# Patient Record
Sex: MALE | Race: WHITE | NOT HISPANIC OR LATINO | Employment: FULL TIME | URBAN - NONMETROPOLITAN AREA
[De-identification: names, ages, dates, MRNs, and addresses within clinical notes are randomized per-mention and may not be internally consistent; named-entity substitution may affect disease eponyms.]

---

## 2017-02-08 ENCOUNTER — TRANSFERRED RECORDS (OUTPATIENT)
Dept: HEALTH INFORMATION MANAGEMENT | Facility: HOSPITAL | Age: 52
End: 2017-02-08

## 2017-02-08 DIAGNOSIS — Z72.0 TOBACCO ABUSE: ICD-10-CM

## 2017-02-08 DIAGNOSIS — I10 ESSENTIAL HYPERTENSION: Primary | ICD-10-CM

## 2017-02-23 ENCOUNTER — HOSPITAL ENCOUNTER (OUTPATIENT)
Dept: RESPIRATORY THERAPY | Facility: HOSPITAL | Age: 52
Discharge: HOME | End: 2017-02-23
Attending: PHYSICIAN ASSISTANT | Admitting: FAMILY MEDICINE
Payer: COMMERCIAL

## 2017-02-23 LAB
BASE DEFICIT BLDA-SCNC: 1 MMOL/L
COHGB MFR BLD: 3.5 % (ref 0–2)
HCO3 BLD-SCNC: 23 MMOL/L (ref 21–28)
HGB BLD-MCNC: 15.4 G/DL (ref 13.3–17.7)
O2/TOTAL GAS SETTING VFR VENT: ABNORMAL %
OXYHGB MFR BLD: 92 % (ref 92–100)
PCO2 BLD: 38 MM HG (ref 35–45)
PH BLD: 7.4 PH (ref 7.35–7.45)
PO2 BLD: 66 MM HG (ref 80–105)

## 2017-02-23 PROCEDURE — 94726 PLETHYSMOGRAPHY LUNG VOLUMES: CPT | Mod: 26 | Performed by: INTERNAL MEDICINE

## 2017-02-23 PROCEDURE — 82805 BLOOD GASES W/O2 SATURATION: CPT | Performed by: FAMILY MEDICINE

## 2017-02-23 PROCEDURE — 94729 DIFFUSING CAPACITY: CPT | Mod: 26 | Performed by: INTERNAL MEDICINE

## 2017-02-23 PROCEDURE — 94729 DIFFUSING CAPACITY: CPT

## 2017-02-23 PROCEDURE — 25000308 HC RX OP HPI UCR WEL MED 250 IP 250: Performed by: FAMILY MEDICINE

## 2017-02-23 PROCEDURE — 94726 PLETHYSMOGRAPHY LUNG VOLUMES: CPT

## 2017-02-23 PROCEDURE — 36600 WITHDRAWAL OF ARTERIAL BLOOD: CPT

## 2017-02-23 PROCEDURE — 94010 BREATHING CAPACITY TEST: CPT | Mod: 26 | Performed by: INTERNAL MEDICINE

## 2017-02-23 PROCEDURE — 82375 ASSAY CARBOXYHB QUANT: CPT | Performed by: FAMILY MEDICINE

## 2017-02-23 PROCEDURE — 94010 BREATHING CAPACITY TEST: CPT

## 2017-02-23 PROCEDURE — 85018 HEMOGLOBIN: CPT | Performed by: FAMILY MEDICINE

## 2017-02-23 RX ORDER — ALBUTEROL SULFATE 0.83 MG/ML
2.5 SOLUTION RESPIRATORY (INHALATION)
Status: COMPLETED | OUTPATIENT
Start: 2017-02-23 | End: 2017-02-23

## 2017-02-23 RX ORDER — ALLOPURINOL 300 MG/1
300 TABLET ORAL DAILY
COMMUNITY
End: 2023-07-19

## 2017-02-23 RX ADMIN — ALBUTEROL SULFATE 2.5 MG: 2.5 SOLUTION RESPIRATORY (INHALATION) at 10:28

## 2020-02-14 ENCOUNTER — APPOINTMENT (OUTPATIENT)
Dept: GENERAL RADIOLOGY | Facility: HOSPITAL | Age: 55
End: 2020-02-14
Attending: EMERGENCY MEDICINE
Payer: COMMERCIAL

## 2020-02-14 ENCOUNTER — HOSPITAL ENCOUNTER (EMERGENCY)
Facility: HOSPITAL | Age: 55
Discharge: HOME | End: 2020-02-14
Attending: EMERGENCY MEDICINE | Admitting: EMERGENCY MEDICINE
Payer: COMMERCIAL

## 2020-02-14 ENCOUNTER — HOSPITAL ENCOUNTER (INPATIENT)
Facility: HOSPITAL | Age: 55
LOS: 12 days | Discharge: NURSING, REHAB OR LONG-TERM CARE | DRG: 207 | End: 2020-02-27
Attending: EMERGENCY MEDICINE | Admitting: INTERNAL MEDICINE
Payer: COMMERCIAL

## 2020-02-14 ENCOUNTER — APPOINTMENT (OUTPATIENT)
Dept: GENERAL RADIOLOGY | Facility: HOSPITAL | Age: 55
DRG: 207 | End: 2020-02-14
Attending: EMERGENCY MEDICINE
Payer: COMMERCIAL

## 2020-02-14 ENCOUNTER — APPOINTMENT (OUTPATIENT)
Dept: CT IMAGING | Facility: HOSPITAL | Age: 55
DRG: 207 | End: 2020-02-14
Attending: EMERGENCY MEDICINE
Payer: COMMERCIAL

## 2020-02-14 VITALS
HEART RATE: 65 BPM | TEMPERATURE: 98.1 F | SYSTOLIC BLOOD PRESSURE: 156 MMHG | OXYGEN SATURATION: 94 % | RESPIRATION RATE: 18 BRPM | DIASTOLIC BLOOD PRESSURE: 114 MMHG

## 2020-02-14 DIAGNOSIS — J96.01 ACUTE RESPIRATORY FAILURE WITH HYPOXIA (H): Primary | ICD-10-CM

## 2020-02-14 DIAGNOSIS — I26.99 ACUTE PULMONARY EMBOLISM WITHOUT ACUTE COR PULMONALE, UNSPECIFIED PULMONARY EMBOLISM TYPE (H): ICD-10-CM

## 2020-02-14 DIAGNOSIS — J18.9 PNEUMONIA OF LEFT LOWER LOBE DUE TO INFECTIOUS ORGANISM: ICD-10-CM

## 2020-02-14 DIAGNOSIS — I10 BENIGN ESSENTIAL HYPERTENSION: ICD-10-CM

## 2020-02-14 DIAGNOSIS — J10.1 INFLUENZA A: ICD-10-CM

## 2020-02-14 DIAGNOSIS — J44.1 CHRONIC OBSTRUCTIVE PULMONARY DISEASE WITH ACUTE EXACERBATION (H): ICD-10-CM

## 2020-02-14 LAB
ALBUMIN SERPL-MCNC: 4.4 G/DL (ref 3.4–5)
ALP SERPL-CCNC: 94 U/L (ref 40–150)
ALT SERPL W P-5'-P-CCNC: 28 U/L (ref 0–70)
ANION GAP SERPL CALCULATED.3IONS-SCNC: 6 MMOL/L (ref 3–14)
ANION GAP SERPL CALCULATED.3IONS-SCNC: 8 MMOL/L (ref 3–14)
AST SERPL W P-5'-P-CCNC: 22 U/L (ref 0–45)
BASE DEFICIT BLDA-SCNC: 0.6 MMOL/L
BASE DEFICIT BLDA-SCNC: 4.5 MMOL/L
BASOPHILS # BLD AUTO: 0 10E9/L (ref 0–0.2)
BASOPHILS # BLD AUTO: 0.1 10E9/L (ref 0–0.2)
BASOPHILS NFR BLD AUTO: 0.2 %
BASOPHILS NFR BLD AUTO: 0.6 %
BILIRUB SERPL-MCNC: 0.4 MG/DL (ref 0.2–1.3)
BUN SERPL-MCNC: 17 MG/DL (ref 7–30)
BUN SERPL-MCNC: 27 MG/DL (ref 7–30)
CALCIUM SERPL-MCNC: 8.7 MG/DL (ref 8.5–10.1)
CALCIUM SERPL-MCNC: 9.2 MG/DL (ref 8.5–10.1)
CHLORIDE SERPL-SCNC: 104 MMOL/L (ref 94–109)
CHLORIDE SERPL-SCNC: 108 MMOL/L (ref 94–109)
CO2 SERPL-SCNC: 24 MMOL/L (ref 20–32)
CO2 SERPL-SCNC: 25 MMOL/L (ref 20–32)
CREAT SERPL-MCNC: 0.87 MG/DL (ref 0.66–1.25)
CREAT SERPL-MCNC: 0.89 MG/DL (ref 0.66–1.25)
D DIMER PPP FEU-MCNC: 1.3 UG/ML FEU (ref 0–0.5)
DIFFERENTIAL METHOD BLD: ABNORMAL
DIFFERENTIAL METHOD BLD: ABNORMAL
EOSINOPHIL # BLD AUTO: 0 10E9/L (ref 0–0.7)
EOSINOPHIL # BLD AUTO: 0.4 10E9/L (ref 0–0.7)
EOSINOPHIL NFR BLD AUTO: 0 %
EOSINOPHIL NFR BLD AUTO: 2.8 %
ERYTHROCYTE [DISTWIDTH] IN BLOOD BY AUTOMATED COUNT: 13.4 % (ref 10–15)
ERYTHROCYTE [DISTWIDTH] IN BLOOD BY AUTOMATED COUNT: 13.4 % (ref 10–15)
FLUAV+FLUBV RNA SPEC QL NAA+PROBE: NEGATIVE
FLUAV+FLUBV RNA SPEC QL NAA+PROBE: POSITIVE
GFR SERPL CREATININE-BSD FRML MDRD: >90 ML/MIN/{1.73_M2}
GFR SERPL CREATININE-BSD FRML MDRD: >90 ML/MIN/{1.73_M2}
GLUCOSE SERPL-MCNC: 111 MG/DL (ref 70–99)
GLUCOSE SERPL-MCNC: 184 MG/DL (ref 70–99)
HCO3 BLD-SCNC: 22 MMOL/L (ref 21–28)
HCO3 BLD-SCNC: 24 MMOL/L (ref 21–28)
HCT VFR BLD AUTO: 48.2 % (ref 40–53)
HCT VFR BLD AUTO: 48.2 % (ref 40–53)
HGB BLD-MCNC: 16.3 G/DL (ref 13.3–17.7)
HGB BLD-MCNC: 16.3 G/DL (ref 13.3–17.7)
IMM GRANULOCYTES # BLD: 0.1 10E9/L (ref 0–0.4)
IMM GRANULOCYTES # BLD: 0.1 10E9/L (ref 0–0.4)
IMM GRANULOCYTES NFR BLD: 0.5 %
IMM GRANULOCYTES NFR BLD: 0.6 %
LACTATE BLD-SCNC: 1.5 MMOL/L (ref 0.7–2)
LACTATE BLD-SCNC: 1.5 MMOL/L (ref 0.7–2)
LYMPHOCYTES # BLD AUTO: 0.4 10E9/L (ref 0.8–5.3)
LYMPHOCYTES # BLD AUTO: 3.2 10E9/L (ref 0.8–5.3)
LYMPHOCYTES NFR BLD AUTO: 2 %
LYMPHOCYTES NFR BLD AUTO: 23.2 %
MCH RBC QN AUTO: 31.4 PG (ref 26.5–33)
MCH RBC QN AUTO: 31.7 PG (ref 26.5–33)
MCHC RBC AUTO-ENTMCNC: 33.8 G/DL (ref 31.5–36.5)
MCHC RBC AUTO-ENTMCNC: 33.8 G/DL (ref 31.5–36.5)
MCV RBC AUTO: 93 FL (ref 78–100)
MCV RBC AUTO: 94 FL (ref 78–100)
MONOCYTES # BLD AUTO: 0.7 10E9/L (ref 0–1.3)
MONOCYTES # BLD AUTO: 0.8 10E9/L (ref 0–1.3)
MONOCYTES NFR BLD AUTO: 3.9 %
MONOCYTES NFR BLD AUTO: 5.6 %
NEUTROPHILS # BLD AUTO: 17.7 10E9/L (ref 1.6–8.3)
NEUTROPHILS # BLD AUTO: 9.4 10E9/L (ref 1.6–8.3)
NEUTROPHILS NFR BLD AUTO: 67.3 %
NEUTROPHILS NFR BLD AUTO: 93.3 %
NRBC # BLD AUTO: 0 10*3/UL
NRBC # BLD AUTO: 0 10*3/UL
NRBC BLD AUTO-RTO: 0 /100
NRBC BLD AUTO-RTO: 0 /100
NT-PROBNP SERPL-MCNC: 569 PG/ML (ref 0–900)
NT-PROBNP SERPL-MCNC: 59 PG/ML (ref 0–900)
O2/TOTAL GAS SETTING VFR VENT: 21 %
O2/TOTAL GAS SETTING VFR VENT: ABNORMAL %
OXYHGB MFR BLD: 90 % (ref 92–100)
OXYHGB MFR BLD: 91 % (ref 92–100)
PCO2 BLD: 37 MM HG (ref 35–45)
PCO2 BLD: 46 MM HG (ref 35–45)
PH BLD: 7.3 PH (ref 7.35–7.45)
PH BLD: 7.41 PH (ref 7.35–7.45)
PLATELET # BLD AUTO: 233 10E9/L (ref 150–450)
PLATELET # BLD AUTO: 234 10E9/L (ref 150–450)
PO2 BLD: 66 MM HG (ref 80–105)
PO2 BLD: 80 MM HG (ref 80–105)
POTASSIUM SERPL-SCNC: 4.2 MMOL/L (ref 3.4–5.3)
POTASSIUM SERPL-SCNC: 4.7 MMOL/L (ref 3.4–5.3)
PROT SERPL-MCNC: 8.3 G/DL (ref 6.8–8.8)
RBC # BLD AUTO: 5.15 10E12/L (ref 4.4–5.9)
RBC # BLD AUTO: 5.19 10E12/L (ref 4.4–5.9)
RSV RNA SPEC NAA+PROBE: NEGATIVE
SODIUM SERPL-SCNC: 136 MMOL/L (ref 133–144)
SODIUM SERPL-SCNC: 139 MMOL/L (ref 133–144)
SPECIMEN SOURCE: ABNORMAL
TROPONIN I SERPL-MCNC: <0.015 UG/L (ref 0–0.04)
TROPONIN I SERPL-MCNC: <0.015 UG/L (ref 0–0.04)
WBC # BLD AUTO: 14 10E9/L (ref 4–11)
WBC # BLD AUTO: 18.9 10E9/L (ref 4–11)

## 2020-02-14 PROCEDURE — 85025 COMPLETE CBC W/AUTO DIFF WBC: CPT | Performed by: EMERGENCY MEDICINE

## 2020-02-14 PROCEDURE — 93005 ELECTROCARDIOGRAM TRACING: CPT

## 2020-02-14 PROCEDURE — 71275 CT ANGIOGRAPHY CHEST: CPT | Mod: TC

## 2020-02-14 PROCEDURE — 99291 CRITICAL CARE FIRST HOUR: CPT | Mod: 25

## 2020-02-14 PROCEDURE — 25000125 ZZHC RX 250: Performed by: EMERGENCY MEDICINE

## 2020-02-14 PROCEDURE — 99292 CRITICAL CARE ADDL 30 MIN: CPT

## 2020-02-14 PROCEDURE — 83605 ASSAY OF LACTIC ACID: CPT | Performed by: FAMILY MEDICINE

## 2020-02-14 PROCEDURE — 25000128 H RX IP 250 OP 636: Performed by: EMERGENCY MEDICINE

## 2020-02-14 PROCEDURE — 36415 COLL VENOUS BLD VENIPUNCTURE: CPT | Performed by: EMERGENCY MEDICINE

## 2020-02-14 PROCEDURE — 96375 TX/PRO/DX INJ NEW DRUG ADDON: CPT

## 2020-02-14 PROCEDURE — 83605 ASSAY OF LACTIC ACID: CPT | Performed by: EMERGENCY MEDICINE

## 2020-02-14 PROCEDURE — 71045 X-RAY EXAM CHEST 1 VIEW: CPT | Mod: TC

## 2020-02-14 PROCEDURE — 40000275 ZZH STATISTIC RCP TIME EA 10 MIN

## 2020-02-14 PROCEDURE — 94640 AIRWAY INHALATION TREATMENT: CPT

## 2020-02-14 PROCEDURE — 87040 BLOOD CULTURE FOR BACTERIA: CPT | Performed by: EMERGENCY MEDICINE

## 2020-02-14 PROCEDURE — 36415 COLL VENOUS BLD VENIPUNCTURE: CPT | Performed by: FAMILY MEDICINE

## 2020-02-14 PROCEDURE — 99285 EMERGENCY DEPT VISIT HI MDM: CPT | Mod: 25

## 2020-02-14 PROCEDURE — 84484 ASSAY OF TROPONIN QUANT: CPT | Performed by: EMERGENCY MEDICINE

## 2020-02-14 PROCEDURE — 80053 COMPREHEN METABOLIC PANEL: CPT | Performed by: EMERGENCY MEDICINE

## 2020-02-14 PROCEDURE — 31500 INSERT EMERGENCY AIRWAY: CPT

## 2020-02-14 PROCEDURE — 96376 TX/PRO/DX INJ SAME DRUG ADON: CPT

## 2020-02-14 PROCEDURE — 85025 COMPLETE CBC W/AUTO DIFF WBC: CPT | Performed by: FAMILY MEDICINE

## 2020-02-14 PROCEDURE — 94002 VENT MGMT INPAT INIT DAY: CPT

## 2020-02-14 PROCEDURE — 96365 THER/PROPH/DIAG IV INF INIT: CPT

## 2020-02-14 PROCEDURE — 82805 BLOOD GASES W/O2 SATURATION: CPT | Performed by: EMERGENCY MEDICINE

## 2020-02-14 PROCEDURE — 25000132 ZZH RX MED GY IP 250 OP 250 PS 637: Performed by: EMERGENCY MEDICINE

## 2020-02-14 PROCEDURE — 87631 RESP VIRUS 3-5 TARGETS: CPT | Performed by: EMERGENCY MEDICINE

## 2020-02-14 PROCEDURE — 51702 INSERT TEMP BLADDER CATH: CPT

## 2020-02-14 PROCEDURE — 93010 ELECTROCARDIOGRAM REPORT: CPT | Performed by: INTERNAL MEDICINE

## 2020-02-14 PROCEDURE — 94640 AIRWAY INHALATION TREATMENT: CPT | Mod: 76

## 2020-02-14 PROCEDURE — 31500 INSERT EMERGENCY AIRWAY: CPT | Performed by: EMERGENCY MEDICINE

## 2020-02-14 PROCEDURE — 36600 WITHDRAWAL OF ARTERIAL BLOOD: CPT

## 2020-02-14 PROCEDURE — 85379 FIBRIN DEGRADATION QUANT: CPT | Performed by: FAMILY MEDICINE

## 2020-02-14 PROCEDURE — 96374 THER/PROPH/DIAG INJ IV PUSH: CPT

## 2020-02-14 PROCEDURE — 40000986 XR CHEST PORT 1 VW: Mod: TC

## 2020-02-14 PROCEDURE — 80048 BASIC METABOLIC PNL TOTAL CA: CPT | Performed by: FAMILY MEDICINE

## 2020-02-14 PROCEDURE — 83880 ASSAY OF NATRIURETIC PEPTIDE: CPT | Performed by: EMERGENCY MEDICINE

## 2020-02-14 PROCEDURE — 83880 ASSAY OF NATRIURETIC PEPTIDE: CPT | Performed by: FAMILY MEDICINE

## 2020-02-14 PROCEDURE — 99291 CRITICAL CARE FIRST HOUR: CPT | Mod: 25 | Performed by: EMERGENCY MEDICINE

## 2020-02-14 PROCEDURE — 84484 ASSAY OF TROPONIN QUANT: CPT | Performed by: FAMILY MEDICINE

## 2020-02-14 RX ORDER — AZITHROMYCIN 250 MG/1
250 TABLET, FILM COATED ORAL DAILY
Qty: 4 TABLET | Refills: 0 | Status: ON HOLD | OUTPATIENT
Start: 2020-02-14 | End: 2020-02-27

## 2020-02-14 RX ORDER — IPRATROPIUM BROMIDE AND ALBUTEROL SULFATE 2.5; .5 MG/3ML; MG/3ML
3 SOLUTION RESPIRATORY (INHALATION) ONCE
Status: COMPLETED | OUTPATIENT
Start: 2020-02-14 | End: 2020-02-14

## 2020-02-14 RX ORDER — IOPAMIDOL 755 MG/ML
50 INJECTION, SOLUTION INTRAVASCULAR ONCE
Status: COMPLETED | OUTPATIENT
Start: 2020-02-14 | End: 2020-02-14

## 2020-02-14 RX ORDER — LABETALOL 20 MG/4 ML (5 MG/ML) INTRAVENOUS SYRINGE
20 ONCE
Status: COMPLETED | OUTPATIENT
Start: 2020-02-14 | End: 2020-02-14

## 2020-02-14 RX ORDER — DEXAMETHASONE SODIUM PHOSPHATE 10 MG/ML
10 INJECTION INTRAMUSCULAR; INTRAVENOUS ONCE
Status: COMPLETED | OUTPATIENT
Start: 2020-02-14 | End: 2020-02-14

## 2020-02-14 RX ORDER — OSELTAMIVIR PHOSPHATE 75 MG/1
75 CAPSULE ORAL ONCE
Status: COMPLETED | OUTPATIENT
Start: 2020-02-14 | End: 2020-02-14

## 2020-02-14 RX ORDER — METHYLPREDNISOLONE SODIUM SUCCINATE 125 MG/2ML
125 INJECTION, POWDER, LYOPHILIZED, FOR SOLUTION INTRAMUSCULAR; INTRAVENOUS ONCE
Status: COMPLETED | OUTPATIENT
Start: 2020-02-14 | End: 2020-02-14

## 2020-02-14 RX ORDER — IPRATROPIUM BROMIDE AND ALBUTEROL SULFATE 2.5; .5 MG/3ML; MG/3ML
SOLUTION RESPIRATORY (INHALATION)
Status: DISCONTINUED
Start: 2020-02-14 | End: 2020-02-14 | Stop reason: HOSPADM

## 2020-02-14 RX ORDER — OSELTAMIVIR PHOSPHATE 75 MG/1
75 CAPSULE ORAL 2 TIMES DAILY
Qty: 10 CAPSULE | Refills: 0 | Status: ON HOLD | OUTPATIENT
Start: 2020-02-14 | End: 2020-02-27

## 2020-02-14 RX ORDER — LORAZEPAM 2 MG/ML
0.5 INJECTION INTRAMUSCULAR ONCE
Status: COMPLETED | OUTPATIENT
Start: 2020-02-14 | End: 2020-02-14

## 2020-02-14 RX ORDER — AZITHROMYCIN 250 MG/1
500 TABLET, FILM COATED ORAL ONCE
Status: COMPLETED | OUTPATIENT
Start: 2020-02-14 | End: 2020-02-14

## 2020-02-14 RX ORDER — LABETALOL 20 MG/4 ML (5 MG/ML) INTRAVENOUS SYRINGE
10 ONCE
Status: COMPLETED | OUTPATIENT
Start: 2020-02-14 | End: 2020-02-14

## 2020-02-14 RX ORDER — PREDNISONE 20 MG/1
TABLET ORAL
Qty: 10 TABLET | Refills: 0 | Status: ON HOLD | OUTPATIENT
Start: 2020-02-14 | End: 2020-02-27

## 2020-02-14 RX ORDER — CEFTRIAXONE SODIUM 1 G/50ML
1 INJECTION, SOLUTION INTRAVENOUS ONCE
Status: COMPLETED | OUTPATIENT
Start: 2020-02-14 | End: 2020-02-14

## 2020-02-14 RX ORDER — SIMVASTATIN 40 MG
40 TABLET ORAL AT BEDTIME
Qty: 30 TABLET | Refills: 0 | Status: ON HOLD | OUTPATIENT
Start: 2020-02-14 | End: 2020-02-17

## 2020-02-14 RX ORDER — AMLODIPINE BESYLATE 10 MG/1
10 TABLET ORAL DAILY
Qty: 30 TABLET | Refills: 0 | Status: SHIPPED | OUTPATIENT
Start: 2020-02-14 | End: 2023-07-19

## 2020-02-14 RX ORDER — MIDAZOLAM HYDROCHLORIDE 5 MG/ML
5 INJECTION, SOLUTION INTRAMUSCULAR; INTRAVENOUS ONCE
Status: COMPLETED | OUTPATIENT
Start: 2020-02-14 | End: 2020-02-15

## 2020-02-14 RX ORDER — ASPIRIN 81 MG/1
324 TABLET, CHEWABLE ORAL ONCE
Status: COMPLETED | OUTPATIENT
Start: 2020-02-14 | End: 2020-02-14

## 2020-02-14 RX ORDER — ALBUTEROL SULFATE 90 UG/1
1-2 AEROSOL, METERED RESPIRATORY (INHALATION) EVERY 4 HOURS PRN
Qty: 1 INHALER | Refills: 0 | Status: SHIPPED | OUTPATIENT
Start: 2020-02-14

## 2020-02-14 RX ADMIN — LABETALOL 20 MG/4 ML (5 MG/ML) INTRAVENOUS SYRINGE 20 MG: at 21:22

## 2020-02-14 RX ADMIN — IPRATROPIUM BROMIDE AND ALBUTEROL SULFATE 3 ML: .5; 3 SOLUTION RESPIRATORY (INHALATION) at 05:15

## 2020-02-14 RX ADMIN — IOPAMIDOL 50 ML: 755 INJECTION, SOLUTION INTRAVENOUS at 23:24

## 2020-02-14 RX ADMIN — DEXAMETHASONE SODIUM PHOSPHATE 10 MG: 10 INJECTION INTRAMUSCULAR; INTRAVENOUS at 21:22

## 2020-02-14 RX ADMIN — IPRATROPIUM BROMIDE AND ALBUTEROL SULFATE 3 ML: .5; 3 SOLUTION RESPIRATORY (INHALATION) at 05:53

## 2020-02-14 RX ADMIN — ASPIRIN 81 MG 324 MG: 81 TABLET ORAL at 05:31

## 2020-02-14 RX ADMIN — AZITHROMYCIN 500 MG: 250 TABLET, FILM COATED ORAL at 07:22

## 2020-02-14 RX ADMIN — IPRATROPIUM BROMIDE AND ALBUTEROL SULFATE 3 ML: .5; 3 SOLUTION RESPIRATORY (INHALATION) at 20:53

## 2020-02-14 RX ADMIN — OSELTAMIVIR PHOSPHATE 75 MG: 75 CAPSULE ORAL at 21:40

## 2020-02-14 RX ADMIN — LABETALOL 20 MG/4 ML (5 MG/ML) INTRAVENOUS SYRINGE 20 MG: at 05:18

## 2020-02-14 RX ADMIN — CEFTRIAXONE SODIUM 1 G: 1 INJECTION, SOLUTION INTRAVENOUS at 07:19

## 2020-02-14 RX ADMIN — IPRATROPIUM BROMIDE AND ALBUTEROL SULFATE 3 ML: .5; 3 SOLUTION RESPIRATORY (INHALATION) at 21:19

## 2020-02-14 RX ADMIN — OSELTAMIVIR PHOSPHATE 75 MG: 75 CAPSULE ORAL at 07:23

## 2020-02-14 RX ADMIN — LABETALOL 20 MG/4 ML (5 MG/ML) INTRAVENOUS SYRINGE 20 MG: at 22:35

## 2020-02-14 RX ADMIN — LORAZEPAM 0.5 MG: 2 INJECTION, SOLUTION INTRAMUSCULAR; INTRAVENOUS at 22:10

## 2020-02-14 RX ADMIN — LABETALOL 20 MG/4 ML (5 MG/ML) INTRAVENOUS SYRINGE 10 MG: at 06:17

## 2020-02-14 RX ADMIN — METHYLPREDNISOLONE SODIUM SUCCINATE 125 MG: 125 INJECTION, POWDER, FOR SOLUTION INTRAMUSCULAR; INTRAVENOUS at 05:19

## 2020-02-14 ASSESSMENT — ENCOUNTER SYMPTOMS
WHEEZING: 1
COUGH: 1
COUGH: 1
SHORTNESS OF BREATH: 1
SHORTNESS OF BREATH: 1

## 2020-02-14 NOTE — ED AVS SNAPSHOT
[facility]  [address] [address]  Phone:  [phone]                                    Ry Man   MRN: [id number]    Department:  [facility]   Date of Visit:  2/14/2020           After Visit Summary Signature Page    I have received my discharge instructions, and my questions have been answered. I have discussed any challenges I see with this plan with the nurse or doctor.    ..........................................................................................................................................  Patient/Patient Representative Signature      ..........................................................................................................................................  Patient Representative Print Name and Relationship to Patient    ..................................................               ................................................  Date                                   Time    ..........................................................................................................................................  Reviewed by Signature/Title    ...................................................              ..............................................  Date                                               Time          22EPIC Rev 08/18

## 2020-02-14 NOTE — ED NOTES
"Pt here stating SOB beginning at 2300. RR 35, purse lip breathing, exp wheezing throughout. States he used his \"puffer\" at home but thinks it might be out because he cant afford a new one. Pt is experiencing very poor activity tolerance but is able to recover and feels he can breathe easy after 5 minutes of rest. In this case pt swung legs into bed and became tachypnic, SOB, and audible wheezes heard. States he has been feeling like this for 2 days but this is the worst it has been.  Pt BP 200s/100s. States he hasnt been on any of his medications since August 2019 d/t finances.   "

## 2020-02-14 NOTE — ED PROVIDER NOTES
"  History     Chief Complaint   Patient presents with     Shortness of Breath     c/o SOB that got worse about 11 PM. Reports history of SOB for 4 years due to COPD. Also c/o left side chest pain that gets worse with coughing. Rates 2 or 3/10     HPI  Ry Man is a 54 year old male who presents to the ED with CC of shortness of breath.  He has a hx of COPD and HTN but quit his meds approx 6 months ago due to financial reasons.  Pt states that his SOB got worse over the last 6 hours.  He also has a cough with some L-sided CP that is worse with coughing.  Of note, pt's wife was reportedly just diagnosed with Influenza today.  Pt is a smoker.  He usually gets a yearly flu shot but didn't this year.  On further questioning, pt admits that he has had the intermittent L-sided CP for the last 6 months.  Denies ST and F/C.    Allergies:  Allergies   Allergen Reactions     Ethyl Alcohol, Skin      \"Ethyl Alcohol\"     Lisinopril Other (See Comments)     Cough       Valsartan Rash and GI Disturbance     Diovan       Problem List:    There are no active problems to display for this patient.       Past Medical History:    Past Medical History:   Diagnosis Date     Abnormal liver function test 1/1/2011     Bronchitis, not specified as acute or chronic 2/16/2005     Osteoarthritis 1/1/2011     Tobacco abuse 1/1/2011     Unspecified essential hypertension 9/1/2006       Past Surgical History:    Past Surgical History:   Procedure Laterality Date     cysts removed from neck      Bilateral     vasectomy       wisdom teeth extraction         Family History:    Family History   Problem Relation Age of Onset     C.A.D. Mother      C.A.D. Brother 51        Cause of death     Heart Failure Father 72        CHF, cause of death     Diabetes Brother      Diabetes Mother      Diabetes Father      Hypertension Brother      Other - See Comments Brother         Multiple Sclerosis       Social History:  Marital Status:   [2]  Social " History     Tobacco Use     Smoking status: Current Every Day Smoker     Packs/day: 1.00     Years: 37.00     Pack years: 37.00     Types: Cigarettes     Smokeless tobacco: Never Used     Tobacco comment: Tried to quit; longest tobacco free, 8 years   Substance Use Topics     Alcohol use: No     Drug use: No        Medications:    albuterol (PROAIR HFA/PROVENTIL HFA/VENTOLIN HFA) 108 (90 Base) MCG/ACT inhaler  amLODIPine (NORVASC) 10 MG tablet  azithromycin (ZITHROMAX) 250 MG tablet  Multiple Vitamin (MULTI VITAMIN MENS PO)  oseltamivir (TAMIFLU) 75 MG capsule  predniSONE (DELTASONE) 20 MG tablet  simvastatin (ZOCOR) 40 MG tablet  Vitamins C E (VITAMIN C & E COMBINATION PO)  ALLOPURINOL PO  amLODIPine (NORVASC) 10 MG tablet  beta carotene 40999 UNIT capsule  Cholecalciferol (VITAMIN D PO)  Nutritional Supplements (DHEA PO)  simvastatin (ZOCOR) 40 MG tablet          Review of Systems   Respiratory: Positive for cough and shortness of breath.    Cardiovascular: Positive for chest pain.   All other systems reviewed and are negative.      Physical Exam   BP: (!) 221/147  Pulse: 102  Heart Rate: 63  Temp: 98.3  F (36.8  C)  Resp: 24  SpO2: 93 %      Physical Exam  Vitals signs and nursing note reviewed.   Constitutional:       Appearance: He is well-developed and normal weight.   HENT:      Head: Normocephalic.      Mouth/Throat:      Mouth: Mucous membranes are moist.      Pharynx: Oropharynx is clear.   Eyes:      Extraocular Movements: Extraocular movements intact.      Pupils: Pupils are equal, round, and reactive to light.   Neck:      Musculoskeletal: Normal range of motion and neck supple.   Cardiovascular:      Rate and Rhythm: Regular rhythm. Tachycardia present.      Pulses: Normal pulses.      Heart sounds: Normal heart sounds. No murmur. No friction rub. No gallop.    Pulmonary:      Effort: Tachypnea and respiratory distress (mild) present.      Breath sounds: Examination of the right-middle field reveals  wheezing. Examination of the left-middle field reveals wheezing. Examination of the right-lower field reveals decreased breath sounds. Examination of the left-lower field reveals decreased breath sounds. Decreased breath sounds and wheezing present.   Chest:      Chest wall: Tenderness (L lateral chest) present.   Abdominal:      General: Bowel sounds are normal.      Palpations: Abdomen is soft.      Tenderness: There is no abdominal tenderness.   Musculoskeletal: Normal range of motion.      Right lower leg: No edema.      Left lower leg: No edema.   Skin:     General: Skin is warm and dry.      Capillary Refill: Capillary refill takes 2 to 3 seconds.   Neurological:      General: No focal deficit present.      Mental Status: He is alert.   Psychiatric:         Mood and Affect: Mood normal.         Behavior: Behavior normal.            12-lead EKG - NSR at 92bpm, occ PVCs, no acute ST-T changes  CXR - changes consistent with COPD + LLL infiltrate    ED Course   Duoneb x 2, Solumedrol 125mg IV, ASA 324mg PO, Labetalol 20mg IV f/b 10mg IV  Rocephin 1g IV, Zithromax 500mg PO, Tamiflu 75mg PO                    Critical Care time:  none               Results for orders placed or performed during the hospital encounter of 02/14/20 (from the past 24 hour(s))   CBC with platelets differential   Result Value Ref Range    WBC 14.0 (H) 4.0 - 11.0 10e9/L    RBC Count 5.19 4.4 - 5.9 10e12/L    Hemoglobin 16.3 13.3 - 17.7 g/dL    Hematocrit 48.2 40.0 - 53.0 %    MCV 93 78 - 100 fl    MCH 31.4 26.5 - 33.0 pg    MCHC 33.8 31.5 - 36.5 g/dL    RDW 13.4 10.0 - 15.0 %    Platelet Count 234 150 - 450 10e9/L    Diff Method Automated Method     % Neutrophils 67.3 %    % Lymphocytes 23.2 %    % Monocytes 5.6 %    % Eosinophils 2.8 %    % Basophils 0.6 %    % Immature Granulocytes 0.5 %    Nucleated RBCs 0 0 /100    Absolute Neutrophil 9.4 (H) 1.6 - 8.3 10e9/L    Absolute Lymphocytes 3.2 0.8 - 5.3 10e9/L    Absolute Monocytes 0.8 0.0 -  1.3 10e9/L    Absolute Eosinophils 0.4 0.0 - 0.7 10e9/L    Absolute Basophils 0.1 0.0 - 0.2 10e9/L    Abs Immature Granulocytes 0.1 0 - 0.4 10e9/L    Absolute Nucleated RBC 0.0    Comprehensive metabolic panel   Result Value Ref Range    Sodium 139 133 - 144 mmol/L    Potassium 4.2 3.4 - 5.3 mmol/L    Chloride 108 94 - 109 mmol/L    Carbon Dioxide 25 20 - 32 mmol/L    Anion Gap 6 3 - 14 mmol/L    Glucose 111 (H) 70 - 99 mg/dL    Urea Nitrogen 17 7 - 30 mg/dL    Creatinine 0.89 0.66 - 1.25 mg/dL    GFR Estimate >90 >60 mL/min/[1.73_m2]    GFR Estimate If Black >90 >60 mL/min/[1.73_m2]    Calcium 8.7 8.5 - 10.1 mg/dL    Bilirubin Total 0.4 0.2 - 1.3 mg/dL    Albumin 4.4 3.4 - 5.0 g/dL    Protein Total 8.3 6.8 - 8.8 g/dL    Alkaline Phosphatase 94 40 - 150 U/L    ALT 28 0 - 70 U/L    AST 22 0 - 45 U/L   Troponin I   Result Value Ref Range    Troponin I ES <0.015 0.000 - 0.045 ug/L   NT pro BNP   Result Value Ref Range    N-Terminal Pro BNP Inpatient 59 0 - 900 pg/mL   Lactic acid whole blood   Result Value Ref Range    Lactic Acid 1.5 0.7 - 2.0 mmol/L   Blood gas arterial and oxyhgb   Result Value Ref Range    pH Arterial 7.41 7.35 - 7.45 pH    pCO2 Arterial 37 35 - 45 mm Hg    pO2 Arterial 66 (L) 80 - 105 mm Hg    Bicarbonate Arterial 24 21 - 28 mmol/L    FIO2 21     Oxyhemoglobin Arterial 90 (L) 92 - 100 %    Base Deficit Art 0.6 mmol/L   Influenza A and B and RSV PCR   Result Value Ref Range    Specimen Description Nasopharyngeal     Influenza A PCR Positive (A) NEG^Negative    Influenza B PCR Negative NEG^Negative    Resp Syncytial Virus Negative NEG^Negative       Medications   oseltamivir (TAMIFLU) capsule 75 mg (has no administration in time range)   azithromycin (ZITHROMAX) tablet 500 mg (has no administration in time range)   cefTRIAXone in d5w (ROCEPHIN) intermittent infusion 1 g (has no administration in time range)   ipratropium - albuterol 0.5 mg/2.5 mg/3 mL (DUONEB) neb solution 3 mL (3 mLs Nebulization  Given 2/14/20 0515)   labetalol (NORMODYNE/TRANDATE) syringe 20 mg (20 mg Intravenous Given 2/14/20 0518)   methylPREDNISolone sodium succinate (solu-MEDROL) injection 125 mg (125 mg Intravenous Given 2/14/20 0519)   aspirin (ASA) chewable tablet 324 mg (324 mg Oral Given 2/14/20 0531)   ipratropium - albuterol 0.5 mg/2.5 mg/3 mL (DUONEB) neb solution 3 mL (3 mLs Nebulization Given 2/14/20 0553)   labetalol (NORMODYNE/TRANDATE) syringe 10 mg (10 mg Intravenous Given 2/14/20 0617)       Assessments & Plan (with Medical Decision Making)     I have reviewed the nursing notes.    I have reviewed the findings, diagnosis, plan and need for follow up with the patient.  See Discharge Instructions.  I did refill pt's previous meds, and he was encouraged to get a local PCP to f/u within the next 2-3 weeks.       New Prescriptions    ALBUTEROL (PROAIR HFA/PROVENTIL HFA/VENTOLIN HFA) 108 (90 BASE) MCG/ACT INHALER    Inhale 1-2 puffs into the lungs every 4 hours as needed for shortness of breath / dyspnea or wheezing    AMLODIPINE (NORVASC) 10 MG TABLET    Take 1 tablet (10 mg) by mouth daily    AZITHROMYCIN (ZITHROMAX) 250 MG TABLET    Take 1 tablet (250 mg) by mouth daily    OSELTAMIVIR (TAMIFLU) 75 MG CAPSULE    Take 1 capsule (75 mg) by mouth 2 times daily for 5 days    PREDNISONE (DELTASONE) 20 MG TABLET    Take two tablets (= 40mg) each day for 5 (five) days    SIMVASTATIN (ZOCOR) 40 MG TABLET    Take 1 tablet (40 mg) by mouth At Bedtime       Final diagnoses:   Influenza A   Pneumonia of left lower lobe due to infectious organism (H)       2/14/2020   [facility]     Courtney Rivera,   02/14/20 0707       Courtney Rivera,   02/14/20 0708

## 2020-02-14 NOTE — DISCHARGE INSTRUCTIONS
Zithromax 1 pill daily for 4 more days, but it stays in your body x 10 days - start tomorrow.  Tamiflu 1 pill twice a day for 5 days - start this evening.  Prednisone 40mg daily x 5 days -  start today.  Resume your Norvasc and Zocor as prescribed.   Use the ProAir inhaler as needed for shortness of breath or wheezing - 2 puffs every 4 hours.  Rest.  Wash hands frequently, cover the mouth with coughing, and avoid sharing towels or eating/drinking utensils with others.  Followup with a local clinic doctor within the next 2-3 weeks.

## 2020-02-14 NOTE — ED NOTES
Condition stable, all prescriptions e-scribed to Pharmacy of choice, understands discharge instructions, discharged home.

## 2020-02-15 ENCOUNTER — APPOINTMENT (OUTPATIENT)
Dept: GENERAL RADIOLOGY | Facility: HOSPITAL | Age: 55
DRG: 207 | End: 2020-02-15
Attending: INTERNAL MEDICINE
Payer: COMMERCIAL

## 2020-02-15 PROBLEM — J96.90 RESPIRATORY FAILURE (H): Status: ACTIVE | Noted: 2020-02-15

## 2020-02-15 PROBLEM — J96.01 ACUTE RESPIRATORY FAILURE WITH HYPOXIA (H): Status: ACTIVE | Noted: 2020-02-15

## 2020-02-15 PROBLEM — J10.1 INFLUENZA A: Status: ACTIVE | Noted: 2020-02-15

## 2020-02-15 PROBLEM — I10 BENIGN ESSENTIAL HYPERTENSION: Status: ACTIVE | Noted: 2020-02-15

## 2020-02-15 LAB
ALBUMIN SERPL-MCNC: 3.6 G/DL (ref 3.4–5)
ALP SERPL-CCNC: 77 U/L (ref 40–150)
ALT SERPL W P-5'-P-CCNC: 34 U/L (ref 0–70)
ANION GAP SERPL CALCULATED.3IONS-SCNC: 6 MMOL/L (ref 3–14)
ANION GAP SERPL CALCULATED.3IONS-SCNC: 6 MMOL/L (ref 3–14)
AST SERPL W P-5'-P-CCNC: 25 U/L (ref 0–45)
BASE DEFICIT BLDA-SCNC: 3.7 MMOL/L
BASE DEFICIT BLDA-SCNC: 4.6 MMOL/L
BASE DEFICIT BLDA-SCNC: 5.3 MMOL/L
BASE DEFICIT BLDA-SCNC: 6.2 MMOL/L
BASE DEFICIT BLDA-SCNC: 7.1 MMOL/L
BASOPHILS # BLD AUTO: 0 10E9/L (ref 0–0.2)
BASOPHILS NFR BLD AUTO: 0.1 %
BILIRUB SERPL-MCNC: 0.3 MG/DL (ref 0.2–1.3)
BUN SERPL-MCNC: 32 MG/DL (ref 7–30)
BUN SERPL-MCNC: 36 MG/DL (ref 7–30)
CALCIUM SERPL-MCNC: 7.9 MG/DL (ref 8.5–10.1)
CALCIUM SERPL-MCNC: 8 MG/DL (ref 8.5–10.1)
CHLORIDE SERPL-SCNC: 110 MMOL/L (ref 94–109)
CHLORIDE SERPL-SCNC: 110 MMOL/L (ref 94–109)
CO2 SERPL-SCNC: 22 MMOL/L (ref 20–32)
CO2 SERPL-SCNC: 22 MMOL/L (ref 20–32)
CREAT SERPL-MCNC: 1.05 MG/DL (ref 0.66–1.25)
CREAT SERPL-MCNC: 1.17 MG/DL (ref 0.66–1.25)
DIFFERENTIAL METHOD BLD: ABNORMAL
EOSINOPHIL # BLD AUTO: 0 10E9/L (ref 0–0.7)
EOSINOPHIL NFR BLD AUTO: 0 %
ERYTHROCYTE [DISTWIDTH] IN BLOOD BY AUTOMATED COUNT: 13.9 % (ref 10–15)
GFR SERPL CREATININE-BSD FRML MDRD: 69 ML/MIN/{1.73_M2}
GFR SERPL CREATININE-BSD FRML MDRD: 79 ML/MIN/{1.73_M2}
GLUCOSE BLDC GLUCOMTR-MCNC: 144 MG/DL (ref 70–99)
GLUCOSE BLDC GLUCOMTR-MCNC: 160 MG/DL (ref 70–99)
GLUCOSE SERPL-MCNC: 149 MG/DL (ref 70–99)
GLUCOSE SERPL-MCNC: 155 MG/DL (ref 70–99)
GRAM STN SPEC: ABNORMAL
HCO3 BLD-SCNC: 22 MMOL/L (ref 21–28)
HCO3 BLD-SCNC: 23 MMOL/L (ref 21–28)
HCT VFR BLD AUTO: 44.1 % (ref 40–53)
HGB BLD-MCNC: 14.2 G/DL (ref 13.3–17.7)
IMM GRANULOCYTES # BLD: 0.1 10E9/L (ref 0–0.4)
IMM GRANULOCYTES NFR BLD: 0.6 %
LACTATE BLD-SCNC: 1.4 MMOL/L (ref 0.7–2)
LYMPHOCYTES # BLD AUTO: 0.4 10E9/L (ref 0.8–5.3)
LYMPHOCYTES NFR BLD AUTO: 2.7 %
MAGNESIUM SERPL-MCNC: 2.5 MG/DL (ref 1.6–2.3)
MCH RBC QN AUTO: 31.6 PG (ref 26.5–33)
MCHC RBC AUTO-ENTMCNC: 32.2 G/DL (ref 31.5–36.5)
MCV RBC AUTO: 98 FL (ref 78–100)
MONOCYTES # BLD AUTO: 0.7 10E9/L (ref 0–1.3)
MONOCYTES NFR BLD AUTO: 4.4 %
NEUTROPHILS # BLD AUTO: 14.2 10E9/L (ref 1.6–8.3)
NEUTROPHILS NFR BLD AUTO: 92.2 %
NRBC # BLD AUTO: 0 10*3/UL
NRBC BLD AUTO-RTO: 0 /100
O2/TOTAL GAS SETTING VFR VENT: 45 %
O2/TOTAL GAS SETTING VFR VENT: 45 %
O2/TOTAL GAS SETTING VFR VENT: 55 %
O2/TOTAL GAS SETTING VFR VENT: ABNORMAL %
O2/TOTAL GAS SETTING VFR VENT: ABNORMAL %
OXYHGB MFR BLD: 91 % (ref 92–100)
OXYHGB MFR BLD: 94 % (ref 92–100)
OXYHGB MFR BLD: 94 % (ref 92–100)
OXYHGB MFR BLD: 95 % (ref 92–100)
OXYHGB MFR BLD: 95 % (ref 92–100)
PCO2 BLD: 46 MM HG (ref 35–45)
PCO2 BLD: 47 MM HG (ref 35–45)
PCO2 BLD: 52 MM HG (ref 35–45)
PCO2 BLD: 61 MM HG (ref 35–45)
PCO2 BLD: 64 MM HG (ref 35–45)
PH BLD: 7.16 PH (ref 7.35–7.45)
PH BLD: 7.19 PH (ref 7.35–7.45)
PH BLD: 7.25 PH (ref 7.35–7.45)
PH BLD: 7.29 PH (ref 7.35–7.45)
PH BLD: 7.3 PH (ref 7.35–7.45)
PLATELET # BLD AUTO: 183 10E9/L (ref 150–450)
PO2 BLD: 66 MM HG (ref 80–105)
PO2 BLD: 78 MM HG (ref 80–105)
PO2 BLD: 89 MM HG (ref 80–105)
PO2 BLD: 94 MM HG (ref 80–105)
PO2 BLD: 95 MM HG (ref 80–105)
POTASSIUM SERPL-SCNC: 5.2 MMOL/L (ref 3.4–5.3)
POTASSIUM SERPL-SCNC: 6.2 MMOL/L (ref 3.4–5.3)
PROCALCITONIN SERPL-MCNC: 0.39 NG/ML
PROT SERPL-MCNC: 6.8 G/DL (ref 6.8–8.8)
RBC # BLD AUTO: 4.5 10E12/L (ref 4.4–5.9)
SODIUM SERPL-SCNC: 138 MMOL/L (ref 133–144)
SODIUM SERPL-SCNC: 138 MMOL/L (ref 133–144)
SPECIMEN SOURCE: ABNORMAL
WBC # BLD AUTO: 15.4 10E9/L (ref 4–11)

## 2020-02-15 PROCEDURE — 94640 AIRWAY INHALATION TREATMENT: CPT | Mod: 76

## 2020-02-15 PROCEDURE — 5A1955Z RESPIRATORY VENTILATION, GREATER THAN 96 CONSECUTIVE HOURS: ICD-10-PCS | Performed by: INTERNAL MEDICINE

## 2020-02-15 PROCEDURE — 40000986 XR CHEST PORT 1 VW

## 2020-02-15 PROCEDURE — 20000003 ZZH R&B ICU

## 2020-02-15 PROCEDURE — 80053 COMPREHEN METABOLIC PANEL: CPT | Performed by: INTERNAL MEDICINE

## 2020-02-15 PROCEDURE — 36600 WITHDRAWAL OF ARTERIAL BLOOD: CPT

## 2020-02-15 PROCEDURE — 83735 ASSAY OF MAGNESIUM: CPT | Performed by: INTERNAL MEDICINE

## 2020-02-15 PROCEDURE — 25000125 ZZHC RX 250: Performed by: INTERNAL MEDICINE

## 2020-02-15 PROCEDURE — 25000128 H RX IP 250 OP 636

## 2020-02-15 PROCEDURE — 80048 BASIC METABOLIC PNL TOTAL CA: CPT | Performed by: INTERNAL MEDICINE

## 2020-02-15 PROCEDURE — 82805 BLOOD GASES W/O2 SATURATION: CPT | Performed by: INTERNAL MEDICINE

## 2020-02-15 PROCEDURE — 84145 PROCALCITONIN (PCT): CPT | Performed by: INTERNAL MEDICINE

## 2020-02-15 PROCEDURE — 85025 COMPLETE CBC W/AUTO DIFF WBC: CPT | Performed by: INTERNAL MEDICINE

## 2020-02-15 PROCEDURE — 83605 ASSAY OF LACTIC ACID: CPT | Performed by: INTERNAL MEDICINE

## 2020-02-15 PROCEDURE — 36415 COLL VENOUS BLD VENIPUNCTURE: CPT | Performed by: INTERNAL MEDICINE

## 2020-02-15 PROCEDURE — 25000132 ZZH RX MED GY IP 250 OP 250 PS 637: Performed by: INTERNAL MEDICINE

## 2020-02-15 PROCEDURE — 94003 VENT MGMT INPAT SUBQ DAY: CPT

## 2020-02-15 PROCEDURE — 87070 CULTURE OTHR SPECIMN AEROBIC: CPT | Performed by: INTERNAL MEDICINE

## 2020-02-15 PROCEDURE — 0BH17EZ INSERTION OF ENDOTRACHEAL AIRWAY INTO TRACHEA, VIA NATURAL OR ARTIFICIAL OPENING: ICD-10-PCS | Performed by: EMERGENCY MEDICINE

## 2020-02-15 PROCEDURE — 25000128 H RX IP 250 OP 636: Performed by: INTERNAL MEDICINE

## 2020-02-15 PROCEDURE — 71045 X-RAY EXAM CHEST 1 VIEW: CPT | Mod: TC

## 2020-02-15 PROCEDURE — 96375 TX/PRO/DX INJ NEW DRUG ADDON: CPT

## 2020-02-15 PROCEDURE — 25000128 H RX IP 250 OP 636: Performed by: EMERGENCY MEDICINE

## 2020-02-15 PROCEDURE — 99292 CRITICAL CARE ADDL 30 MIN: CPT | Performed by: INTERNAL MEDICINE

## 2020-02-15 PROCEDURE — 00000146 ZZHCL STATISTIC GLUCOSE BY METER IP

## 2020-02-15 PROCEDURE — 40000275 ZZH STATISTIC RCP TIME EA 10 MIN

## 2020-02-15 PROCEDURE — 94640 AIRWAY INHALATION TREATMENT: CPT

## 2020-02-15 PROCEDURE — 99223 1ST HOSP IP/OBS HIGH 75: CPT | Mod: 25 | Performed by: INTERNAL MEDICINE

## 2020-02-15 PROCEDURE — 94644 CONT INHLJ TX 1ST HOUR: CPT

## 2020-02-15 PROCEDURE — 25800030 ZZH RX IP 258 OP 636: Performed by: INTERNAL MEDICINE

## 2020-02-15 PROCEDURE — 40000786 ZZHCL STATISTIC ACTIVE MRSA SURVEILLANCE CULTURE: Performed by: INTERNAL MEDICINE

## 2020-02-15 PROCEDURE — 99291 CRITICAL CARE FIRST HOUR: CPT | Performed by: INTERNAL MEDICINE

## 2020-02-15 PROCEDURE — 87205 SMEAR GRAM STAIN: CPT | Performed by: INTERNAL MEDICINE

## 2020-02-15 RX ORDER — DEXTROSE MONOHYDRATE 100 MG/ML
INJECTION, SOLUTION INTRAVENOUS CONTINUOUS PRN
Status: DISCONTINUED | OUTPATIENT
Start: 2020-02-15 | End: 2020-02-24

## 2020-02-15 RX ORDER — NALOXONE HYDROCHLORIDE 0.4 MG/ML
.1-.4 INJECTION, SOLUTION INTRAMUSCULAR; INTRAVENOUS; SUBCUTANEOUS
Status: DISCONTINUED | OUTPATIENT
Start: 2020-02-15 | End: 2020-02-27 | Stop reason: HOSPADM

## 2020-02-15 RX ORDER — PROPOFOL 10 MG/ML
10-20 INJECTION, EMULSION INTRAVENOUS EVERY 30 MIN PRN
Status: DISCONTINUED | OUTPATIENT
Start: 2020-02-15 | End: 2020-02-15

## 2020-02-15 RX ORDER — ALBUTEROL SULFATE 5 MG/ML
10 SOLUTION, NON-ORAL INHALATION CONTINUOUS
Status: DISCONTINUED | OUTPATIENT
Start: 2020-02-15 | End: 2020-02-15

## 2020-02-15 RX ORDER — PROPOFOL 10 MG/ML
INJECTION, EMULSION INTRAVENOUS
Status: COMPLETED
Start: 2020-02-15 | End: 2020-02-15

## 2020-02-15 RX ORDER — FENTANYL CITRATE 50 UG/ML
25 INJECTION, SOLUTION INTRAMUSCULAR; INTRAVENOUS ONCE
Status: COMPLETED | OUTPATIENT
Start: 2020-02-15 | End: 2020-02-15

## 2020-02-15 RX ORDER — PROPOFOL 10 MG/ML
10-20 INJECTION, EMULSION INTRAVENOUS EVERY 30 MIN PRN
Status: DISCONTINUED | OUTPATIENT
Start: 2020-02-15 | End: 2020-02-17

## 2020-02-15 RX ORDER — PROPOFOL 10 MG/ML
100 INJECTION, EMULSION INTRAVENOUS CONTINUOUS
Status: DISCONTINUED | OUTPATIENT
Start: 2020-02-15 | End: 2020-02-15

## 2020-02-15 RX ORDER — CEFTRIAXONE SODIUM 1 G/50ML
INJECTION, SOLUTION INTRAVENOUS
Status: DISCONTINUED
Start: 2020-02-15 | End: 2020-02-16 | Stop reason: HOSPADM

## 2020-02-15 RX ORDER — PROPOFOL 10 MG/ML
5-100 INJECTION, EMULSION INTRAVENOUS CONTINUOUS
Status: DISCONTINUED | OUTPATIENT
Start: 2020-02-15 | End: 2020-02-15

## 2020-02-15 RX ORDER — ALBUTEROL SULFATE 5 MG/ML
SOLUTION RESPIRATORY (INHALATION)
Status: DISCONTINUED
Start: 2020-02-15 | End: 2020-02-15 | Stop reason: HOSPADM

## 2020-02-15 RX ORDER — PROPOFOL 10 MG/ML
5-100 INJECTION, EMULSION INTRAVENOUS CONTINUOUS
Status: DISCONTINUED | OUTPATIENT
Start: 2020-02-15 | End: 2020-02-17

## 2020-02-15 RX ORDER — PROPOFOL 10 MG/ML
INJECTION, EMULSION INTRAVENOUS
Status: DISCONTINUED
Start: 2020-02-15 | End: 2020-02-15 | Stop reason: HOSPADM

## 2020-02-15 RX ORDER — ONDANSETRON 2 MG/ML
4 INJECTION INTRAMUSCULAR; INTRAVENOUS EVERY 6 HOURS PRN
Status: DISCONTINUED | OUTPATIENT
Start: 2020-02-15 | End: 2020-02-27 | Stop reason: HOSPADM

## 2020-02-15 RX ORDER — HYDROMORPHONE HYDROCHLORIDE 1 MG/ML
0.5 INJECTION, SOLUTION INTRAMUSCULAR; INTRAVENOUS; SUBCUTANEOUS EVERY 6 HOURS
Status: DISCONTINUED | OUTPATIENT
Start: 2020-02-15 | End: 2020-02-19

## 2020-02-15 RX ORDER — NALOXONE HYDROCHLORIDE 0.4 MG/ML
.1-.4 INJECTION, SOLUTION INTRAMUSCULAR; INTRAVENOUS; SUBCUTANEOUS
Status: DISCONTINUED | OUTPATIENT
Start: 2020-02-15 | End: 2020-02-15

## 2020-02-15 RX ORDER — DOXYCYCLINE 100 MG/10ML
INJECTION, POWDER, LYOPHILIZED, FOR SOLUTION INTRAVENOUS
Status: COMPLETED
Start: 2020-02-15 | End: 2020-02-16

## 2020-02-15 RX ORDER — CHLORHEXIDINE GLUCONATE ORAL RINSE 1.2 MG/ML
15 SOLUTION DENTAL EVERY 12 HOURS
Status: DISCONTINUED | OUTPATIENT
Start: 2020-02-15 | End: 2020-02-19

## 2020-02-15 RX ORDER — OSELTAMIVIR PHOSPHATE 75 MG/1
75 CAPSULE ORAL 2 TIMES DAILY
Status: DISCONTINUED | OUTPATIENT
Start: 2020-02-16 | End: 2020-02-23

## 2020-02-15 RX ORDER — ONDANSETRON 4 MG/1
4 TABLET, ORALLY DISINTEGRATING ORAL EVERY 6 HOURS PRN
Status: DISCONTINUED | OUTPATIENT
Start: 2020-02-15 | End: 2020-02-27 | Stop reason: HOSPADM

## 2020-02-15 RX ORDER — ALBUTEROL SULFATE 90 UG/1
6 AEROSOL, METERED RESPIRATORY (INHALATION)
Status: DISCONTINUED | OUTPATIENT
Start: 2020-02-15 | End: 2020-02-26

## 2020-02-15 RX ORDER — ALBUTEROL SULFATE 0.83 MG/ML
2.5 SOLUTION RESPIRATORY (INHALATION) EVERY 4 HOURS
Status: DISCONTINUED | OUTPATIENT
Start: 2020-02-15 | End: 2020-02-15

## 2020-02-15 RX ORDER — ALBUTEROL SULFATE 0.83 MG/ML
2.5 SOLUTION RESPIRATORY (INHALATION)
Status: DISCONTINUED | OUTPATIENT
Start: 2020-02-15 | End: 2020-02-15

## 2020-02-15 RX ORDER — FENTANYL CITRATE 50 UG/ML
INJECTION, SOLUTION INTRAMUSCULAR; INTRAVENOUS
Status: COMPLETED
Start: 2020-02-15 | End: 2020-02-15

## 2020-02-15 RX ORDER — SODIUM CHLORIDE, SODIUM LACTATE, POTASSIUM CHLORIDE, CALCIUM CHLORIDE 600; 310; 30; 20 MG/100ML; MG/100ML; MG/100ML; MG/100ML
INJECTION, SOLUTION INTRAVENOUS CONTINUOUS
Status: DISCONTINUED | OUTPATIENT
Start: 2020-02-15 | End: 2020-02-25

## 2020-02-15 RX ORDER — METHYLPREDNISOLONE SODIUM SUCCINATE 125 MG/2ML
125 INJECTION, POWDER, LYOPHILIZED, FOR SOLUTION INTRAMUSCULAR; INTRAVENOUS EVERY 6 HOURS
Status: DISCONTINUED | OUTPATIENT
Start: 2020-02-15 | End: 2020-02-18

## 2020-02-15 RX ORDER — HYDROMORPHONE HYDROCHLORIDE 1 MG/ML
.3-.5 INJECTION, SOLUTION INTRAMUSCULAR; INTRAVENOUS; SUBCUTANEOUS
Status: DISCONTINUED | OUTPATIENT
Start: 2020-02-15 | End: 2020-02-24

## 2020-02-15 RX ORDER — CEFTRIAXONE SODIUM 1 G/50ML
1 INJECTION, SOLUTION INTRAVENOUS EVERY 24 HOURS
Status: DISCONTINUED | OUTPATIENT
Start: 2020-02-15 | End: 2020-02-23

## 2020-02-15 RX ORDER — PROPOFOL 10 MG/ML
100 INJECTION, EMULSION INTRAVENOUS ONCE
Status: COMPLETED | OUTPATIENT
Start: 2020-02-15 | End: 2020-02-15

## 2020-02-15 RX ADMIN — SODIUM CHLORIDE, POTASSIUM CHLORIDE, SODIUM LACTATE AND CALCIUM CHLORIDE: 600; 310; 30; 20 INJECTION, SOLUTION INTRAVENOUS at 20:08

## 2020-02-15 RX ADMIN — ENOXAPARIN SODIUM 90 MG: 100 INJECTION SUBCUTANEOUS at 07:52

## 2020-02-15 RX ADMIN — CHLORHEXIDINE GLUCONATE ORAL RINSE 15 ML: 1.2 SOLUTION DENTAL at 20:08

## 2020-02-15 RX ADMIN — CEFTRIAXONE SODIUM 1 G: 1 INJECTION, SOLUTION INTRAVENOUS at 23:12

## 2020-02-15 RX ADMIN — PROPOFOL 100 MG: 10 INJECTION, EMULSION INTRAVENOUS at 01:02

## 2020-02-15 RX ADMIN — SODIUM CHLORIDE, POTASSIUM CHLORIDE, SODIUM LACTATE AND CALCIUM CHLORIDE: 600; 310; 30; 20 INJECTION, SOLUTION INTRAVENOUS at 03:39

## 2020-02-15 RX ADMIN — FAMOTIDINE 20 MG: 20 INJECTION, SOLUTION INTRAVENOUS at 20:40

## 2020-02-15 RX ADMIN — PROPOFOL 80 MCG/KG/MIN: 10 INJECTION, EMULSION INTRAVENOUS at 20:08

## 2020-02-15 RX ADMIN — ALBUTEROL SULFATE 6 PUFF: 90 AEROSOL, METERED RESPIRATORY (INHALATION) at 13:32

## 2020-02-15 RX ADMIN — MIDAZOLAM 5 MG: 1 INJECTION INTRAMUSCULAR; INTRAVENOUS at 03:12

## 2020-02-15 RX ADMIN — PROPOFOL 65 MCG/KG/MIN: 10 INJECTION, EMULSION INTRAVENOUS at 06:33

## 2020-02-15 RX ADMIN — CHLORHEXIDINE GLUCONATE ORAL RINSE 15 ML: 1.2 SOLUTION DENTAL at 08:11

## 2020-02-15 RX ADMIN — ALBUTEROL SULFATE 6 PUFF: 90 AEROSOL, METERED RESPIRATORY (INHALATION) at 10:26

## 2020-02-15 RX ADMIN — PROPOFOL 50 MCG/KG/MIN: 10 INJECTION, EMULSION INTRAVENOUS at 04:08

## 2020-02-15 RX ADMIN — HYDROMORPHONE HYDROCHLORIDE 0.5 MG: 1 INJECTION, SOLUTION INTRAMUSCULAR; INTRAVENOUS; SUBCUTANEOUS at 14:21

## 2020-02-15 RX ADMIN — ENOXAPARIN SODIUM 90 MG: 100 INJECTION SUBCUTANEOUS at 18:10

## 2020-02-15 RX ADMIN — MIDAZOLAM 2 MG: 1 INJECTION INTRAMUSCULAR; INTRAVENOUS at 02:40

## 2020-02-15 RX ADMIN — METHYLPREDNISOLONE SODIUM SUCCINATE 125 MG: 125 INJECTION, POWDER, FOR SOLUTION INTRAMUSCULAR; INTRAVENOUS at 09:31

## 2020-02-15 RX ADMIN — ALBUTEROL SULFATE 2.5 MG: 2.5 SOLUTION RESPIRATORY (INHALATION) at 04:03

## 2020-02-15 RX ADMIN — SODIUM CHLORIDE, POTASSIUM CHLORIDE, SODIUM LACTATE AND CALCIUM CHLORIDE: 600; 310; 30; 20 INJECTION, SOLUTION INTRAVENOUS at 11:27

## 2020-02-15 RX ADMIN — METHYLPREDNISOLONE SODIUM SUCCINATE 125 MG: 125 INJECTION, POWDER, FOR SOLUTION INTRAMUSCULAR; INTRAVENOUS at 14:44

## 2020-02-15 RX ADMIN — HYDROMORPHONE HYDROCHLORIDE 0.5 MG: 1 INJECTION, SOLUTION INTRAMUSCULAR; INTRAVENOUS; SUBCUTANEOUS at 08:09

## 2020-02-15 RX ADMIN — METHYLPREDNISOLONE SODIUM SUCCINATE 125 MG: 125 INJECTION, POWDER, FOR SOLUTION INTRAMUSCULAR; INTRAVENOUS at 03:33

## 2020-02-15 RX ADMIN — PROPOFOL 55 MCG/KG/MIN: 10 INJECTION, EMULSION INTRAVENOUS at 09:43

## 2020-02-15 RX ADMIN — HYDROMORPHONE HYDROCHLORIDE 0.5 MG: 1 INJECTION, SOLUTION INTRAMUSCULAR; INTRAVENOUS; SUBCUTANEOUS at 20:36

## 2020-02-15 RX ADMIN — ALBUTEROL SULFATE 6 PUFF: 90 AEROSOL, METERED RESPIRATORY (INHALATION) at 21:01

## 2020-02-15 RX ADMIN — SODIUM CHLORIDE 1000 ML: 9 INJECTION, SOLUTION INTRAVENOUS at 02:52

## 2020-02-15 RX ADMIN — ALBUTEROL SULFATE 10 MG/HR: 2.5 SOLUTION RESPIRATORY (INHALATION) at 05:15

## 2020-02-15 RX ADMIN — FENTANYL CITRATE 25 MCG: 50 INJECTION INTRAMUSCULAR; INTRAVENOUS at 02:52

## 2020-02-15 RX ADMIN — PROPOFOL 70 MCG/KG/MIN: 10 INJECTION, EMULSION INTRAVENOUS at 21:40

## 2020-02-15 RX ADMIN — ALBUTEROL SULFATE 2.5 MG: 2.5 SOLUTION RESPIRATORY (INHALATION) at 02:58

## 2020-02-15 RX ADMIN — FENTANYL CITRATE 25 MCG: 50 INJECTION, SOLUTION INTRAMUSCULAR; INTRAVENOUS at 02:52

## 2020-02-15 RX ADMIN — FAMOTIDINE 20 MG: 10 INJECTION, SOLUTION INTRAVENOUS at 09:32

## 2020-02-15 RX ADMIN — PROPOFOL 50 MCG/KG/MIN: 10 INJECTION, EMULSION INTRAVENOUS at 12:34

## 2020-02-15 RX ADMIN — ALBUTEROL SULFATE 6 PUFF: 90 AEROSOL, METERED RESPIRATORY (INHALATION) at 15:50

## 2020-02-15 RX ADMIN — MIDAZOLAM 2 MG: 1 INJECTION INTRAMUSCULAR; INTRAVENOUS at 04:01

## 2020-02-15 RX ADMIN — PROPOFOL 50 MCG/KG/MIN: 10 INJECTION, EMULSION INTRAVENOUS at 16:08

## 2020-02-15 RX ADMIN — METHYLPREDNISOLONE SODIUM SUCCINATE 125 MG: 125 INJECTION, POWDER, FOR SOLUTION INTRAMUSCULAR; INTRAVENOUS at 20:33

## 2020-02-15 RX ADMIN — MIDAZOLAM HYDROCHLORIDE 5 MG: 5 INJECTION, SOLUTION INTRAMUSCULAR; INTRAVENOUS at 00:12

## 2020-02-15 RX ADMIN — RACEPINEPHRINE HYDROCHLORIDE 0.5 ML: 11.25 SOLUTION RESPIRATORY (INHALATION) at 04:04

## 2020-02-15 ASSESSMENT — ACTIVITIES OF DAILY LIVING (ADL)
ADLS_ACUITY_SCORE: 17
ADLS_ACUITY_SCORE: 19
ADLS_ACUITY_SCORE: 17
ADLS_ACUITY_SCORE: 19
ADLS_ACUITY_SCORE: 19

## 2020-02-15 NOTE — PLAN OF CARE
Face to face report given with opportunity to observe patient.    Report given to Fariba, Min Crockett   2/15/2020  7:37 AM

## 2020-02-15 NOTE — PROVIDER NOTIFICATION
DATE:  2/15/2020   TIME OF RECEIPT FROM LAB:  0829  LAB TEST:  potassium  LAB VALUE:  6.2  RESULTS GIVEN WITH READ-BACK TO (PROVIDER):  {Choose the provider you called     Dr Mercado  TIME LAB VALUE REPORTED TO PROVIDER:   0832

## 2020-02-15 NOTE — PROGRESS NOTES
Patient presented in er with severe shortness of breath.  Breath sounds tight expiratory wheezes bilat.  Duoneb given x2.  ABG drawn. Patient developed decreased loc and intubated with 8.o ETT secured @ 26cm.  Xray obtained.  Pulled back ETT to 24cm and secured.  Vent settings now of AC 16 600 40% peep 5.

## 2020-02-15 NOTE — ED PROVIDER NOTES
"  History     Chief Complaint   Patient presents with     Shortness of Breath     Since DC this a.m.     HPI  Ry Man is a 54 year old male who presents to the ED by ambulance with CC of severe SOB and cough.  He is a smoker and was seen in the ED yesterday with dx of uncontrolled HTN, Influenza A, and L-sided pneumonia.  However, radiologist has since read CXR as just linear scarring at the L lingula.  Pt was started on Tamiflu, Prednisone, and Z-toma.  He has not taken any more meds since leaving the ED early this am.  States that his SOB got much worse again after going outside in the cold air.  Blood cultures are pending from yesterday.  Pt was given Metoprolol 5mg IV (due to very elevated BP) and Albuterol neb by EMS en route to the ED.    Allergies:  Allergies   Allergen Reactions     Ethyl Alcohol, Skin      \"Ethyl Alcohol\"     Lisinopril Other (See Comments)     Cough       Valsartan Rash and GI Disturbance     Diovan       Problem List:    There are no active problems to display for this patient.       Past Medical History:    Past Medical History:   Diagnosis Date     Abnormal liver function test 1/1/2011     Bronchitis, not specified as acute or chronic 2/16/2005     Osteoarthritis 1/1/2011     Tobacco abuse 1/1/2011     Unspecified essential hypertension 9/1/2006       Past Surgical History:    Past Surgical History:   Procedure Laterality Date     cysts removed from neck      Bilateral     vasectomy       wisdom teeth extraction         Family History:    Family History   Problem Relation Age of Onset     C.A.D. Mother      C.A.D. Brother 51        Cause of death     Heart Failure Father 72        CHF, cause of death     Diabetes Brother      Diabetes Mother      Diabetes Father      Hypertension Brother      Other - See Comments Brother         Multiple Sclerosis       Social History:  Marital Status:   [2]  Social History     Tobacco Use     Smoking status: Current Every Day Smoker     " Packs/day: 1.00     Years: 37.00     Pack years: 37.00     Types: Cigarettes     Smokeless tobacco: Never Used     Tobacco comment: Tried to quit; longest tobacco free, 8 years   Substance Use Topics     Alcohol use: No     Drug use: No        Medications:    albuterol (PROAIR HFA/PROVENTIL HFA/VENTOLIN HFA) 108 (90 Base) MCG/ACT inhaler  ALLOPURINOL PO  amLODIPine (NORVASC) 10 MG tablet  amLODIPine (NORVASC) 10 MG tablet  azithromycin (ZITHROMAX) 250 MG tablet  beta carotene 17030 UNIT capsule  Cholecalciferol (VITAMIN D PO)  Multiple Vitamin (MULTI VITAMIN MENS PO)  Nutritional Supplements (DHEA PO)  oseltamivir (TAMIFLU) 75 MG capsule  predniSONE (DELTASONE) 20 MG tablet  simvastatin (ZOCOR) 40 MG tablet  simvastatin (ZOCOR) 40 MG tablet  Vitamins C E (VITAMIN C & E COMBINATION PO)          Review of Systems   Respiratory: Positive for cough, shortness of breath and wheezing.    All other systems reviewed and are negative.      Physical Exam   BP: (!) 215/137  Pulse: 103  Heart Rate: 104  Temp: 98.7  F (37.1  C)  Resp: (!) 32  SpO2: 98 %      Physical Exam  Vitals signs and nursing note reviewed.   Constitutional:       General: He is in acute distress (due to SOB).      Appearance: He is well-developed.   HENT:      Head: Normocephalic.      Mouth/Throat:      Mouth: Mucous membranes are moist.      Pharynx: Oropharynx is clear.   Eyes:      Extraocular Movements: Extraocular movements intact.      Pupils: Pupils are equal, round, and reactive to light.   Cardiovascular:      Rate and Rhythm: Regular rhythm. Tachycardia present.      Pulses: Normal pulses.      Heart sounds: Normal heart sounds.   Pulmonary:      Effort: Tachypnea, accessory muscle usage and respiratory distress present.      Breath sounds: Examination of the right-upper field reveals decreased breath sounds and wheezing. Examination of the left-upper field reveals decreased breath sounds and wheezing. Examination of the right-middle field  reveals decreased breath sounds. Examination of the left-middle field reveals decreased breath sounds. Examination of the right-lower field reveals decreased breath sounds. Examination of the left-lower field reveals decreased breath sounds. Decreased breath sounds and wheezing present.   Abdominal:      General: Bowel sounds are normal.      Palpations: Abdomen is soft.      Tenderness: There is no abdominal tenderness.   Musculoskeletal: Normal range of motion.   Skin:     General: Skin is warm and dry.      Capillary Refill: Capillary refill takes 2 to 3 seconds.   Neurological:      General: No focal deficit present.      Mental Status: He is alert and oriented to person, place, and time.   Psychiatric:         Mood and Affect: Mood is anxious.       CTA chest -  No definite acute PE but somewhat limited study, small focal infiltrate within lingula vs atelectasis    ED Course   Duoneb x 2, Decadron 10mg IV, Ativan 0.5mg IV, Labetalol 20mg IV x 2     Procedures Pt had progressive respiratory decompensation while in the ED with increased work of breathing and exhaustion.  He was intubated with #4 Mac blade and #8.0 ETT without difficutly.  Stylet and cricoid pressure were used.  Placement was confirmed by bilateral auscultation, ETCO2, and CXR.  ETT was taped at 24cm at the lips.    IV drugs used included 20mg IV Etomidate f/b 100mg IV Succinylcholine  f/b 10mg IV Vecuronium.  Pt was then sedated further with Versed 5mg IV.               Critical Care time:  35 minutes               Results for orders placed or performed during the hospital encounter of 02/14/20 (from the past 24 hour(s))   Blood gas arterial and oxyhgb   Result Value Ref Range    pH Arterial 7.30 (L) 7.35 - 7.45 pH    pCO2 Arterial 46 (H) 35 - 45 mm Hg    pO2 Arterial 80 80 - 105 mm Hg    Bicarbonate Arterial 22 21 - 28 mmol/L    FIO2 40%     Oxyhemoglobin Arterial 91 (L) 92 - 100 %    Base Deficit Art 4.5 mmol/L   Basic metabolic panel   Result  Value Ref Range    Sodium 136 133 - 144 mmol/L    Potassium 4.7 3.4 - 5.3 mmol/L    Chloride 104 94 - 109 mmol/L    Carbon Dioxide 24 20 - 32 mmol/L    Anion Gap 8 3 - 14 mmol/L    Glucose 184 (H) 70 - 99 mg/dL    Urea Nitrogen 27 7 - 30 mg/dL    Creatinine 0.87 0.66 - 1.25 mg/dL    GFR Estimate >90 >60 mL/min/[1.73_m2]    GFR Estimate If Black >90 >60 mL/min/[1.73_m2]    Calcium 9.2 8.5 - 10.1 mg/dL   CBC with platelets differential   Result Value Ref Range    WBC 18.9 (H) 4.0 - 11.0 10e9/L    RBC Count 5.15 4.4 - 5.9 10e12/L    Hemoglobin 16.3 13.3 - 17.7 g/dL    Hematocrit 48.2 40.0 - 53.0 %    MCV 94 78 - 100 fl    MCH 31.7 26.5 - 33.0 pg    MCHC 33.8 31.5 - 36.5 g/dL    RDW 13.4 10.0 - 15.0 %    Platelet Count 233 150 - 450 10e9/L    Diff Method Automated Method     % Neutrophils 93.3 %    % Lymphocytes 2.0 %    % Monocytes 3.9 %    % Eosinophils 0.0 %    % Basophils 0.2 %    % Immature Granulocytes 0.6 %    Nucleated RBCs 0 0 /100    Absolute Neutrophil 17.7 (H) 1.6 - 8.3 10e9/L    Absolute Lymphocytes 0.4 (L) 0.8 - 5.3 10e9/L    Absolute Monocytes 0.7 0.0 - 1.3 10e9/L    Absolute Eosinophils 0.0 0.0 - 0.7 10e9/L    Absolute Basophils 0.0 0.0 - 0.2 10e9/L    Abs Immature Granulocytes 0.1 0 - 0.4 10e9/L    Absolute Nucleated RBC 0.0    D dimer quantitative   Result Value Ref Range    D Dimer 1.3 (H) 0.0 - 0.50 ug/ml FEU   Lactic acid whole blood   Result Value Ref Range    Lactic Acid 1.5 0.7 - 2.0 mmol/L   Nt probnp inpatient (BNP)   Result Value Ref Range    N-Terminal Pro BNP Inpatient 569 0 - 900 pg/mL   Troponin I   Result Value Ref Range    Troponin I ES <0.015 0.000 - 0.045 ug/L       Medications   ipratropium - albuterol 0.5 mg/2.5 mg/3 mL (DUONEB) neb solution 3 mL (3 mLs Nebulization Given 2/14/20 2053)   dexamethasone (DECADRON) injection 10 mg (10 mg Intravenous Given 2/14/20 2122)   labetalol (NORMODYNE/TRANDATE) syringe 20 mg (20 mg Intravenous Given 2/14/20 2122)   ipratropium - albuterol 0.5  mg/2.5 mg/3 mL (DUONEB) neb solution 3 mL (3 mLs Nebulization Given 2/14/20 2119)   oseltamivir (TAMIFLU) capsule 75 mg (75 mg Oral Given 2/14/20 2140)   LORazepam (ATIVAN) injection 0.5 mg (0.5 mg Intravenous Given 2/14/20 2210)   labetalol (NORMODYNE/TRANDATE) syringe 20 mg (20 mg Intravenous Given 2/14/20 2235)   sodium chloride (PF) 0.9% PF flush 100 mL (50 mLs Intravenous Given 2/14/20 2321)   iopamidol (ISOVUE-370) solution 50 mL (50 mLs Intravenous Given 2/14/20 2324)   midazolam (VERSED) injection 5 mg (5 mg Intravenous Given 2/15/20 0012)       Assessments & Plan (with Medical Decision Making)     I have reviewed the nursing notes.    I have reviewed the findings, diagnosis, and plan with pt's daughter who is at the bedside.  Hospitalist Dr. Grimaldo agrees to accept pt as admission to ICU bed and met with him in the ED.       New Prescriptions    No medications on file       Final diagnoses:   Influenza A   Acute respiratory failure with hypoxia (H)       2/14/2020   [facility]     Courtney Rivera DO  02/15/20 0043

## 2020-02-15 NOTE — PLAN OF CARE
Right wrist and Left wrist restraints initiated on patient on 2/15/2020 at 4:41 AM    Clinical Justification: Pulling lines, pulling tubes, and pulling equipment  Less Restrictive Alternative: Repositioning  Attending Physician Notified: MD ordered restraint, Attending Physician's Name: Dr. Grimaldo   Order received: Yes     Family Notification: Other(Daughter)   Criteria explained to daughter  Patient's Response: No evidence of learning  Restraint care Plan initiated: Yes    Mateo Crockett

## 2020-02-15 NOTE — PLAN OF CARE
Pt sedated upon arrival to floor. Vent settings currently at Rate 12, Tv 650, PEEP 10, FiO2 40%. HRR tele reading SR with no ectopy. Lungs are very diminished with minimal air movement and inspiratory and expiratory wheezes bilaterally. Sats in 90s entire shift. Pupils equal and reactive. Propofol currently running at 60mcg/kg/min along with LR at 125 Mls/hr, versed pushes given per Dr. Grimaldo, refer to mar for doses and times. 1L bolus of NS given for low Bps. Pt getting scheduled nebs and we did try one epi neb without any improvement. Talked with tele icu and made some changes per their recommendations. Pt has romero that is QS. OG placed this AM at 58 at the lip hooked to LIS. Small abscess noted on rt lateral thigh that seems to be chronic, original dressing covered with drainage and odorous, placed new dressing and no drainage currently noted.  Pt on droplet from influenza A. Refer to flowsheets for any other documentation. No other significant events, will continue to monitor.

## 2020-02-15 NOTE — H&P
[facility]    History and Physical  Hospitalist       Date of Admission:  2/14/2020    Assessment & Plan   Ry Man is a 54 year old male who presents with worsening shortness of breath the last 2 days, developed acute respiratory failure, was intubated in the emergency room.  1 day prior to admission he was diagnosed with influenza.    Active Problems:  Acute respiratory failure with hypercapnia hypoxia (H), developing respiratory acidosis.    Assessment: Rapid progressing respiratory distress to respiratory failure while in the emergency room.  I believe to underlying severe bronchospasm due to COPD and influenza.    Plan: He is intubated, sedated with propofol drip and as needed Versed.  We will try racemic epinephrine to relieve severe bronchospasm.  I have discussed this difficult ventilating case with Dr. Ceron at [facility] and he agreed with trying epinephrine racemic, high-dose steroids.  He also suggested that if he will continue having problems ventilating he might require transfer.    Low blood pressure  Assessment: believe this is due to propofol  Plan: Decrease propofol, give Versed PRN.  I will also give bolus normal saline 1000×1.      Influenza A    Assessment: Present on admission and treatment started at admission which was 2/14/2020.    Plan: To new Tamiflu 75 mg p.o.    Benign essential hypertension  Assessment: This by history and his home medications listed amlodipine as the primary medication for hypertension. Hypotensive now due to propofol and positive pressure ventilation  Plan: Hold amlodipine and close monitoring.    Wheezing We will continue steroids high-dose and albuterol every hour hour. Will try racemic epinephrine. This might help if bronchospasm due to iodine contrast dye.     Right thigh abscess  Assessment: present on admission. Very small opening to surface and no surrounding skin infection. Doubt it is of any clinical significance, will address when  stable.   Plan: will assess when stable. Dry dressings applied.       DVT Prophylaxis: Enoxaparin (Lovenox) SQ  Code Status: Full Code    Disposition: Expected discharge in 3-4 days once respiratory status improved and stable..    Devin Grimaldo    Primary Care Physician   Physician No Ref-Primary    Chief Complaint dyspnea    Reason for Admission: acute respiratory failure, influenza A    Admission status: inpatient, ICU    History is obtained from the electronic health record and emergency department physician    History of Present Illness Limited. Pt is intubated, sedated. Information obtained from computerized health records and ED physician.     Ry Man is a 54 year old man with past medical history significant for dyslipidemia, hypertension, questionable COPD, who smokes came to emergency room 1 day prior to admission with worsening shortness of breath.  He was diagnosed with influenza A, discharged home on Tamiflu and prednisone.  Pneumonia was suspected.  Is also count was 14.  ABGs on first emergency room visit was unremarkable with pH 7.4, PCO2 37 and PaO2 arterial 66 on room air.  He comes back today with worsening shortness of breath.  He states that his shortness of breath got much worse.  EMS in route gave him metoprolol 5 mg IV and albuterol nebulizer.  Metoprolol IV due to very high blood pressure, albuterol nebs.  He also complained of cough and wheezing.  Presented mildly tachycardic, afebrile, very hypertensive and required oxygen supplementation through nonrebreather mask.  ED work-up today showed unremarkable chemistry with normal lactic acid, normal BNP and normal troponin.  Blood cell count was higher than day prior, his hemoglobin was 16.3 and normal platelets.  His INR was 1.3 and CT chest was ordered to rule out PE.  On the way back from radiology facility patient became unresponsive and he was intubated in the emergency room.  He was intubated with ET tube #8.  Placement was confirmed  by bilateral auscultation, ET CO2, and chest x-ray.  Rapid sequence intubation 20 mg IV etomidate and succinylcholine was used.  Patient was given additionally 5 mg Versed.    ED events: Acute respiratory failure developed and patient was intubated.    ED diagnostic workup: As per history of present illness    ED imaging studies and blood test results: As per history of present illness.  CT read by virtual radiologist and was negative for PE.    ED treatment:  ipratropium - albuterol 0.5 mg/2.5 mg/3 mL (DUONEB) neb solution 3 mL (3 mLs Nebulization Given 2/14/20 2053)   dexamethasone (DECADRON) injection 10 mg (10 mg Intravenous Given 2/14/20 2122)   labetalol (NORMODYNE/TRANDATE) syringe 20 mg (20 mg Intravenous Given 2/14/20 2122)   ipratropium - albuterol 0.5 mg/2.5 mg/3 mL (DUONEB) neb solution 3 mL (3 mLs Nebulization Given 2/14/20 2119)   oseltamivir (TAMIFLU) capsule 75 mg (75 mg Oral Given 2/14/20 2140)   LORazepam (ATIVAN) injection 0.5 mg (0.5 mg Intravenous Given 2/14/20 2210)   labetalol (NORMODYNE/TRANDATE) syringe 20 mg (20 mg Intravenous Given 2/14/20 2235)   sodium chloride (PF) 0.9% PF flush 100 mL (50 mLs Intravenous Given 2/14/20 2321)   iopamidol (ISOVUE-370) solution 50 mL (50 mLs Intravenous Given 2/14/20 2324)   midazolam (VERSED) injection 5 mg (5 mg Intravenous Given 2/15/20 0012)         Past Medical History    I have reviewed this patient's medical history and updated it with pertinent information if needed.   Past Medical History:   Diagnosis Date     Abnormal liver function test 1/1/2011     Bronchitis, not specified as acute or chronic 2/16/2005     Osteoarthritis 1/1/2011     Tobacco abuse 1/1/2011     Unspecified essential hypertension 9/1/2006       Past Surgical History   I have reviewed this patient's surgical history and updated it with pertinent information if needed.  Past Surgical History:   Procedure Laterality Date     cysts removed from neck      Bilateral     vasectomy    "    wisdom teeth extraction         Prior to Admission Medications   Prior to Admission Medications   Prescriptions Last Dose Informant Patient Reported? Taking?   ALLOPURINOL PO  Self Yes No   Sig: Take 300 mg by mouth daily   Cholecalciferol (VITAMIN D PO)   Yes No   Sig: Take  by mouth.   Multiple Vitamin (MULTI VITAMIN MENS PO)   Yes No   Sig: Take  by mouth.   Nutritional Supplements (DHEA PO)   Yes No   Sig: Take  by mouth.   Vitamins C E (VITAMIN C & E COMBINATION PO)   Yes No   Sig: Take  by mouth.   albuterol (PROAIR HFA/PROVENTIL HFA/VENTOLIN HFA) 108 (90 Base) MCG/ACT inhaler   No No   Sig: Inhale 1-2 puffs into the lungs every 4 hours as needed for shortness of breath / dyspnea or wheezing   amLODIPine (NORVASC) 10 MG tablet   No No   Sig: Take 1 tablet by mouth daily.   amLODIPine (NORVASC) 10 MG tablet   No No   Sig: Take 1 tablet (10 mg) by mouth daily   azithromycin (ZITHROMAX) 250 MG tablet   No No   Sig: Take 1 tablet (250 mg) by mouth daily   beta carotene 61065 UNIT capsule   Yes No   Sig: Take 25,000 Units by mouth daily.   oseltamivir (TAMIFLU) 75 MG capsule   No No   Sig: Take 1 capsule (75 mg) by mouth 2 times daily for 5 days   predniSONE (DELTASONE) 20 MG tablet   No No   Sig: Take two tablets (= 40mg) each day for 5 (five) days   simvastatin (ZOCOR) 40 MG tablet   No No   Sig: Take 1 tablet by mouth At Bedtime.   simvastatin (ZOCOR) 40 MG tablet   No No   Sig: Take 1 tablet (40 mg) by mouth At Bedtime      Facility-Administered Medications: None     Allergies   Allergies   Allergen Reactions     Ethyl Alcohol, Skin      \"Ethyl Alcohol\"     Lisinopril Other (See Comments)     Cough       Valsartan Rash and GI Disturbance     Diovan       Social History   I have reviewed this patient's social history and updated it with pertinent information if needed. Ry Man  reports that he has been smoking cigarettes. He has a 37.00 pack-year smoking history. He has never used smokeless tobacco. He " reports that he does not drink alcohol or use drugs.    Family History   I have reviewed this patient's family history and updated it with pertinent information if needed.   Family History   Problem Relation Age of Onset     C.A.D. Mother      C.A.D. Brother 51        Cause of death     Heart Failure Father 72        CHF, cause of death     Diabetes Brother      Diabetes Mother      Diabetes Father      Hypertension Brother      Other - See Comments Brother         Multiple Sclerosis       Review of Systems   Unable to obtain. The time I saw him in ED he was already intubated.     Physical Exam   Temp: 98.7  F (37.1  C) Temp src: Oral BP: (!) 85/61 Pulse: 63 Heart Rate: 64 Resp: 21 SpO2: 93 % O2 Device: Mechanical Ventilator Oxygen Delivery: 10 LPM  Vital Signs with Ranges  Temp:  [98.1  F (36.7  C)-98.7  F (37.1  C)] 98.7  F (37.1  C)  Pulse:  [] 63  Heart Rate:  [] 64  Resp:  [10-33] 21  BP: ()/() 85/61  FiO2 (%):  [40 %] 40 %  SpO2:  [92 %-98 %] 93 %  0 lbs 0 oz    Physical exam:   GENERAL: intubated sedated, PPV  HEENT: NC/AT. Exophthalmos. ETT in place.  NECK: trachea midline, no JVD.   CARDIAC: regular, normal S1,S2, no S3. No murmurs, gallops or rubs. No bruits in the neck. No peripheral edema.    PULMONARY:severely diminished BS with prolonged expiration and wheezing. Diffuse.  GI: abdomen not distended and not tender on deep and superficial palpation, bowel sounds present. No hepatosplenomegaly or pulsatile masses.   : CVA not tender, bladder not palpable.  MUSCULOSKELETAL: very small opening from lateral right thigh draining puss.   NEUROLOGICAL: intubated, sedated, soft restrains.   PSYCHIATRIC: unable to assess. He is intubated.  INTEGUMENT: no rash, no ulcerations, warm, dry. Small draining puss opening R upper lateral thigh. No surrounding skin erythema.   LYMPHATIC/HEMATOLOGIC: no LAD in the neck, both axillae, and groins. No petechiae, no ecchymoses.       Goals of care were  discussed with the patient and family which included but not limited to anticipated treatment course during the current hospitalization, recovery from current event, discharge planning and transitions of care responsibilities and expected outcomes. Code status was addressed on admission as well. End of life care discussion not done.    Data   Data reviewed today:  I personally reviewed EKG and chest XR.  Radiologist reviewed: CT chest. NO definitive PE    Recent Labs   Lab 02/14/20  2106 02/14/20  0511   WBC 18.9* 14.0*   HGB 16.3 16.3   MCV 94 93    234    139   POTASSIUM 4.7 4.2   CHLORIDE 104 108   CO2 24 25   BUN 27 17   CR 0.87 0.89   ANIONGAP 8 6   CARLTON 9.2 8.7   * 111*   ALBUMIN  --  4.4   PROTTOTAL  --  8.3   BILITOTAL  --  0.4   ALKPHOS  --  94   ALT  --  28   AST  --  22   TROPI <0.015 <0.015       Recent Results (from the past 24 hour(s))   XR Chest Port 1 View    Narrative    PROCEDURE:  XR CHEST PORT 1 VW    HISTORY:  dyspnea.     COMPARISON:  None.    FINDINGS:   The cardiac silhouette is normal in size. The pulmonary vasculature is  normal.  There is some linear atelectasis or scarring in the lingula.  No pleural effusion or pneumothorax.      Impression    IMPRESSION:  Lingular atelectasis or scarring      MURTAZA ECHOLS MD

## 2020-02-15 NOTE — PROGRESS NOTES
"[facility]    Hospitalist Progress Note    Date of Service (when I saw the patient): 02/15/2020    Assessment & Plan   Ry Man is a 54 year old  man who was admitted on 2/14/2020. This is a 54-year-old man who presented twice to emergency department.  On first evaluation rapid screen showed influenza.  He apparently was otherwise compensated although remained tachypneic and was discharged from emergency department treated with oseltamivir and prednisone.  He returned with increased dyspnea and hypertension.  Arterial blood gas analysis showed combined respiratory metabolic acidosis (ABG 7.4/37 on first visit) he showed moderate leukocytosis.  Examination showed significant bronchospasm with diffuse wheezing.  Taken to CT for CT of the chest \"angiogram\" and became unresponsive on transport back to emergency department.  Emergently intubated in the emergency department.  Since then oxygenation has been adequate on relatively low FiO2.   Lactate has never been elevated.    E 86     Prior records from outpatient reviewed.  The patient does carry a history of COPD with at least several exacerbations during 2019.  Pulmonary function testing indicated on outpatient records although I do not find direct records of pulmonary function testing in the [facility] or our records.  FEV1/FVC 50%.  FEV1 33%.   DLCO 42% predicted.  % predicted with % predicted.  Reversibility was indicated.  The patient continues to smoke cigarettes with a 70-pack-year history.     1.    Mechanical ventilatory support  Acute hypercapnic respiratory failure  Overall course is been most consistent with prominent airflow obstruction, likely COPD with significant bronchospasm.  He is required multiple alterations in mechanical ventilatory support but at this point continuing him mildly elevated tidal volume of 0.65 L and minimizing respiratory frequency with sedation/analgesia seems the best approach. Continue PEEP of 10 added " largely for triggering and although he still has significant ineffective ventilatory efforts.  If this continues to be a more prominent issue neuromuscular blockade can be considered.  Overall, however, gas exchange and metabolic acidosis are improving.  Repeat arterial blood gas analysis later this afternoon.  To the extent required continue permissive hypercapnia.  pH in a range of 7.2 in this circumstance is not so low as to expect any hemodynamic or other untoward complications.  Continue frequent short acting bronchodilators and relatively higher dose of systemic steroids.  Oxygenation continues to be adequate with relatively low FiO2.  Would avoid continuous nebulization of bronchodilators as of this adds biased flow and will worsen airflow obstruction.  Unlikely that agent such as racemic epinephrine will have a significant effect in large airways in adult patients.   2.  Influenza infection  Likely the prominent cause of his bronchospasm/increased airflow obstruction.  As below likely poorly controlled COPD with airway reactivity.  Consider oseltamivir at treatment doses.  3.  COPD with airway reactivity  Pulmonary function studies from 2017 as detailed above.  This demonstrates airflow obstruction as well as airway reactivity.  Air trapping without significant hyperinflation.  Unfortunately I do not have direct tracings of this available.  Treatment as above including bronchodilators and steroids.  Dr. Garland note does indicate that he has had, at least as of last year when he was last evaluated as an outpatient, frequent exacerbations.  We will discuss this further with the patient when he is able to be more interactive.  4.  Tobacco use disorder  At least a 70-pack-year history of tobacco abuse.  Nicotine replacement when this is indicated.  Will discuss with the patient when he is able to be more interactive tobacco cessation.  5.  Possible pulmonary embolism  CT from last night while technically  limited study did suggest thromboembolic disease.  Currently excessively high risk for repeat CT imaging.  Treat empirically for pulmonary embolism with enoxaparin every 12 hours.  Depending on his course repeat CT in another 24-36 hours if his clinical condition otherwise improves.  However may be necessary to commit him to at least 2-3 months of anticoagulation.  May be most reasonable to regard this as a provoked pulmonary embolism if in fact one is present.  6.  Lung nodule  This was indicated on a 2016 CT obtained at [facility].  Report indicates nodule suggestive of atelectasis in left upper lobe abutting the major fissure.  History indicates a subcentimeter nodule.  Dimensions are not noted in CT report.  Current study shows what is likely to be a similar appearing nodular area of decreased attenuation at the major fissure.  It is most prominent in the inferior lingula abutting the heart border with what are likely to be continuous nodular areas of decreased attenuation more cephalad in the right upper lobe.  Of note follow-up plain chest radiographs obtained during the course of the morning today show less prominent nodular infiltrates at the left heart border also suggestive of atelectasis.  Certainly follow-up in the near future as was recommended previously would be indicated.  7.  Nutritional support  Suspect he is going to require at least several days of mechanical ventilatory support.    Will initiate enteral feeds.  8.  Hyperglycemia of acute illness  Glucose levels have been persistently in a range of 140-160.  Not unexpected given critical illness as well as steroid therapy.  Add short acting sliding scale insulin.  Not unreasonable to evaluate with hemoglobin A1c but persistent hyperglycemia or zachery diabetes less likely.  Glucose is likely to increase with initiation of enteral feeds.  9.  Hyperkalemia  Transient hyperkalemia earlier in the day today.  Possibly this reflects timing with  intermittent beta agonist which would be expected to decrease potassium levels.  Can eliminate muscular injury.  Renal function has been adequate.  Doubt significant rhabdomyolysis.  Evaluate with CK.  Intermittent potassium measurements but repeat so far does not show persistent hyperkalemia.    This patient remains critically ill at substantial risk of life-threatening deterioration. I have been present at the bedside on multiple occasions for serial evaluation as well as directing ongoing therapy including intensive mechanical ventilatory support.  Cumulative critical care time today in evaluation and management exclusive of procedures  90 minutes.    Active Problems:    Influenza A    Acute respiratory failure with hypoxia (H)    Benign essential hypertension    Respiratory failure (H)       DVT Prophylaxis: Full dose anticoagulation with enoxaparin  Code Status: Full Code    Disposition: Expected discharge difficult to predict.  Minimum of several further days of critical care management in ICU.  Pola Samson    Interval History   Arouses with some agitation to vigorous tactile stimulation.  Withdrawal in flexor and extensor groups.  Appears comfortable when not stimulated.    -Data reviewed today: I reviewed all new labs and imaging results over the last 24 hours. I personally reviewed CT of chest obtained here last night with comparison of report in [facility] several years ago.  Results as above.  Multiple chest radiographs show tracheal tube slightly high in trachea with gastric tube in gastric fundus.  No focal infiltrate with exception of somewhat variable nodular density in inferior lingula abutting heart border.    Peripheral IV 02/14/20 Right Lower forearm (Active)   Site Assessment WDL 2/15/2020 11:15 AM   Line Status Saline locked 2/15/2020 11:15 AM   Phlebitis Scale 0-->no symptoms 2/15/2020 11:15 AM   Infiltration Scale 0 2/15/2020 11:15 AM   Number of days: 1       Peripheral IV 02/14/20 Left  (Active)   Site Assessment WDL 2/15/2020 11:15 AM   Line Status Infusing 2/15/2020 11:15 AM   Phlebitis Scale 0-->no symptoms 2/15/2020 11:15 AM   Infiltration Scale 0 2/15/2020 11:15 AM   Number of days: 1       ETT 8 (Active)   Secured By Commercial tube pérez 2/15/2020 10:20 AM   Site Appearance Clean and dry 2/15/2020 10:20 AM   Secured at (cm) to lip 24 cm 2/15/2020 10:20 AM   Site of ET Tube Securement At lip 2/15/2020  8:07 AM   Tube Care/Reposition repositioned tube left side of mouth 2/15/2020 10:20 AM   Bite Block Left 2/15/2020 10:20 AM   Safety Measures manual resuscitator at bedside 2/15/2020 10:20 AM   Number of days: 1       NG/OG/N[place] Tube Orogastric Center mouth (Active)   Site Description WDL 2/15/2020  8:27 AM   Drainage Appearance Other (comment) 2/15/2020  8:27 AM   Placement Confirmation [place] unchanged;X-ray 2/15/2020  8:27 AM   [place] (cm marking) at nare/mouth 58 cm 2/15/2020  8:27 AM   Number of days: 0       Urethral Catheter Non-latex 16 fr (Active)   Tube Description Positional 2/15/2020 11:27 AM   Catheter Care Done;Catheter wipes 2/15/2020 11:27 AM   Collection Container Standard 2/15/2020 11:27 AM   Securement Method Securing device (Describe) 2/15/2020 11:27 AM   Rationale for Continued Use Strict 1-2 Hour I&O 2/15/2020 11:27 AM   Urine Output 75 mL 2/15/2020 11:27 AM   Number of days: 1     Line/device assessment(s) completed for medical necessity    Physical Exam   Temp: 100.9  F (38.3  C) Temp src: Tympanic BP: 92/59 Pulse: 64 Heart Rate: 64 Resp: 16 SpO2: 93 % O2 Device: Mechanical Ventilator Oxygen Delivery: 10 LPM  Vitals:    02/15/20 0400   Weight: 92.5 kg (203 lb 14.8 oz)     Vital Signs with Ranges  Temp:  [97.6  F (36.4  C)-100.9  F (38.3  C)] 100.9  F (38.3  C)  Pulse:  [] 64  Heart Rate:  [] 64  Resp:  [14-32] 16  BP: ()/() 92/59  FiO2 (%):  [40 %-55 %] 40 %  SpO2:  [91 %-99 %] 93 %  I/O last 3 completed shifts:  In: 646 [I.V.:646]  Out: 375  [Urine:375]    Arousable with mild agitation.  HEENT: Pupils equal, conjugate. No icterus or nystagmus. Oral mucosa moist. No facial asymmetry.   Neck: Supple, jugular veins not elevated. Trachea midline   Chest: No chest wall movement asymmetry. Aeration diminished. Accessory muscles not in use.  Soft expiratory tidal wheezing in all lung fields few scattered sonorous rhonchi. No discrete crackles.   Cardiac: PMI not displaced. S1, S2 unremarkable. No S3, S4. P2 not accentuated. No murmurs.    Abdomen: Soft. No palpation or percussion tenderness. No distention. Normoactive bowel sounds. Liver and spleen not increased in size. No bruits, masses, or pulsations.   Extremities: No lower extremity edema.  Warm distally.  Brisk capillary refill.  No eccymoses.   Neurologic: Mental state above. Motor 5/5 and bilaterally equal. Tone preserved. No fasiculations or tremors. DTR 1-2/4 and bilaterally equal.     Medications     lactated ringers 125 mL/hr at 02/15/20 1127     propofol (DIPRIVAN) infusion 50 mcg/kg/min (02/15/20 1234)       albuterol         chlorhexidine  15 mL Mouth/Throat Q12H     enoxaparin ANTICOAGULANT  1 mg/kg Subcutaneous Q12H     famotidine  20 mg Intravenous Q12H     HYDROmorphone  0.5 mg Intravenous Q6H     methylPREDNISolone  125 mg Intravenous Q6H     [START ON 2/16/2020] oseltamivir  75 mg Oral BID           Data   Recent Labs   Lab 02/15/20  0950 02/15/20  0526 02/14/20  2106 02/14/20  0511   WBC  --  15.4* 18.9* 14.0*   HGB  --  14.2 16.3 16.3   MCV  --  98 94 93   PLT  --  183 233 234    138 136 139   POTASSIUM 5.2 6.2* 4.7 4.2   CHLORIDE 110* 110* 104 108   CO2 22 22 24 25   BUN 36* 32* 27 17   CR 1.17 1.05 0.87 0.89   ANIONGAP 6 6 8 6   CARLTON 8.0* 7.9* 9.2 8.7   * 149* 184* 111*   ALBUMIN  --  3.6  --  4.4   PROTTOTAL  --  6.8  --  8.3   BILITOTAL  --  0.3  --  0.4   ALKPHOS  --  77  --  94   ALT  --  34  --  28   AST  --  25  --  22   TROPI  --   --  <0.015 <0.015     Recent Labs    Lab 02/15/20  0754 02/15/20  0556 02/15/20  0415 02/15/20  0227   PH 7.29* 7.25* 7.19* 7.16*   PCO2 47* 52* 61* 64*   PO2 66* 89 95 94   HCO3 22 23 23 23   O2PER 40% 45 45 55       Lactic Acid   Date Value Ref Range Status   02/14/2020 1.5 0.7 - 2.0 mmol/L Final   02/14/2020 1.5 0.7 - 2.0 mmol/L Final       Recent Results (from the past 24 hour(s))   CTA Chest with Contrast    Narrative    PROCEDURE: CTA CHEST WITH CONTRAST 2/14/2020 11:28 PM    HISTORY: PE suspected, high pretest prob    COMPARISONS: None.    Meds/Dose Given: ocvnio301- 50ml given    TECHNIQUE: CT angiogram of the chest with sagittal coronal MIP  reconstructions    FINDINGS: CT angiogram reveals suboptimal opacification of the  pulmonary arteries. There is a questionable pulmonary embolus seen in  the left main pulmonary artery on axial image series 4 image 76. There  are questionable bilateral pulmonary emboli in pulmonary artery  branches although these were not well visualized. The heart is normal  in size. The thoracic aorta appears normal. There is some rounded  increased density adjacent to the cardiac apex in the lingula  follow-up of these opacities is recommended. The regional skeleton is  intact. The upper portion of the liver spleen and pancreas appear  normal. There is a large cyst in the left kidney.         Impression    IMPRESSION: A suboptimal study was achieved but there is a strong  suggestion of bilateral pulmonary emboli. The hospitalist was notified  of this finding at 6:54 AM.    MURTAZA ECHOLS MD   XR Chest Port 1 View    Narrative    PROCEDURE:  XR CHEST PORT 1 VW    HISTORY:  ETT placement.     COMPARISON:  February 14, 2020    FINDINGS:   The cardiac silhouette is normal in size. The pulmonary vasculature is  normal.  There is some left lungs scarring noted in the lingula. There  is an endotracheal tube its tip approximately 4 cm above the nahomy No  pleural effusion or pneumothorax.      Impression    IMPRESSION:   Endotracheal tube with its tip approximately 4 cm above  the nahomy.      MURTAZA ECHOLS MD   XR Chest Port 1 View    Narrative    PROCEDURE:  XR CHEST PORT 1 VW    HISTORY:  respiratory failure.     COMPARISON:  February 14, 2020    FINDINGS:   The cardiac silhouette is normal in size. The pulmonary vasculature is  normal.  There is some lingular atelectasis or scarring noted  unchanged. There is an endotracheal tube with its tip 6.5 cm above the  nahomy. No pleural effusion or pneumothorax.      Impression    IMPRESSION:  Lingular atelectasis or scarring without change      MURTAZA ECHOLS MD   XR Chest Port 1 View    Narrative    PROCEDURE:  XR CHEST PORT 1 VW    HISTORY:  gastric tube placement; intubated.     COMPARISON:  None.    FINDINGS:   The cardiac silhouette is normal in size. The pulmonary vasculature is  normal.  There is persistent area of increased density in the lingula.  A nasogastric tube is been passed into the stomach. No pleural  effusion or pneumothorax.      Impression    IMPRESSION:  Nasogastric tube with its tip in the stomach      MURTAZA ECHOLS MD

## 2020-02-15 NOTE — PLAN OF CARE
[facility] Inpatient Admission Note:    Patient admitted to 3126/3126-1 at approximately 0115 via cart accompanied by nurse and daughter from emergency room . Report received from DAVID Rodríguez in SBAR format at 0050 via telephone. Patient transferred to bed via slide board.. Patient is alert and oriented X 0,Pt sedated Pt sedated ; rates at 0 on 0-10 scale.  Patient oriented to room, unit, hourly rounding, and plan of care. Explained admission packet and patient handbook with patient bill of rights brochure. Will continue to monitor and document as needed.     Inpatient Nursing criteria listed below was met:    Health care directives status obtained and documented: Yes    Care Everywhere authorization obtained No    MRSA swab completed for patient 65 years and older: Yes    Patient identifies a surrogate decision maker: No If yes, who: Contact Information:     If initial lactic acid >2.0, repeat lactic acid drawn within one hour of arrival to unit: NA. If no, state reason:     Vaccination assessment and education completed: Yes   Vaccinations received prior to admission: Pneumovax yes  Influenza(seasonal)  NO   Vaccination(s) ordered: not given today because pt sedated and unable to request    Clergy visit ordered if patient requests: Pt sedated and unable to answer    Skin issues/needs documented: Yes    Isolation Patient: yes Education given, correct sign in place and documentation row added to PCS:  Yes    Fall Prevention Yes: Care plan updated, education given and documented, sticker and magnet in place: Yes    Care Plan initiated: Yes    Education Documented (including assessment): Yes    Patient has discharge needs : TIA

## 2020-02-15 NOTE — PROGRESS NOTES
Patient remains intubated on mechanical ventilation. Settings on vent now AC 650vt 24 45% peep 5.  ABGs improving following increases in rate and tidal volume per telemedicine.  Breath sounds tight inspiratory and expiratory wheezes bilat.  Now able to suction thick yellow tan secretions.  spao2 95% continous albuterol given.

## 2020-02-16 ENCOUNTER — APPOINTMENT (OUTPATIENT)
Dept: GENERAL RADIOLOGY | Facility: HOSPITAL | Age: 55
DRG: 207 | End: 2020-02-16
Attending: INTERNAL MEDICINE
Payer: COMMERCIAL

## 2020-02-16 LAB
ALBUMIN SERPL-MCNC: 3 G/DL (ref 3.4–5)
ALP SERPL-CCNC: 54 U/L (ref 40–150)
ALT SERPL W P-5'-P-CCNC: 30 U/L (ref 0–70)
ANION GAP SERPL CALCULATED.3IONS-SCNC: 6 MMOL/L (ref 3–14)
AST SERPL W P-5'-P-CCNC: 26 U/L (ref 0–45)
BACTERIA SPEC CULT: NORMAL
BASE DEFICIT BLDA-SCNC: 2 MMOL/L
BASE DEFICIT BLDA-SCNC: 3.4 MMOL/L
BASOPHILS # BLD AUTO: 0 10E9/L (ref 0–0.2)
BASOPHILS NFR BLD AUTO: 0.1 %
BILIRUB SERPL-MCNC: 0.2 MG/DL (ref 0.2–1.3)
BUN SERPL-MCNC: 39 MG/DL (ref 7–30)
CALCIUM SERPL-MCNC: 7.8 MG/DL (ref 8.5–10.1)
CHLORIDE SERPL-SCNC: 109 MMOL/L (ref 94–109)
CK SERPL-CCNC: 180 U/L (ref 30–300)
CO2 SERPL-SCNC: 24 MMOL/L (ref 20–32)
CREAT SERPL-MCNC: 1.11 MG/DL (ref 0.66–1.25)
CRP SERPL-MCNC: 24.6 MG/L (ref 0–8)
DIFFERENTIAL METHOD BLD: ABNORMAL
EOSINOPHIL # BLD AUTO: 0 10E9/L (ref 0–0.7)
EOSINOPHIL NFR BLD AUTO: 0 %
ERYTHROCYTE [DISTWIDTH] IN BLOOD BY AUTOMATED COUNT: 14.6 % (ref 10–15)
EST. AVERAGE GLUCOSE BLD GHB EST-MCNC: 137 MG/DL
GFR SERPL CREATININE-BSD FRML MDRD: 74 ML/MIN/{1.73_M2}
GLUCOSE BLDC GLUCOMTR-MCNC: 149 MG/DL (ref 70–99)
GLUCOSE BLDC GLUCOMTR-MCNC: 155 MG/DL (ref 70–99)
GLUCOSE BLDC GLUCOMTR-MCNC: 157 MG/DL (ref 70–99)
GLUCOSE BLDC GLUCOMTR-MCNC: 166 MG/DL (ref 70–99)
GLUCOSE BLDC GLUCOMTR-MCNC: 180 MG/DL (ref 70–99)
GLUCOSE SERPL-MCNC: 171 MG/DL (ref 70–99)
HBA1C MFR BLD: 6.4 % (ref 0–5.6)
HCO3 BLD-SCNC: 24 MMOL/L (ref 21–28)
HCO3 BLD-SCNC: 25 MMOL/L (ref 21–28)
HCT VFR BLD AUTO: 38.6 % (ref 40–53)
HGB BLD-MCNC: 12.7 G/DL (ref 13.3–17.7)
IMM GRANULOCYTES # BLD: 0.1 10E9/L (ref 0–0.4)
IMM GRANULOCYTES NFR BLD: 0.6 %
LYMPHOCYTES # BLD AUTO: 0.7 10E9/L (ref 0.8–5.3)
LYMPHOCYTES NFR BLD AUTO: 5.7 %
MCH RBC QN AUTO: 32 PG (ref 26.5–33)
MCHC RBC AUTO-ENTMCNC: 32.9 G/DL (ref 31.5–36.5)
MCV RBC AUTO: 97 FL (ref 78–100)
MONOCYTES # BLD AUTO: 0.7 10E9/L (ref 0–1.3)
MONOCYTES NFR BLD AUTO: 5.5 %
NEUTROPHILS # BLD AUTO: 10.7 10E9/L (ref 1.6–8.3)
NEUTROPHILS NFR BLD AUTO: 88.1 %
NRBC # BLD AUTO: 0 10*3/UL
NRBC BLD AUTO-RTO: 0 /100
O2/TOTAL GAS SETTING VFR VENT: 40 %
O2/TOTAL GAS SETTING VFR VENT: ABNORMAL %
OXYHGB MFR BLD: 94 % (ref 92–100)
OXYHGB MFR BLD: 96 % (ref 92–100)
PCO2 BLD: 49 MM HG (ref 35–45)
PCO2 BLD: 49 MM HG (ref 35–45)
PH BLD: 7.29 PH (ref 7.35–7.45)
PH BLD: 7.31 PH (ref 7.35–7.45)
PLATELET # BLD AUTO: 167 10E9/L (ref 150–450)
PO2 BLD: 78 MM HG (ref 80–105)
PO2 BLD: 86 MM HG (ref 80–105)
POTASSIUM SERPL-SCNC: 5 MMOL/L (ref 3.4–5.3)
PROCALCITONIN SERPL-MCNC: 0.41 NG/ML
PROT SERPL-MCNC: 5.9 G/DL (ref 6.8–8.8)
RBC # BLD AUTO: 3.97 10E12/L (ref 4.4–5.9)
SODIUM SERPL-SCNC: 139 MMOL/L (ref 133–144)
SPECIMEN SOURCE: NORMAL
WBC # BLD AUTO: 12.2 10E9/L (ref 4–11)

## 2020-02-16 PROCEDURE — 94003 VENT MGMT INPAT SUBQ DAY: CPT

## 2020-02-16 PROCEDURE — 82550 ASSAY OF CK (CPK): CPT | Performed by: INTERNAL MEDICINE

## 2020-02-16 PROCEDURE — 25800030 ZZH RX IP 258 OP 636: Performed by: INTERNAL MEDICINE

## 2020-02-16 PROCEDURE — 86140 C-REACTIVE PROTEIN: CPT | Performed by: INTERNAL MEDICINE

## 2020-02-16 PROCEDURE — 36415 COLL VENOUS BLD VENIPUNCTURE: CPT | Performed by: INTERNAL MEDICINE

## 2020-02-16 PROCEDURE — 85025 COMPLETE CBC W/AUTO DIFF WBC: CPT | Performed by: INTERNAL MEDICINE

## 2020-02-16 PROCEDURE — 25000128 H RX IP 250 OP 636: Performed by: INTERNAL MEDICINE

## 2020-02-16 PROCEDURE — 40000275 ZZH STATISTIC RCP TIME EA 10 MIN

## 2020-02-16 PROCEDURE — 82805 BLOOD GASES W/O2 SATURATION: CPT | Performed by: INTERNAL MEDICINE

## 2020-02-16 PROCEDURE — 25000132 ZZH RX MED GY IP 250 OP 250 PS 637: Performed by: INTERNAL MEDICINE

## 2020-02-16 PROCEDURE — 00000146 ZZHCL STATISTIC GLUCOSE BY METER IP

## 2020-02-16 PROCEDURE — 83036 HEMOGLOBIN GLYCOSYLATED A1C: CPT | Performed by: INTERNAL MEDICINE

## 2020-02-16 PROCEDURE — 40000788 ZZHCL STATISTIC ESTIMATED AVERAGE GLUCOSE: Performed by: INTERNAL MEDICINE

## 2020-02-16 PROCEDURE — 80053 COMPREHEN METABOLIC PANEL: CPT | Performed by: INTERNAL MEDICINE

## 2020-02-16 PROCEDURE — 84145 PROCALCITONIN (PCT): CPT | Performed by: INTERNAL MEDICINE

## 2020-02-16 PROCEDURE — 36600 WITHDRAWAL OF ARTERIAL BLOOD: CPT

## 2020-02-16 PROCEDURE — 71045 X-RAY EXAM CHEST 1 VIEW: CPT | Mod: TC

## 2020-02-16 PROCEDURE — 25000131 ZZH RX MED GY IP 250 OP 636 PS 637: Performed by: INTERNAL MEDICINE

## 2020-02-16 PROCEDURE — 25000125 ZZHC RX 250

## 2020-02-16 PROCEDURE — 20000003 ZZH R&B ICU

## 2020-02-16 PROCEDURE — 99291 CRITICAL CARE FIRST HOUR: CPT | Performed by: INTERNAL MEDICINE

## 2020-02-16 PROCEDURE — 94640 AIRWAY INHALATION TREATMENT: CPT | Mod: 76

## 2020-02-16 RX ORDER — DEXTROSE MONOHYDRATE 25 G/50ML
25-50 INJECTION, SOLUTION INTRAVENOUS
Status: DISCONTINUED | OUTPATIENT
Start: 2020-02-16 | End: 2020-02-24

## 2020-02-16 RX ORDER — NICOTINE POLACRILEX 4 MG
15-30 LOZENGE BUCCAL
Status: DISCONTINUED | OUTPATIENT
Start: 2020-02-16 | End: 2020-02-24

## 2020-02-16 RX ADMIN — CEFTRIAXONE SODIUM 1 G: 1 INJECTION, SOLUTION INTRAVENOUS at 22:05

## 2020-02-16 RX ADMIN — PROPOFOL 50 MCG/KG/MIN: 10 INJECTION, EMULSION INTRAVENOUS at 12:07

## 2020-02-16 RX ADMIN — INSULIN ASPART 1 UNITS: 100 INJECTION, SOLUTION INTRAVENOUS; SUBCUTANEOUS at 16:32

## 2020-02-16 RX ADMIN — INSULIN ASPART 1 UNITS: 100 INJECTION, SOLUTION INTRAVENOUS; SUBCUTANEOUS at 20:12

## 2020-02-16 RX ADMIN — ALBUTEROL SULFATE 6 PUFF: 90 AEROSOL, METERED RESPIRATORY (INHALATION) at 00:34

## 2020-02-16 RX ADMIN — HYDROMORPHONE HYDROCHLORIDE 0.5 MG: 1 INJECTION, SOLUTION INTRAMUSCULAR; INTRAVENOUS; SUBCUTANEOUS at 14:37

## 2020-02-16 RX ADMIN — METHYLPREDNISOLONE SODIUM SUCCINATE 125 MG: 125 INJECTION, POWDER, FOR SOLUTION INTRAMUSCULAR; INTRAVENOUS at 08:19

## 2020-02-16 RX ADMIN — HYDROMORPHONE HYDROCHLORIDE 0.5 MG: 1 INJECTION, SOLUTION INTRAMUSCULAR; INTRAVENOUS; SUBCUTANEOUS at 02:52

## 2020-02-16 RX ADMIN — HYDROMORPHONE HYDROCHLORIDE 0.5 MG: 1 INJECTION, SOLUTION INTRAMUSCULAR; INTRAVENOUS; SUBCUTANEOUS at 08:19

## 2020-02-16 RX ADMIN — FAMOTIDINE 20 MG: 20 INJECTION, SOLUTION INTRAVENOUS at 08:19

## 2020-02-16 RX ADMIN — ALBUTEROL SULFATE 6 PUFF: 90 AEROSOL, METERED RESPIRATORY (INHALATION) at 22:22

## 2020-02-16 RX ADMIN — ALBUTEROL SULFATE 6 PUFF: 90 AEROSOL, METERED RESPIRATORY (INHALATION) at 18:32

## 2020-02-16 RX ADMIN — DOXYCYCLINE 100 MG: 100 INJECTION, POWDER, LYOPHILIZED, FOR SOLUTION INTRAVENOUS at 00:17

## 2020-02-16 RX ADMIN — OSELTAMIVIR PHOSPHATE 75 MG: 75 CAPSULE ORAL at 20:12

## 2020-02-16 RX ADMIN — PROPOFOL 75 MCG/KG/MIN: 10 INJECTION, EMULSION INTRAVENOUS at 23:00

## 2020-02-16 RX ADMIN — SODIUM CHLORIDE, POTASSIUM CHLORIDE, SODIUM LACTATE AND CALCIUM CHLORIDE: 600; 310; 30; 20 INJECTION, SOLUTION INTRAVENOUS at 02:56

## 2020-02-16 RX ADMIN — INSULIN ASPART 1 UNITS: 100 INJECTION, SOLUTION INTRAVENOUS; SUBCUTANEOUS at 12:28

## 2020-02-16 RX ADMIN — PROPOFOL 60 MCG/KG/MIN: 10 INJECTION, EMULSION INTRAVENOUS at 01:18

## 2020-02-16 RX ADMIN — CHLORHEXIDINE GLUCONATE ORAL RINSE 15 ML: 1.2 SOLUTION DENTAL at 20:11

## 2020-02-16 RX ADMIN — ENOXAPARIN SODIUM 90 MG: 100 INJECTION SUBCUTANEOUS at 18:04

## 2020-02-16 RX ADMIN — PROPOFOL 65 MCG/KG/MIN: 10 INJECTION, EMULSION INTRAVENOUS at 20:34

## 2020-02-16 RX ADMIN — ALBUTEROL SULFATE 6 PUFF: 90 AEROSOL, METERED RESPIRATORY (INHALATION) at 12:46

## 2020-02-16 RX ADMIN — METHYLPREDNISOLONE SODIUM SUCCINATE 125 MG: 125 INJECTION, POWDER, FOR SOLUTION INTRAMUSCULAR; INTRAVENOUS at 20:25

## 2020-02-16 RX ADMIN — INSULIN ASPART 1 UNITS: 100 INJECTION, SOLUTION INTRAVENOUS; SUBCUTANEOUS at 08:22

## 2020-02-16 RX ADMIN — CHLORHEXIDINE GLUCONATE ORAL RINSE 15 ML: 1.2 SOLUTION DENTAL at 08:18

## 2020-02-16 RX ADMIN — HYDROMORPHONE HYDROCHLORIDE 0.5 MG: 1 INJECTION, SOLUTION INTRAMUSCULAR; INTRAVENOUS; SUBCUTANEOUS at 12:35

## 2020-02-16 RX ADMIN — PROPOFOL 70 MCG/KG/MIN: 10 INJECTION, EMULSION INTRAVENOUS at 04:09

## 2020-02-16 RX ADMIN — ENOXAPARIN SODIUM 90 MG: 100 INJECTION SUBCUTANEOUS at 06:13

## 2020-02-16 RX ADMIN — METHYLPREDNISOLONE SODIUM SUCCINATE 125 MG: 125 INJECTION, POWDER, FOR SOLUTION INTRAMUSCULAR; INTRAVENOUS at 14:37

## 2020-02-16 RX ADMIN — PROPOFOL 60 MCG/KG/MIN: 10 INJECTION, EMULSION INTRAVENOUS at 18:03

## 2020-02-16 RX ADMIN — ALBUTEROL SULFATE 6 PUFF: 90 AEROSOL, METERED RESPIRATORY (INHALATION) at 09:35

## 2020-02-16 RX ADMIN — PROPOFOL 75 MCG/KG/MIN: 10 INJECTION, EMULSION INTRAVENOUS at 06:06

## 2020-02-16 RX ADMIN — OSELTAMIVIR PHOSPHATE 75 MG: 75 CAPSULE ORAL at 08:40

## 2020-02-16 RX ADMIN — FAMOTIDINE 20 MG: 20 INJECTION, SOLUTION INTRAVENOUS at 20:25

## 2020-02-16 RX ADMIN — METHYLPREDNISOLONE SODIUM SUCCINATE 125 MG: 125 INJECTION, POWDER, FOR SOLUTION INTRAMUSCULAR; INTRAVENOUS at 02:49

## 2020-02-16 RX ADMIN — ALBUTEROL SULFATE 6 PUFF: 90 AEROSOL, METERED RESPIRATORY (INHALATION) at 02:53

## 2020-02-16 RX ADMIN — PROPOFOL 70 MCG/KG/MIN: 10 INJECTION, EMULSION INTRAVENOUS at 08:36

## 2020-02-16 RX ADMIN — PROPOFOL 60 MCG/KG/MIN: 10 INJECTION, EMULSION INTRAVENOUS at 14:50

## 2020-02-16 RX ADMIN — ALBUTEROL SULFATE 6 PUFF: 90 AEROSOL, METERED RESPIRATORY (INHALATION) at 16:09

## 2020-02-16 RX ADMIN — HYDROMORPHONE HYDROCHLORIDE 0.5 MG: 1 INJECTION, SOLUTION INTRAMUSCULAR; INTRAVENOUS; SUBCUTANEOUS at 19:43

## 2020-02-16 RX ADMIN — SODIUM CHLORIDE, POTASSIUM CHLORIDE, SODIUM LACTATE AND CALCIUM CHLORIDE: 600; 310; 30; 20 INJECTION, SOLUTION INTRAVENOUS at 16:29

## 2020-02-16 ASSESSMENT — ACTIVITIES OF DAILY LIVING (ADL)
ADLS_ACUITY_SCORE: 19

## 2020-02-16 NOTE — PLAN OF CARE
Right wrist and Left wrist soft restraints continued 2/15/2020    Clinical Justification: Pulling lines, pulling tubes, and pulling equipment  Less Restrictive Alternative: 1:1 patient care, Repositioning  Attending Physician Notified: MD ordered restraint, Attending Physician's Name: Dr. Houston   New orders placed Yes  Length of Order: 1 Day      Kimberly Freitas RN

## 2020-02-16 NOTE — PLAN OF CARE
Spoke with wife regarding abscess on right thigh. Pt has had this for years per wife and has been self caring for.

## 2020-02-16 NOTE — PROGRESS NOTES
Sepsis Evaluation Progress Note    I was called to see Ry Man due to abnormal vital signs triggering the Sepsis SIRS screening alert. He is not known to have an infection.     Physical Exam   Vital Signs:  Temp: 98.6  F (37  C) Temp src: Tympanic BP: 117/78 Pulse: 57 Heart Rate: 59 Resp: 23 SpO2: 97 % O2 Device: Mechanical Ventilator      Lab:  Lactic Acid   Date Value Ref Range Status   02/14/2020 1.5 0.7 - 2.0 mmol/L Final     Lactate for Sepsis Protocol   Date Value Ref Range Status   02/15/2020 1.4 0.7 - 2.0 mmol/L Final       The patient is at baseline mental status.     The rest of their physical exam is significant for remains intubated, sedated. Appropriate response to sedation withdrawal  Arouses with weak eye openining during sedation withdrawal  HEENT: Pupils equal, conjugate. No icterus or nystagmus. Oral mucosa moist. No facial asymmetry.   Neck: Supple, jugular veins not elevated. Tracheal midline   Chest: No chest wall movement asymmetry. Aeration globally diminished. Accessory muscles not in use. Expiratory time not increased. Diffuse tidal wheezes. No rhonchi. No discrete crackles.   Cardiac: PMI not displaced. S1, S2 unremarkable. No S3, S4. P2 not accentuated. No murmurs.   Abdomen: Soft. No palpation or percussion tenderness. No distention. Normoactive bowel sounds. Liver and spleen not increased in size. No bruits, masses, or pulsations.   Extremities: No lower extremity edema. No eccymoses, clubbing.   Neurologic: Mental state above. Equal movement of all extremities. Tone preserved. No fasiculations or tremors.     Assessment & Plan   NO EVIDENCE OF SEPSIS at this time.  Vital sign, physical exam, and lab findings are likely due to critical illness; borderline leukocytosis.   Possible lower respiratory tract infection vs atelectasis. No change in clinical presentation compared to evalation earlier today.    Disposition: The patient will remain on the current unit. We will continue to  monitor this patient closely..  Pola Samson MD    Sepsis Criteria   Sepsis: 2+ SIRS criteria due to infection  Severe Sepsis: Sepsis AND 1+ new sign of acute organ dysfunction (Note: lactate >2 is organ dysfunction)  Septic Shock: Sepsis AND hypotension despite volume resuscitation with 30 ml/kg crystalloid

## 2020-02-16 NOTE — PLAN OF CARE
"Temp: 99.8  F (37.7  C) Temp src: Tympanic BP: 100/64 Pulse: 73 Heart Rate: 70 Resp: 18 SpO2: 94 % O2 Device: Mechanical Ventilator     Patient remains on vent moderately sedated; Assessment(s) as charted. Propofol continues to run between 50-60/hr. UO adequate; minimal secretions from vent. Turned/repositioned Q2 hours; tolerates well - gag reflex with stimulation. Abx to be started d/t gram positive cocci in sputum culture. No events/injuries this shift; bed low and locked, ID band on, directly outside of unit station; video monitored; frequent rounding.   Wife came to visit and called several times. Feels like she was not given all of the information and requested patient be transferred to [facility] because pulmonologist is available there. This writer went over plan of care, tx, and areas on concern within lungs, found on CT - reassured that there was no overt sign of anything else wrong and we would provide a referral. Patient wished to speak to MD. Dr. Grimaldo on scene - he went over the CT results with the patient - patient was upset about a cyst found on kidney; felt misinformed and that her  has been \"misdiagnosed 3 times from 3 separate doctors.\" MD, this writer, and Jennifer GIORDANO RN reiterated the importance of vent/airway support; risks of transfer, and encouraged the patient to consider her options. Patient wife upset with MD; but has decided to stay. Provided education on medications, treatments, lines, and plan of tx.     Face to face report given with opportunity to observe patient.    Report given to Suzie RN's    Kimberly Freitas RN   2/15/2020  7:44 PM        "

## 2020-02-16 NOTE — PLAN OF CARE
SEDATION VACATION     Patient on ventilator with sedation.     Is Patient a candidate for sedation vacation Yes   If no, reason why not completed:NA    Daily sedation interruption completed. Yes   Time of sedation interruption [id number]  How patient tolerated interruption: Patient titrated down to 5mg Prop; patient increasingly aroused; BP elevated (170/100's) pulse went from 60-80's; bit at tube, opened eyes, but not to command, unable to follow commands,turned head towards speaker; spontaneous movement of upper extremities. RT @ bedside, Lizbeth Rodriguez RN's, MD present and instructed to re-sedate patient.

## 2020-02-16 NOTE — PLAN OF CARE
Right wrist, Left wrist and soft restraints restraints continued 2/16/2020    Clinical Justification: Pulling lines, pulling tubes, and pulling equipment  Less Restrictive Alternative: 1:1 patient care, Repositioning  Attending Physician Notified: MD ordered restraint, Attending Physician's Name: Dr. Houston   New orders placed Yes  Length of Order: 1 Day      Kimberly Freitas RN

## 2020-02-16 NOTE — PROGRESS NOTES
Assessment completed see flow sheet.    LOC: alert   Others present: spoke with spouse, Elisha to complete assessment, Ry is intubated.    Dx: Respiratory Failure  Chronic Disease Management: HTN    Lives with: spouse Elisha  Living at:  Home in [place]  Transportation: YES, they both drive    Primary PCP: will need to establish care at [facility]  Insurance:  BCBS  Medicare NO letter reviewed with Elisha.    Support System:  spouse  Homecare/PCA: no  /County Services:   no  : YES      How was the [facility] notification completed: yes    Health Care Directive: NO   Guardian: no  POA: no    Pharmacy: [facility]  Meds management: YES, manages own    Adequate Resources for needs (housing, utilities, food/med): YES  Household chores: shared  Work/community/social activity: YES, works at [employer] and gets out socially as desired. He enjoys fishing and hunting    ADLs: independent  Ambulation:independent  Falls: no  Nutrition: no  Sleep: sleeps well works shift work schedule    Equipment used: no      Oxygen supplier: no      Does the supplier have valid oxygen orders: n/a    Mental health: no  Substance abuse: no  Exposure to violence/abuse: no  Stressors: not asked    Able to Return to Prior Living Arrangements: YES    Choice of Vendor: n/a    Barriers: unknown    TRINIDAD: low    Plan: unknown, may need SNF for rehab

## 2020-02-16 NOTE — PROGRESS NOTES
"[facility]    Hospitalist Progress Note    Date of Service (when I saw the patient): 02/16/2020    Assessment & Plan   Ry Man is a 54 year old  man who was admitted on 2/14/2020. This is a 54-year-old man who presented twice to emergency department.  On first evaluation rapid screen showed influenza.  He apparently was otherwise compensated although remained tachypneic and was discharged from emergency department treated with oseltamivir and prednisone.  He returned with increased dyspnea and hypertension.  Arterial blood gas analysis showed combined respiratory metabolic acidosis (ABG 7.4/37 on first visit) he showed moderate leukocytosis.  Examination showed significant bronchospasm with diffuse wheezing.  Taken to CT for CT of the chest \"angiogram\" and became unresponsive on transport back to emergency department.  Emergently intubated in the emergency department.  Since then oxygenation has been adequate on relatively low FiO2.   Lactate has never been elevated.    E 86     Prior records from outpatient reviewed.  The patient does carry a history of COPD with at least several exacerbations during 2019.  Pulmonary function testing indicated on outpatient records although I do not find direct records of pulmonary function testing in the [facility] or our records.  FEV1/FVC 50%.  FEV1 33%.   DLCO 42% predicted.  % predicted with % predicted.  Reversibility was indicated.  The patient continues to smoke cigarettes with a 70-pack-year history.     1.    Mechanical ventilatory support  Acute hypercapnic respiratory failure  Gas exchange improved.  Still hypercapnic with moderate minute ventilation demonstrating hypercapnic respiratory failure however CO2 clearance stable with minute ventilation decreased by several liters per minute.  Mean airway pressure 16.  Peak pressures are in a range of 34-36 however plateau pressures approximately 30 showing persistent significant airways resistance. "  Excellent oxygenation with relatively low FiO2.  No great benefit to decreasing this below 40% although in general would prefer to avoid hyperoxia.  On examination he still has significant decreased aeration however improved.  Beginning to have more secretion clearance.  As below doubt lower respiratory tract infection although continuing preemptive treatment.  Continue vigorous bronchodilation as well as suctioning with lavage to help clear secretions which likely represent improving small airway caliber.  Continue relatively high dose of intravenous steroids.  Permissive hypercapnia.  Triggering remains an issue.  No great benefit from increasing PEEP as high as 16 cm during pressure volume investigation at bedside.  Will continue PEEP at 10.  With improved but still persistent airflow obstruction, and prominent secretions would not consider spontaneous breathing trial or extubation today.  However extubation in the next 24-48 hours likely can be contemplated.    Overall course is been most consistent with prominent airflow obstruction, likely COPD with significant bronchospasm.  He is required multiple alterations in mechanical ventilatory support but at this point continuing him mildly elevated tidal volume of 0.65 L and minimizing respiratory frequency with sedation/analgesia seems the best approach. Overall, however, gas exchange and metabolic acidosis are improving.  Repeat arterial blood gas analysis later this afternoon.     2.  Influenza infection  Likely the prominent cause of his bronchospasm/increased airflow obstruction.  As below likely poorly controlled COPD with airway reactivity.  Consider oseltamivir at treatment doses.  3.  Possible lower respiratory tract infection  Sputum Gram stain does show a few gram-positive cocci.  Suspect this is colonization.  Procalcitonin mildly elevated but not to the extent that would mandate antibiotics.  Overall radiographic appearance is more suggestive of  "atelectasis and in fact larger nodular area of decreased attenuation in inferior lingula has some characteristics suggesting rounded atelectasis.  Will attempt to obtain direct images of CT done in 2016 at [facility] for comparison.  Certainly appearance is not suggestive of \"atypical\" organisms.  Discontinue doxycycline.  For the time being continuing ceftriaxone with a planned treatment course of 3-5 days may not be unreasonable.  He does have what appears to be history of firm colitis although he is avoided medical care for several superficial abscesses which he has had.  This could increase his risk of a staphylococcal infection although radiographic appearance quite atypical for this.  Preemptive treatment for staph species does not seem warranted.  3.  COPD with airway reactivity  As above overall course of most consistent with significant airflow obstruction.  He has not had frequent encounters with medical care however reviewing available records at [facility] it does appear that this is most consistent with COPD rather than asthma per se.  He is also had issues with sinusitis.  Not certain whether symptomatically this is been present more recently.  This could give him an increased risk of airway reactivity.  Pulmonary function studies from 2017 as detailed above.  This demonstrates airflow obstruction as well as airway reactivity.  Air trapping without significant hyperinflation.  Unfortunately I do not have direct tracings of this available.  Treatment as above including bronchodilators and steroids.  Dr. Garland note does indicate that he has had, at least as of last year when he was last evaluated as an outpatient, frequent exacerbations.  We will discuss this further with the patient when he is able to be more interactive.  4.  Tobacco use disorder  At least a 70-pack-year history of tobacco abuse.  Nicotine replacement when this is indicated.  Will discuss with the patient when he is able to be more " interactive tobacco cessation.  5.  Possible pulmonary embolism  CT from initial evaluation while technically limited study did suggest thromboembolic disease.  Currently excessively high risk for repeat CT imaging.  Treat empirically for pulmonary embolism with enoxaparin every 12 hours.    For the time being continue preemptive treatment.  Renal function is improved but likely not at his baseline and contrast dose  repeat a contrast load probably carries more risk than benefit.  Depending on his course could still consider repeat CT in another 24-36 hours.  However may be necessary to commit him to at least 2-3 months of anticoagulation.  May be most reasonable to regard this as a provoked pulmonary embolism if in fact one is present.  6.  Lung nodule  As above attempt to obtain direct images from [facility].  CT 12/12/2016 was obtained by Dr. Garland.  Appears the patient had a plain radiograph initially which demonstrated some nodular findings, possibly requested in the context of the patient's smoking history and dyspnea.  The patient was told, based on [facility] records, that the CT requested subsequently was suggestive of atelectasis and that a follow-up study would be indicated.  Appears this may have been delayed because of financial issues.  Report of the 12/12/2016 CT indicates nodule suggestive of atelectasis in left upper lobe abutting the major fissure.  History indicates a subcentimeter nodule.  Dimensions are not noted in CT report.  Current study shows what is likely to be a similar appearing nodular area of decreased attenuation at the major fissure.  It is most prominent in the inferior lingula abutting the heart border with what are likely to be continuous nodular areas of decreased attenuation more cephalad in the right upper lobe.  Repeated plain radiographs over the course of the last several days does show some variability in the appearance of this area of nodular infiltrate, again suggestive  "of compressive atelectasis.  In the context of his significant secretions he is at risk of mucoid impaction.  Certainly follow-up in the near future as was recommended previously would be indicated.    CT from 2016 \"pushed\" to our PACS today.   stable renal cyst.  Appearance of the nodular area of decreased attenuation following the course of the major fissure mid lung beginning in upper lobe somewhat more prominent perhaps in part because of contrast.  Nodular area abutting left heart border in the lingula somewhat larger on the more recent study performed here.  Particularly 2016 study does have a suggestion of rounded atelectasis although does not meet all the criteria for this.  Reviewed [facility] records.  Patient was, at least based on those records, informed of this study.  Repeat was limited in part by financial concerns because of the expense of his initial CT.  Unfortunately appears regular medical follow-up was difficult for him beginning shortly after that.  7.  Nutritional support  Suspect he is going to require at least several days of mechanical ventilatory support.    Have initiated enteral feeds.  Slowly increase rate.  8.  Hyperglycemia of acute illness  Glucose levels have been persistently in a range of 140-160.  Not unexpected given critical illness as well as steroid therapy.  Add short acting sliding scale insulin.  Hemoglobin A1c 6.4, not markedly elevated.  Persistent hyperglycemia or zachery diabetes unlikely.  Glucose is likely to increase with initiation of enteral feeds.  9.  Hyperkalemia  Potassium now at the upper limits of expected.  Transient hyperkalemia earlier in the day today.  Multifactorial related in part to frequent intermittent beta agonist.  CK not elevated.    This patient remains critically ill at substantial risk of life-threatening deterioration. I have been present at the bedside on multiple occasions for serial evaluation as well as directing ongoing therapy including " intensive mechanical ventilatory support.  Cumulative critical care time today in evaluation and management exclusive of procedures  55 minutes.    Active Problems:    Influenza A    Acute respiratory failure with hypoxia (H)    Benign essential hypertension    Respiratory failure (H)       DVT Prophylaxis: Full dose anticoagulation with enoxaparin  Code Status: Full Code    Disposition: Expected discharge difficult to predict.  Minimum of several further days of critical care management in ICU.  Pola Samson    Interval History   Arouses with some agitation to vigorous tactile stimulation.  Withdrawal in flexor and extensor groups.  Appears comfortable when not stimulated.    -Data reviewed today: I reviewed all new labs and imaging results over the last 24 hours. I personally reviewed CT of chest obtained here last night with comparison of report in [facility] several years ago.  Results as above.  Multiple chest radiographs show tracheal tube slightly high in trachea with gastric tube in gastric fundus.  No focal infiltrate with exception of somewhat variable nodular density in inferior lingula abutting heart border.  2016  CT study pushed to our PACS.  Findings as detailed above.    Peripheral IV 02/15/20 Right;Posterior Lower forearm (Active)   Site Assessment [facility] 2/16/2020  8:00 AM   Line Status Infusing 2/16/2020  8:00 AM   Phlebitis Scale 0-->no symptoms 2/16/2020  8:00 AM   Infiltration Scale 0 2/16/2020  8:00 AM   Number of days: 1       Peripheral IV 02/16/20 Left Lower forearm (Active)   Site Assessment [facility] 2/16/2020  8:00 AM   Line Status Infusing 2/16/2020  8:00 AM   Phlebitis Scale 0-->no symptoms 2/16/2020  8:00 AM   Infiltration Scale 0 2/16/2020  8:00 AM   Number of days: 0       ETT 8 (Active)   Secured By Commercial tube pérez 2/16/2020  7:21 AM   Site Appearance Clean and dry 2/16/2020  7:21 AM   Secured at (cm) to lip 25 cm 2/16/2020  7:21 AM   Site of ET Tube Securement At teeth 2/16/2020   7:21 AM   Cuff Pressure - cm H2O 26 cmH20 2/15/2020  3:39 PM   Tube Care/Reposition repositioned tube left side of mouth 2/16/2020  7:21 AM   Bite Block Left 2/16/2020  7:21 AM   Safety Measures manual resuscitator at bedside 2/16/2020  7:21 AM   Number of days: 2       NG/OG/NJ Tube Orogastric Center mouth (Active)   Site Description WDL 2/16/2020  4:00 AM   Status Enteral Feedings 2/16/2020  4:00 AM   Drainage Appearance Brown 2/15/2020  7:57 PM   Placement Confirmation [place] unchanged 2/16/2020  4:00 AM   [place] (cm marking) at nare/mouth 58 cm 2/16/2020  4:00 AM   Intake (ml) 146 ml 2/16/2020  6:00 AM   Residual (mL) 0 mL 2/16/2020  4:00 AM   Output (ml) 150 ml 2/15/2020  6:00 PM   Number of days: 1       Urethral Catheter Non-latex 16 fr (Active)   Tube Description Positional 2/16/2020  4:00 AM   Catheter Care Done 2/16/2020  4:00 AM   Collection Container Standard 2/16/2020  4:00 AM   Securement Method Securing device (Describe) 2/16/2020  4:00 AM   Rationale for Continued Use Strict 1-2 Hour I&O 2/16/2020  4:00 AM   Urine Output 200 mL 2/16/2020  6:00 AM   Number of days: 2     Line/device assessment(s) completed for medical necessity February 16    Physical Exam   Temp: 98.6  F (37  C) Temp src: Tympanic BP: 112/73 Pulse: 57 Heart Rate: 58 Resp: 17 SpO2: 93 % O2 Device: Mechanical Ventilator    Vitals:    02/15/20 0400 02/16/20 0810   Weight: 92.5 kg (203 lb 14.8 oz) 95.1 kg (209 lb 10.5 oz)     Vital Signs with Ranges  Temp:  [98.6  F (37  C)-100.9  F (38.3  C)] 98.6  F (37  C)  Pulse:  [56-73] 57  Heart Rate:  [56-73] 58  Resp:  [13-34] 17  BP: ()/(50-78) 112/73  FiO2 (%):  [40 %] 40 %  SpO2:  [92 %-96 %] 93 %  I/O last 3 completed shifts:  In: 2850 [I.V.:2704; NG/GT:146]  Out: 1625 [Urine:1475; Emesis/NG output:150]    Arousable with mild agitation.  HEENT: Pupils equal, conjugate.  Myotic pupils briskly reactive to light.  No icterus or nystagmus. Oral mucosa moist. No facial asymmetry.    Neck: Supple, jugular veins not elevated. Trachea midline   Chest: No chest wall movement asymmetry. Aeration diminished but improved over the past 24 hours. Accessory muscles not in use.  Increased diffuse expiratory tidal wheezing in all lung fields few scattered sonorous rhonchi. No discrete crackles.   Cardiac: PMI not displaced. S1, S2 unremarkable. No S3, S4. P2 not accentuated. No murmurs.    Abdomen: Soft. No palpation or percussion tenderness. No distention. Normoactive bowel sounds. Liver and spleen not increased in size. No bruits, masses, or pulsations.   Extremities: No lower extremity edema.  Warm distally.  Brisk capillary refill.  No eccymoses.   Neurologic: Mental state above. Motor 5/5 and bilaterally equal. Tone preserved. No fasiculations or tremors. DTR 1-2/4 and bilaterally equal.     Medications     dextrose       lactated ringers 75 mL/hr at 02/16/20 0723     propofol (DIPRIVAN) infusion 70 mcg/kg/min (02/16/20 0735)       cefTRIAXone  1 g Intravenous Q24H     chlorhexidine  15 mL Mouth/Throat Q12H     enoxaparin ANTICOAGULANT  1 mg/kg Subcutaneous Q12H     famotidine  20 mg Intravenous Q12H     HYDROmorphone  0.5 mg Intravenous Q6H     insulin aspart  1-6 Units Subcutaneous Q4H     methylPREDNISolone  125 mg Intravenous Q6H     oseltamivir  75 mg Oral BID           Data   Recent Labs   Lab 02/16/20  0437 02/15/20  0950 02/15/20  0526 02/14/20  2106 02/14/20  0511   WBC 12.2*  --  15.4* 18.9* 14.0*   HGB 12.7*  --  14.2 16.3 16.3   MCV 97  --  98 94 93     --  183 233 234    138 138 136 139   POTASSIUM 5.0 5.2 6.2* 4.7 4.2   CHLORIDE 109 110* 110* 104 108   CO2 24 22 22 24 25   BUN 39* 36* 32* 27 17   CR 1.11 1.17 1.05 0.87 0.89   ANIONGAP 6 6 6 8 6   CARLTON 7.8* 8.0* 7.9* 9.2 8.7   * 155* 149* 184* 111*   ALBUMIN 3.0*  --  3.6  --  4.4   PROTTOTAL 5.9*  --  6.8  --  8.3   BILITOTAL 0.2  --  0.3  --  0.4   ALKPHOS 54  --  77  --  94   ALT 30  --  34  --  28   AST 26  --  25   --  22   TROPI  --   --   --  <0.015 <0.015     Recent Labs   Lab 02/16/20  0525 02/15/20  1512 02/15/20  0754 02/15/20  0556   PH 7.29* 7.30* 7.29* 7.25*   PCO2 49* 46* 47* 52*   PO2 78* 78* 66* 89   HCO3 24 23 22 23   O2PER 40 40% 40% 45       Lactic Acid   Date Value Ref Range Status   02/14/2020 1.5 0.7 - 2.0 mmol/L Final   02/14/2020 1.5 0.7 - 2.0 mmol/L Final       Recent Results (from the past 24 hour(s))   XR Chest Port 1 View    Narrative    PROCEDURE:  XR CHEST PORT 1 VW    HISTORY:  gastric tube placement; intubated.     COMPARISON:  None.    FINDINGS:   The cardiac silhouette is normal in size. The pulmonary vasculature is  normal.  There is persistent area of increased density in the lingula.  A nasogastric tube is been passed into the stomach. No pleural  effusion or pneumothorax.      Impression    IMPRESSION:  Nasogastric tube with its tip in the stomach      MURTAZA ECHOLS MD   XR Chest Port 1 View    Narrative    PROCEDURE:  XR CHEST PORT 1 VW    HISTORY:  respiratory failure, intubated.     COMPARISON:  None.    FINDINGS:   The cardiac silhouette is normal in size. The pulmonary vasculature is  normal.  There is some increased density persist seen in the lingula  without change. There is an endotracheal tube is tip approximately 7  centimeters above the nahomy. There is a nasogastric tube passing in  the stomach. No pleural effusion or pneumothorax.      Impression    IMPRESSION:  No change from February 15, 2020      MURTAZA ECHOLS MD

## 2020-02-16 NOTE — PROGRESS NOTES
Changed vent circuit and Anchorfast tube pérez without diff.  ETTsecure at 27 at lip.  ETT advanced 2 cm  earlier per MD.  No weantrial per MD.

## 2020-02-16 NOTE — PROGRESS NOTES
"Received consult regarding tube feeding.  54 yom admitted with influenza A, hx COPD.  Pt is currently intubated.    Ht-6'1.5\", Wt-210#.  IBWR is 168-206#.  BMI is 27.   No weight change noted.    Labs/meds reviewed.  Pt is on Solumedrol with sliding scale Novolog ordered.  Glucose levels 144-180.  A1c 6.4%.    Pt is receiving Propofol for sedation.  Current rate is 38.9 ml/hr.  Propofol in lipid suspension is providing 1027 calories.    Estimated nutritional needs are:  2880 calories (30 kcal/kg max ibw), 112-140 gm protein (1.2-1.5 gm/kg max ibw).    Pt does not meet criteria for malnutrition.    Pt is currently NPO.  Jevity 1.5 ordered (Jevity 1.2 is being substituted).   Current rate is 40 ml/hr which is providing 1152 calories, 53 gm protein, 775 ml fluid.  Total calories with TF and propofol is 2179 calories.    Suggest change formula to Oxepa which is more appropriate for intubated patients.    Rate can be titrated as Propofol is weaned.  Goal rate without Propofol is 80 ml/hr which would provide 2880 calories, 120 gm protein.    RD will follow medical plan.    "

## 2020-02-16 NOTE — PLAN OF CARE
Right wrist, Left wrist and soft restraints restraints continued 2/16/2020    Clinical Justification: Pulling lines, pulling tubes, and pulling equipment  Less Restrictive Alternative: 1:1 patient care, Repositioning  Attending Physician Notified: MD ordered restraint, Attending Physician's Name: Dr. Samson   New orders placed No, valid order.  Length of Order: 1 Day      Estefany Grant RN

## 2020-02-16 NOTE — PLAN OF CARE
Pt remains vented. Rate 12. Tv 650, PEEP 10, FiO2 40%. HRR SR. Lungs slight expiratory wheeze in upper lobes, diminished in lower bases. Lung aeration improved throughout shift. Prop. Increased with repositioning and suctioning d/t patient having increased activity.  Bowel sounds active receiving tube feeding 20ml/hr tolerating well with no residual. Adequate urine output. Continue to monitor.      Face to face report given with opportunity to observe patient.    Report given to Tarsha Grant RN   2/16/2020  7:20 AM

## 2020-02-16 NOTE — PLAN OF CARE
Talked with pts wife when she returned to room last night after situation where she was uneasy about cares. Reassured her that her  was in good hands and explained everything that was going on. Wife and daughter both seemed to understand and pts wife became more calm and understanding.

## 2020-02-16 NOTE — PLAN OF CARE
Started tube feeding at 2050 at 10ml/hr goal rate of 20ml/hr. Will increase with next glucose check in 4hrs.    Feeding increased to goal rate of 20 ml/hr at 0100.

## 2020-02-17 ENCOUNTER — APPOINTMENT (OUTPATIENT)
Dept: WOUND CARE | Facility: HOSPITAL | Age: 55
DRG: 207 | End: 2020-02-17
Attending: CLINICAL NURSE SPECIALIST
Payer: COMMERCIAL

## 2020-02-17 ENCOUNTER — APPOINTMENT (OUTPATIENT)
Dept: CT IMAGING | Facility: HOSPITAL | Age: 55
DRG: 207 | End: 2020-02-17
Attending: INTERNAL MEDICINE
Payer: COMMERCIAL

## 2020-02-17 ENCOUNTER — APPOINTMENT (OUTPATIENT)
Dept: GENERAL RADIOLOGY | Facility: HOSPITAL | Age: 55
DRG: 207 | End: 2020-02-17
Attending: INTERNAL MEDICINE
Payer: COMMERCIAL

## 2020-02-17 LAB
ALBUMIN SERPL-MCNC: 2.7 G/DL (ref 3.4–5)
ALP SERPL-CCNC: 47 U/L (ref 40–150)
ALT SERPL W P-5'-P-CCNC: 26 U/L (ref 0–70)
ANION GAP SERPL CALCULATED.3IONS-SCNC: 1 MMOL/L (ref 3–14)
ANION GAP SERPL CALCULATED.3IONS-SCNC: 6 MMOL/L (ref 3–14)
AST SERPL W P-5'-P-CCNC: 19 U/L (ref 0–45)
BASE DEFICIT BLDA-SCNC: 1.5 MMOL/L
BASE EXCESS BLDA CALC-SCNC: 0.6 MMOL/L
BASOPHILS # BLD AUTO: 0 10E9/L (ref 0–0.2)
BASOPHILS NFR BLD AUTO: 0.1 %
BILIRUB SERPL-MCNC: 0.2 MG/DL (ref 0.2–1.3)
BUN SERPL-MCNC: 32 MG/DL (ref 7–30)
BUN SERPL-MCNC: 32 MG/DL (ref 7–30)
CALCIUM SERPL-MCNC: 8.2 MG/DL (ref 8.5–10.1)
CALCIUM SERPL-MCNC: 8.2 MG/DL (ref 8.5–10.1)
CHLORIDE SERPL-SCNC: 111 MMOL/L (ref 94–109)
CHLORIDE SERPL-SCNC: 114 MMOL/L (ref 94–109)
CO2 SERPL-SCNC: 24 MMOL/L (ref 20–32)
CO2 SERPL-SCNC: 27 MMOL/L (ref 20–32)
CREAT SERPL-MCNC: 0.86 MG/DL (ref 0.66–1.25)
CREAT SERPL-MCNC: 0.88 MG/DL (ref 0.66–1.25)
CRP SERPL-MCNC: 36.4 MG/L (ref 0–8)
DIFFERENTIAL METHOD BLD: ABNORMAL
EOSINOPHIL # BLD AUTO: 0 10E9/L (ref 0–0.7)
EOSINOPHIL NFR BLD AUTO: 0 %
ERYTHROCYTE [DISTWIDTH] IN BLOOD BY AUTOMATED COUNT: 14.6 % (ref 10–15)
ERYTHROCYTE [DISTWIDTH] IN BLOOD BY AUTOMATED COUNT: 14.6 % (ref 10–15)
GFR SERPL CREATININE-BSD FRML MDRD: >90 ML/MIN/{1.73_M2}
GFR SERPL CREATININE-BSD FRML MDRD: >90 ML/MIN/{1.73_M2}
GLUCOSE BLDC GLUCOMTR-MCNC: 145 MG/DL (ref 70–99)
GLUCOSE BLDC GLUCOMTR-MCNC: 160 MG/DL (ref 70–99)
GLUCOSE BLDC GLUCOMTR-MCNC: 162 MG/DL (ref 70–99)
GLUCOSE BLDC GLUCOMTR-MCNC: 175 MG/DL (ref 70–99)
GLUCOSE BLDC GLUCOMTR-MCNC: 176 MG/DL (ref 70–99)
GLUCOSE SERPL-MCNC: 172 MG/DL (ref 70–99)
GLUCOSE SERPL-MCNC: 203 MG/DL (ref 70–99)
HCO3 BLD-SCNC: 26 MMOL/L (ref 21–28)
HCO3 BLD-SCNC: 27 MMOL/L (ref 21–28)
HCT VFR BLD AUTO: 39.9 % (ref 40–53)
HCT VFR BLD AUTO: 41.8 % (ref 40–53)
HGB BLD-MCNC: 13 G/DL (ref 13.3–17.7)
HGB BLD-MCNC: 13.4 G/DL (ref 13.3–17.7)
IMM GRANULOCYTES # BLD: 0.2 10E9/L (ref 0–0.4)
IMM GRANULOCYTES NFR BLD: 0.7 %
LYMPHOCYTES # BLD AUTO: 0.7 10E9/L (ref 0.8–5.3)
LYMPHOCYTES NFR BLD AUTO: 3.4 %
MAGNESIUM SERPL-MCNC: 2.6 MG/DL (ref 1.6–2.3)
MCH RBC QN AUTO: 31.5 PG (ref 26.5–33)
MCH RBC QN AUTO: 31.8 PG (ref 26.5–33)
MCHC RBC AUTO-ENTMCNC: 32.1 G/DL (ref 31.5–36.5)
MCHC RBC AUTO-ENTMCNC: 32.6 G/DL (ref 31.5–36.5)
MCV RBC AUTO: 98 FL (ref 78–100)
MCV RBC AUTO: 98 FL (ref 78–100)
MONOCYTES # BLD AUTO: 0.7 10E9/L (ref 0–1.3)
MONOCYTES NFR BLD AUTO: 3.3 %
NEUTROPHILS # BLD AUTO: 18.9 10E9/L (ref 1.6–8.3)
NEUTROPHILS NFR BLD AUTO: 92.5 %
NRBC # BLD AUTO: 0 10*3/UL
NRBC BLD AUTO-RTO: 0 /100
O2/TOTAL GAS SETTING VFR VENT: ABNORMAL %
O2/TOTAL GAS SETTING VFR VENT: ABNORMAL %
OXYHGB MFR BLD: 92 % (ref 92–100)
OXYHGB MFR BLD: 92 % (ref 92–100)
PCO2 BLD: 52 MM HG (ref 35–45)
PCO2 BLD: 52 MM HG (ref 35–45)
PH BLD: 7.3 PH (ref 7.35–7.45)
PH BLD: 7.33 PH (ref 7.35–7.45)
PHOSPHATE SERPL-MCNC: 3.3 MG/DL (ref 2.5–4.5)
PLATELET # BLD AUTO: 165 10E9/L (ref 150–450)
PLATELET # BLD AUTO: 169 10E9/L (ref 150–450)
PO2 BLD: 68 MM HG (ref 80–105)
PO2 BLD: 69 MM HG (ref 80–105)
POTASSIUM SERPL-SCNC: 4.8 MMOL/L (ref 3.4–5.3)
POTASSIUM SERPL-SCNC: 5.1 MMOL/L (ref 3.4–5.3)
PROCALCITONIN SERPL-MCNC: 0.12 NG/ML
PROT SERPL-MCNC: 5.9 G/DL (ref 6.8–8.8)
RBC # BLD AUTO: 4.09 10E12/L (ref 4.4–5.9)
RBC # BLD AUTO: 4.26 10E12/L (ref 4.4–5.9)
SODIUM SERPL-SCNC: 141 MMOL/L (ref 133–144)
SODIUM SERPL-SCNC: 142 MMOL/L (ref 133–144)
WBC # BLD AUTO: 17.5 10E9/L (ref 4–11)
WBC # BLD AUTO: 20.4 10E9/L (ref 4–11)

## 2020-02-17 PROCEDURE — 25000128 H RX IP 250 OP 636: Performed by: INTERNAL MEDICINE

## 2020-02-17 PROCEDURE — 83735 ASSAY OF MAGNESIUM: CPT | Performed by: INTERNAL MEDICINE

## 2020-02-17 PROCEDURE — 25000125 ZZHC RX 250: Performed by: INTERNAL MEDICINE

## 2020-02-17 PROCEDURE — 84100 ASSAY OF PHOSPHORUS: CPT | Performed by: INTERNAL MEDICINE

## 2020-02-17 PROCEDURE — 85027 COMPLETE CBC AUTOMATED: CPT | Performed by: INTERNAL MEDICINE

## 2020-02-17 PROCEDURE — 25800030 ZZH RX IP 258 OP 636: Performed by: INTERNAL MEDICINE

## 2020-02-17 PROCEDURE — 25000132 ZZH RX MED GY IP 250 OP 250 PS 637: Performed by: INTERNAL MEDICINE

## 2020-02-17 PROCEDURE — 36415 COLL VENOUS BLD VENIPUNCTURE: CPT | Performed by: INTERNAL MEDICINE

## 2020-02-17 PROCEDURE — 80048 BASIC METABOLIC PNL TOTAL CA: CPT | Performed by: INTERNAL MEDICINE

## 2020-02-17 PROCEDURE — 71045 X-RAY EXAM CHEST 1 VIEW: CPT | Mod: TC

## 2020-02-17 PROCEDURE — 80053 COMPREHEN METABOLIC PANEL: CPT | Performed by: INTERNAL MEDICINE

## 2020-02-17 PROCEDURE — 94640 AIRWAY INHALATION TREATMENT: CPT | Mod: 76

## 2020-02-17 PROCEDURE — 40000275 ZZH STATISTIC RCP TIME EA 10 MIN

## 2020-02-17 PROCEDURE — 84145 PROCALCITONIN (PCT): CPT | Performed by: INTERNAL MEDICINE

## 2020-02-17 PROCEDURE — 00000146 ZZHCL STATISTIC GLUCOSE BY METER IP

## 2020-02-17 PROCEDURE — 71250 CT THORAX DX C-: CPT | Mod: TC

## 2020-02-17 PROCEDURE — 82805 BLOOD GASES W/O2 SATURATION: CPT | Performed by: INTERNAL MEDICINE

## 2020-02-17 PROCEDURE — 94640 AIRWAY INHALATION TREATMENT: CPT

## 2020-02-17 PROCEDURE — 99233 SBSQ HOSP IP/OBS HIGH 50: CPT | Performed by: INTERNAL MEDICINE

## 2020-02-17 PROCEDURE — 20000003 ZZH R&B ICU

## 2020-02-17 PROCEDURE — 25000128 H RX IP 250 OP 636

## 2020-02-17 PROCEDURE — 94003 VENT MGMT INPAT SUBQ DAY: CPT

## 2020-02-17 PROCEDURE — 36600 WITHDRAWAL OF ARTERIAL BLOOD: CPT

## 2020-02-17 PROCEDURE — 85025 COMPLETE CBC W/AUTO DIFF WBC: CPT | Performed by: INTERNAL MEDICINE

## 2020-02-17 PROCEDURE — 86140 C-REACTIVE PROTEIN: CPT | Performed by: INTERNAL MEDICINE

## 2020-02-17 RX ORDER — FENTANYL CITRATE 50 UG/ML
50 INJECTION, SOLUTION INTRAMUSCULAR; INTRAVENOUS
Status: DISCONTINUED | OUTPATIENT
Start: 2020-02-17 | End: 2020-02-24

## 2020-02-17 RX ORDER — MULTIPLE VITAMINS W/ MINERALS TAB 9MG-400MCG
1 TAB ORAL DAILY
COMMUNITY
Start: 2014-03-04 | End: 2024-07-18

## 2020-02-17 RX ORDER — IPRATROPIUM BROMIDE AND ALBUTEROL SULFATE 2.5; .5 MG/3ML; MG/3ML
3 SOLUTION RESPIRATORY (INHALATION)
Status: DISCONTINUED | OUTPATIENT
Start: 2020-02-17 | End: 2020-02-27 | Stop reason: HOSPADM

## 2020-02-17 RX ORDER — MULTIVIT-MIN/IRON/FOLIC ACID/K 18-600-40
2-3 CAPSULE ORAL DAILY
Status: ON HOLD | COMMUNITY
End: 2020-02-27

## 2020-02-17 RX ORDER — NICOTINE 21 MG/24HR
1 PATCH, TRANSDERMAL 24 HOURS TRANSDERMAL DAILY PRN
Status: DISCONTINUED | OUTPATIENT
Start: 2020-02-17 | End: 2020-02-27 | Stop reason: HOSPADM

## 2020-02-17 RX ORDER — PROPOFOL 10 MG/ML
INJECTION, EMULSION INTRAVENOUS
Status: COMPLETED
Start: 2020-02-17 | End: 2020-02-17

## 2020-02-17 RX ORDER — VITAMIN E 268 MG
800 CAPSULE ORAL DAILY
Status: ON HOLD | COMMUNITY
End: 2020-02-27

## 2020-02-17 RX ORDER — ASPIRIN 81 MG/1
81 TABLET ORAL DAILY
Status: ON HOLD | COMMUNITY
End: 2020-02-27

## 2020-02-17 RX ORDER — MULTIVIT WITH MINERALS/LUTEIN
1000 TABLET ORAL DAILY
Status: ON HOLD | COMMUNITY
End: 2020-02-27

## 2020-02-17 RX ORDER — PROPOFOL 10 MG/ML
INJECTION, EMULSION INTRAVENOUS
Status: DISCONTINUED
Start: 2020-02-17 | End: 2020-02-17 | Stop reason: WASHOUT

## 2020-02-17 RX ADMIN — NICOTINE 1 PATCH: 21 PATCH, EXTENDED RELEASE TRANSDERMAL at 20:40

## 2020-02-17 RX ADMIN — OSELTAMIVIR PHOSPHATE 75 MG: 75 CAPSULE ORAL at 10:12

## 2020-02-17 RX ADMIN — INSULIN ASPART 1 UNITS: 100 INJECTION, SOLUTION INTRAVENOUS; SUBCUTANEOUS at 00:12

## 2020-02-17 RX ADMIN — ALBUTEROL SULFATE 6 PUFF: 90 AEROSOL, METERED RESPIRATORY (INHALATION) at 01:39

## 2020-02-17 RX ADMIN — METHYLPREDNISOLONE SODIUM SUCCINATE 125 MG: 125 INJECTION, POWDER, FOR SOLUTION INTRAMUSCULAR; INTRAVENOUS at 10:09

## 2020-02-17 RX ADMIN — PROPOFOL 75 MCG/KG/MIN: 10 INJECTION, EMULSION INTRAVENOUS at 01:39

## 2020-02-17 RX ADMIN — ALBUTEROL SULFATE 6 PUFF: 90 AEROSOL, METERED RESPIRATORY (INHALATION) at 15:25

## 2020-02-17 RX ADMIN — PROPOFOL 20 MCG/KG/MIN: 10 INJECTION, EMULSION INTRAVENOUS at 12:15

## 2020-02-17 RX ADMIN — PROPOFOL 75 MCG/KG/MIN: 10 INJECTION, EMULSION INTRAVENOUS at 04:35

## 2020-02-17 RX ADMIN — ALBUTEROL SULFATE 6 PUFF: 90 AEROSOL, METERED RESPIRATORY (INHALATION) at 12:32

## 2020-02-17 RX ADMIN — HYDROMORPHONE HYDROCHLORIDE 0.5 MG: 1 INJECTION, SOLUTION INTRAMUSCULAR; INTRAVENOUS; SUBCUTANEOUS at 10:07

## 2020-02-17 RX ADMIN — METHYLPREDNISOLONE SODIUM SUCCINATE 125 MG: 125 INJECTION, POWDER, FOR SOLUTION INTRAMUSCULAR; INTRAVENOUS at 20:31

## 2020-02-17 RX ADMIN — CEFTRIAXONE SODIUM 1 G: 1 INJECTION, SOLUTION INTRAVENOUS at 22:19

## 2020-02-17 RX ADMIN — MIDAZOLAM HYDROCHLORIDE 1 MG/HR: 5 INJECTION, SOLUTION INTRAMUSCULAR; INTRAVENOUS at 09:38

## 2020-02-17 RX ADMIN — HYDROMORPHONE HYDROCHLORIDE 0.5 MG: 1 INJECTION, SOLUTION INTRAMUSCULAR; INTRAVENOUS; SUBCUTANEOUS at 10:16

## 2020-02-17 RX ADMIN — DOXYCYCLINE 100 MG: 100 INJECTION, POWDER, LYOPHILIZED, FOR SOLUTION INTRAVENOUS at 19:53

## 2020-02-17 RX ADMIN — PROPOFOL 75 MCG/KG/MIN: 10 INJECTION, EMULSION INTRAVENOUS at 06:23

## 2020-02-17 RX ADMIN — INSULIN ASPART 1 UNITS: 100 INJECTION, SOLUTION INTRAVENOUS; SUBCUTANEOUS at 04:33

## 2020-02-17 RX ADMIN — HYDROMORPHONE HYDROCHLORIDE 0.5 MG: 1 INJECTION, SOLUTION INTRAMUSCULAR; INTRAVENOUS; SUBCUTANEOUS at 20:14

## 2020-02-17 RX ADMIN — FAMOTIDINE 20 MG: 20 INJECTION, SOLUTION INTRAVENOUS at 21:16

## 2020-02-17 RX ADMIN — HYDROMORPHONE HYDROCHLORIDE 0.5 MG: 1 INJECTION, SOLUTION INTRAMUSCULAR; INTRAVENOUS; SUBCUTANEOUS at 01:45

## 2020-02-17 RX ADMIN — METHYLPREDNISOLONE SODIUM SUCCINATE 125 MG: 125 INJECTION, POWDER, FOR SOLUTION INTRAMUSCULAR; INTRAVENOUS at 03:12

## 2020-02-17 RX ADMIN — PROPOFOL 75 MCG/KG/MIN: 10 INJECTION, EMULSION INTRAVENOUS at 08:38

## 2020-02-17 RX ADMIN — FAMOTIDINE 20 MG: 20 INJECTION, SOLUTION INTRAVENOUS at 10:24

## 2020-02-17 RX ADMIN — CHLORHEXIDINE GLUCONATE ORAL RINSE 15 ML: 1.2 SOLUTION DENTAL at 08:20

## 2020-02-17 RX ADMIN — ENOXAPARIN SODIUM 90 MG: 100 INJECTION SUBCUTANEOUS at 06:16

## 2020-02-17 RX ADMIN — METHYLPREDNISOLONE SODIUM SUCCINATE 125 MG: 125 INJECTION, POWDER, FOR SOLUTION INTRAMUSCULAR; INTRAVENOUS at 15:49

## 2020-02-17 RX ADMIN — CHLORHEXIDINE GLUCONATE ORAL RINSE 15 ML: 1.2 SOLUTION DENTAL at 20:18

## 2020-02-17 RX ADMIN — SODIUM CHLORIDE, POTASSIUM CHLORIDE, SODIUM LACTATE AND CALCIUM CHLORIDE: 600; 310; 30; 20 INJECTION, SOLUTION INTRAVENOUS at 10:04

## 2020-02-17 RX ADMIN — ALBUTEROL SULFATE 6 PUFF: 90 AEROSOL, METERED RESPIRATORY (INHALATION) at 05:05

## 2020-02-17 RX ADMIN — INSULIN ASPART 1 UNITS: 100 INJECTION, SOLUTION INTRAVENOUS; SUBCUTANEOUS at 12:25

## 2020-02-17 RX ADMIN — OSELTAMIVIR PHOSPHATE 75 MG: 75 CAPSULE ORAL at 20:41

## 2020-02-17 RX ADMIN — Medication 25 MCG/HR: at 09:54

## 2020-02-17 RX ADMIN — ALBUTEROL SULFATE 6 PUFF: 90 AEROSOL, METERED RESPIRATORY (INHALATION) at 22:13

## 2020-02-17 RX ADMIN — INSULIN ASPART 1 UNITS: 100 INJECTION, SOLUTION INTRAVENOUS; SUBCUTANEOUS at 08:10

## 2020-02-17 RX ADMIN — ALBUTEROL SULFATE 6 PUFF: 90 AEROSOL, METERED RESPIRATORY (INHALATION) at 09:31

## 2020-02-17 RX ADMIN — IPRATROPIUM BROMIDE AND ALBUTEROL SULFATE 3 ML: .5; 3 SOLUTION RESPIRATORY (INHALATION) at 19:13

## 2020-02-17 RX ADMIN — ENOXAPARIN SODIUM 90 MG: 100 INJECTION SUBCUTANEOUS at 19:57

## 2020-02-17 RX ADMIN — INSULIN ASPART 2 UNITS: 100 INJECTION, SOLUTION INTRAVENOUS; SUBCUTANEOUS at 20:29

## 2020-02-17 RX ADMIN — INSULIN ASPART 1 UNITS: 100 INJECTION, SOLUTION INTRAVENOUS; SUBCUTANEOUS at 16:02

## 2020-02-17 ASSESSMENT — ACTIVITIES OF DAILY LIVING (ADL)
ADLS_ACUITY_SCORE: 21
ADLS_ACUITY_SCORE: 19
ADLS_ACUITY_SCORE: 21
ADLS_ACUITY_SCORE: 21

## 2020-02-17 NOTE — PLAN OF CARE
Pt remains vented Rate 12 Tv 650 PEEP 10 FIO2 decreased from 40% to 35%. HRR SB 50's. Afebrile and VSS stable.  Lungs clear initially and through most of shift until pt began to fight vent then lungs became wheezy bilaterally. Inline suction preformed with small amounts of thick white sputum. Prop titrated from  throughout shift d/t  increasingly fighting vent. Tube feeding continues at 20 ml/hr tolerating well with no residual. Bowel sounds active and passing flatus.COntiuing oral cares and repo. Q2hrs. Adequate urine output. Will continue to monitor.    Face to face report given with opportunity to observe patient.    Report given to Jennifer Grant RN   2/17/2020  6:57 AM

## 2020-02-17 NOTE — PROGRESS NOTES
Patient remains intubated with an 8.0 ETT secured @27cm at the teeth.  Breath sounds equal bilat.  Scattered wheezes.  Albuterol 6puffs given prn.  No changes made to vent settings.  AC 12 650 10 peep 35%. Sx moderate amount of thick yellow tan secretions.  ABG drawn.  94% spao2.

## 2020-02-17 NOTE — PLAN OF CARE
02/16/20 1600   Vitals   Temp 98.1  F (36.7  C)   Temp src Tympanic   Pulse 53   Heart Rate 57   Heart Rate/Source Monitor   /85   BP Location Right arm   BP - Mean 97   Resp 14   Oxygen Therapy   SpO2 96 %   O2 Device Mechanical Ventilator   FiO2 (%) 40 %   Suction Occurrance 1     Patient remains sedated 60-70 of Prop; turned/repoed/oral cares/ PROM Q2 hours. Sedation vacation performed today and was not tolerated well; patient able to open eyes, and move spontaneously, but did not follow commands. Tubing changed on vent; linen change and bed bath. Any movement requires increase on prop as BP elevates and patient fights the vent. No injuries or events this shift. Assessments as charted. Lungs tight, in and ex wheezes as charted.     Face to face report given with opportunity to observe patient.    Report given to DAVID Galindo RN   2/16/2020  8:51 PM

## 2020-02-17 NOTE — PLAN OF CARE
Prior to Admission Medication Reconciliation:     Medications added:   [] None  [x] As listed below:    b-complex- wife reported    Vitamin e- wife reported    cholestoff- wife reported    Vitamin c- wife reported    Aspirin- wife reported    Medications deleted:   [] None  [x] As listed below: all > year and wife didn't see:    Dhea- wife didn't see it in his OTC supplements    Omega 3    Flaxseed oil    Dyazide    viagra    Lecithin    Beta-carotene    Changes made to existing medications:   [x] None  [] As listed below:    Last times/dates taken verified with patient:  [x] Yes- completed myself: wife stated she didn't think he took anything on Friday the 14th. Last time he would have taken anything would have been the 13th.    [] Nurse completed no changes made  [] Unable to review with patient:  [] Nurse completed/changes made:     Allergy modifications:    [x]Did not review  []Patient verified NKA  []Patient verified current existing allergies: no changes made  []New allergies: listed below      Medication reconciliation sources:   []Patient  [x]Patient family member/emergency contact: Elisha- spouse  [][facility] Report Review  []Epic Chart Review  []Care Everywhere review  [x]Pharmacy med list: [facility]- tamiflu, zpak, norvasc, simvastatin, prednisone and ventolin all filled 2/14. Patient admitted to ICU 2/15.   []Pharmacy phone call  []Outside meds dispense report  []Nursing home MAR:  []Other: **    Is patient on coumadin?  [x]No    Requests for consultation by provider or pharmacist:   [] Patient understands why all of their meds were prescribed and how to take them. No questions.   [] Fill dates coincide with compliancy for all maintenance meds.   [] Fill dates do not coincide with compliancy with maintenance meds. See notes in PTA medlist about how patient is taking.   [] Patient has questions about the following:    Comments: unable to speak with patient since he is intubated. Spoke with his wife.  Pharmacy only has this months fills on file. Note from ED on 2/14/20 stated that patient had quit his COPD and HTN meds approx 6 months ago due to financial reasons.    Kirsten Jara on 2/17/2020 at 11:01 AM       Discrepancies: [] No [x]Not Applicable []Yes: listed below    Time spent on medication reconciliation:   [x]5-20 mins  []20-40 mins  []> 40 mins    Issues completing PTA medication reconciliation:  [] On hold for a long time  [] Waited for a call back  [] Fax didn't come through  [] Fax took a long time  [] Other:    Notifying appropriate party of changes/additions/discrepancies:  [] Notified attending provider via text page  [] Notified attending provider in person  [] Notified pharmacy  [] Notified nurse  [] Attending provider not available, left detailed notes  [] Changes/additions made don't need provider notification because provider has not seen patient or input any orders  [x] Changes/additions made don't need provider notification because changes made are to medications not ordered      Medications Prior to Admission   Medication Sig Dispense Refill Last Dose     amLODIPine (NORVASC) 10 MG tablet Take 1 tablet (10 mg) by mouth daily 30 tablet 0 Past Week at Unknown time     aspirin 81 MG EC tablet Take 81 mg by mouth daily   Past Week at Unknown time     azithromycin (ZITHROMAX) 250 MG tablet Take 1 tablet (250 mg) by mouth daily 4 tablet 0 Past Week at Unknown time     B Complex-C (SUPER B COMPLEX PO) Take 1 tablet by mouth daily   Past Week at Unknown time     multivitamin w/minerals (MULTI-VITAMIN) tablet Take 1 tablet by mouth daily   Past Week at Unknown time     oseltamivir (TAMIFLU) 75 MG capsule Take 1 capsule (75 mg) by mouth 2 times daily for 5 days 10 capsule 0 Past Week at Unknown time     Plant Sterols and Stanols (CHOLESTOFF) 450 MG TABS Take 900 mg by mouth daily   Past Week at Unknown time     predniSONE (DELTASONE) 20 MG tablet Take two tablets (= 40mg) each day for 5 (five) days 10  tablet 0 Past Week at Unknown time     simvastatin (ZOCOR) 40 MG tablet Take 1 tablet by mouth At Bedtime. 30 tablet 6 Past Week at Unknown time     vitamin C (ASCORBIC ACID) 1000 MG TABS Take 1,000 mg by mouth daily   Past Week at Unknown time     Vitamin D, Cholecalciferol, 25 MCG (1000 UT) TABS Take 2-3 tablets by mouth daily   Past Week at Unknown time     vitamin E (TOCOPHEROL) 400 units (360 mg) capsule Take 800 Units by mouth daily   Past Week at Unknown time     albuterol (PROAIR HFA/PROVENTIL HFA/VENTOLIN HFA) 108 (90 Base) MCG/ACT inhaler Inhale 1-2 puffs into the lungs every 4 hours as needed for shortness of breath / dyspnea or wheezing 1 Inhaler 0 Unknown at Unknown time     allopurinol (ZYLOPRIM) 300 MG tablet Take 300 mg by mouth daily    Unknown at Unknown time

## 2020-02-17 NOTE — CONSULTS
"Ry Man, 1965  Chief Complaint   Patient presents with     Shortness of Breath     Since DC this a.m.       Patient Active Problem List   Diagnosis     Influenza A     Acute respiratory failure with hypoxia (H)     Benign essential hypertension     Respiratory failure (H)       Concerns/Comments:   Ry Man is here for  Consult, evaluation and tx of right right thigh wound present prior to admission. Per progress notes clients wife explains that he has had this wound for quite some time and self cares for it at home and has not been treated for it.    Consult placed by Devin Grimaldo MD.     It was noted in the consult request that the thigh was \" draining pus\".     Upon assessment no drainage was noted, no redness or warmth to leonel wound area.     Unable to get measurements at this time, client is on a ventilator and did not tolerate position change in order for assessment to continue.     Picture was taken     Picture of coccyx was taken as well, due to thought of wound was located there. This is a healed wound. Encouraged use of sacrum foam dressing while intubated.     Objective:   Location:   Right thigh   Drainage:   None     Odor:   none  Measurement:   Unable to measure   Edges:  open  Base:   UTV  Surrounding Skin:   WDL       Plan of care:   Optifoam gentle  3x3, change every 3 days or as needed, monitor for drainage and s/s of infection.       Education:  Wound care instructions/education provided. Patient verbalized understanding of instruction/education.    CC: No Ref-Primary, Physician/Devin Grimaldo MD                  "

## 2020-02-17 NOTE — PLAN OF CARE
Right wrist, Left wrist and soft restraints  continued 2/17/2020    Clinical Justification: Pulling lines, pulling tubes, and pulling equipment  Less Restrictive Alternative: 1:1 patient care, Repositioning  Attending Physician Notified: MD ordered restraint, Attending Physician's Name: Dr. Samson   New orders placed No (order still valid)  Length of Order: 1 Day      Estefany Grant RN

## 2020-02-17 NOTE — PLAN OF CARE
Right wrist and Left wrist restraints continued 2/17/2020    Clinical Justification: Pulling lines, pulling tubes, and pulling equipment  Less Restrictive Alternative: 1:1 patient care, Repositioning, Re-evaluate equipment  Attending Physician Notified: MD ordered restraint, Attending Physician's Name: Dr Gonzalez   New orders placed Yes  Length of Order: 1 Day      Mae Edmondson RN

## 2020-02-17 NOTE — PROGRESS NOTES
Patient remains on ventilator with settings at:  Mode: A/C  RR: 12  VT: 650  FIO2: 35  PEEP: 10  PS: 0  ET tube secured at: 27 at lip Size: 8  Cuff checked: Yes 28 cmH2o  Breath sounds: decreased and clear, Expiratory wheezes with agitation  SpO2: 97  Suction: scant amount of white  Weaning trial attempted: No Peep >8  Ambu bag present: Yes at bedside with 10 cmH2o peep  Auto peep 2 cmH20    Transported pt to ct scan. Ventilated with ambu bag 100% Peep 10 cmH2o. Spo2 %

## 2020-02-17 NOTE — PHARMACY
[facility]    Pharmacy      Antimicrobial Stewardship Note     Current antimicrobial therapy:  Anti-infectives (From now, onward)    Start     Dose/Rate Route Frequency Ordered Stop    02/16/20 0900  oseltamivir (TAMIFLU) capsule 75 mg      75 mg Oral 2 TIMES DAILY 02/15/20 0236      02/15/20 2200  cefTRIAXone in d5w (ROCEPHIN) intermittent infusion 1 g      1 g  over 30 Minutes Intravenous EVERY 24 HOURS 02/15/20 2143            Indication: CAP/Influenza    Days of Therapy: 3 Rocephin, 2 Tamiflu     Pertinent labs:  Creatinine   Creatinine   Date Value Ref Range Status   02/17/2020 0.88 0.66 - 1.25 mg/dL Final   02/16/2020 1.11 0.66 - 1.25 mg/dL Final   02/15/2020 1.17 0.66 - 1.25 mg/dL Final     WBC   WBC   Date Value Ref Range Status   02/17/2020 20.4 (H) 4.0 - 11.0 10e9/L Final   02/16/2020 12.2 (H) 4.0 - 11.0 10e9/L Final   02/15/2020 15.4 (H) 4.0 - 11.0 10e9/L Final     Procalcitonin   Procalcitonin   Date Value Ref Range Status   02/16/2020 0.41 ng/ml Final     Comment:     0.25-0.49 ng/ml  Possible early systemic infection or localized infection.     Recommendation: Encourage antibiotics only in the correct clinical context.   Consider obtaining blood cultures or other relevant cultures. Recheck PCT in   6-12 hours to ensure baseline low level. If repeat PCT is rising, consider   early systemic infection and consider starting antibiotics.     02/15/2020 0.39 ng/ml Final     Comment:     0.25-0.49 ng/ml  Possible early systemic infection or localized infection.     Recommendation: Encourage antibiotics only in the correct clinical context.   Consider obtaining blood cultures or other relevant cultures. Recheck PCT in   6-12 hours to ensure baseline low level. If repeat PCT is rising, consider   early systemic infection and consider starting antibiotics.       CRP   CRP Inflammation   Date Value Ref Range Status   02/16/2020 24.6 (H) 0.0 - 8.0 mg/L Final       Culture Results:   (2/15/20) Sputum  gram stain = GPC  (2/15/20) Sputum  (2/15/20) MRSA Surveillance = negative  (2/14/20) Blood  (2/14/20) Influenza = positive A     Recommendations/Interventions:  1. No stop date for Tamiflu, recommend 5 day duration, unless concomitant bacterial pneumonia - treatment can extend to 10 days if warranted.    Tim Moreno, [facility]  February 17, 2020

## 2020-02-17 NOTE — PROGRESS NOTES
Pt continues on   AC 12, Vt 650, Fio2 40,10 peep.  BBS dim, wheezes.  Suctioning mod sec from ETT..

## 2020-02-17 NOTE — PHARMACY
[facility]    Pharmacy    Antimicrobial Stewardship Note     Current antimicrobial therapy:  Anti-infectives (From now, onward)    Start     Dose/Rate Route Frequency Ordered Stop    02/16/20 0900  oseltamivir (TAMIFLU) capsule 75 mg      75 mg Oral 2 TIMES DAILY 02/15/20 0236      02/15/20 2200  cefTRIAXone in d5w (ROCEPHIN) intermittent infusion 1 g      1 g  over 30 Minutes Intravenous EVERY 24 HOURS 02/15/20 2143          Indication: influenza (Tamiflu), CAP (Rocephin)     Days of Therapy: 2    Pertinent labs:  Creatinine   Creatinine   Date Value Ref Range Status   02/16/2020 1.11 0.66 - 1.25 mg/dL Final   02/15/2020 1.17 0.66 - 1.25 mg/dL Final   02/15/2020 1.05 0.66 - 1.25 mg/dL Final     WBC   WBC   Date Value Ref Range Status   02/16/2020 12.2 (H) 4.0 - 11.0 10e9/L Final   02/15/2020 15.4 (H) 4.0 - 11.0 10e9/L Final   02/14/2020 18.9 (H) 4.0 - 11.0 10e9/L Final     Procalcitonin   Procalcitonin   Date Value Ref Range Status   02/16/2020 0.41 ng/ml Final     Comment:     0.25-0.49 ng/ml  Possible early systemic infection or localized infection.     Recommendation: Encourage antibiotics only in the correct clinical context.   Consider obtaining blood cultures or other relevant cultures. Recheck PCT in   6-12 hours to ensure baseline low level. If repeat PCT is rising, consider   early systemic infection and consider starting antibiotics.     02/15/2020 0.39 ng/ml Final     Comment:     0.25-0.49 ng/ml  Possible early systemic infection or localized infection.     Recommendation: Encourage antibiotics only in the correct clinical context.   Consider obtaining blood cultures or other relevant cultures. Recheck PCT in   6-12 hours to ensure baseline low level. If repeat PCT is rising, consider   early systemic infection and consider starting antibiotics.       CRP   CRP Inflammation   Date Value Ref Range Status   02/16/2020 24.6 (H) 0.0 - 8.0 mg/L Final     Culture Results:       "  Recommendations/Interventions:  Per hospitalist: ''Sputum Gram stain does show a few gram-positive cocci. Suspect this is colonization. Procalcitonin mildly elevated but not to the extent that would mandate antibiotics. Overall radiographic appearance is more suggestive of atelectasis and in fact larger nodular area of decreased attenuation in inferior lingula has some characteristics suggesting rounded atelectasis. Will attempt to obtain direct images of CT done in 2016 at [facility] for comparison. Certainly appearance is not suggestive of \"atypical\" organisms.  Discontinue doxycycline. For the time being continuing ceftriaxone with a planned treatment course of 3-5 days may not be unreasonable.\"    Using height from Care Everywhere in 2018 (185.4 cm), eCrCl ~85 mL/min. Current Tamiflu dosing appropriate for estimated renal function. No recommendations at this time. Will continue to monitor.     Mae Veliz RPH  February 16, 2020        "

## 2020-02-18 ENCOUNTER — APPOINTMENT (OUTPATIENT)
Dept: GENERAL RADIOLOGY | Facility: HOSPITAL | Age: 55
DRG: 207 | End: 2020-02-18
Attending: INTERNAL MEDICINE
Payer: COMMERCIAL

## 2020-02-18 PROBLEM — J44.1 CHRONIC OBSTRUCTIVE PULMONARY DISEASE WITH ACUTE EXACERBATION (H): Status: ACTIVE | Noted: 2020-02-18

## 2020-02-18 LAB
ALBUMIN SERPL-MCNC: 2.6 G/DL (ref 3.4–5)
ALP SERPL-CCNC: 46 U/L (ref 40–150)
ALT SERPL W P-5'-P-CCNC: 27 U/L (ref 0–70)
ANION GAP SERPL CALCULATED.3IONS-SCNC: 4 MMOL/L (ref 3–14)
ANION GAP SERPL CALCULATED.3IONS-SCNC: 7 MMOL/L (ref 3–14)
AST SERPL W P-5'-P-CCNC: 14 U/L (ref 0–45)
BACTERIA SPEC CULT: NORMAL
BASE EXCESS BLDA CALC-SCNC: 0.9 MMOL/L
BASE EXCESS BLDA CALC-SCNC: 2.6 MMOL/L
BILIRUB SERPL-MCNC: 0.2 MG/DL (ref 0.2–1.3)
BUN SERPL-MCNC: 34 MG/DL (ref 7–30)
BUN SERPL-MCNC: 35 MG/DL (ref 7–30)
CALCIUM SERPL-MCNC: 7.7 MG/DL (ref 8.5–10.1)
CALCIUM SERPL-MCNC: 8.1 MG/DL (ref 8.5–10.1)
CHLORIDE SERPL-SCNC: 109 MMOL/L (ref 94–109)
CHLORIDE SERPL-SCNC: 112 MMOL/L (ref 94–109)
CO2 SERPL-SCNC: 26 MMOL/L (ref 20–32)
CO2 SERPL-SCNC: 27 MMOL/L (ref 20–32)
CREAT SERPL-MCNC: 0.73 MG/DL (ref 0.66–1.25)
CREAT SERPL-MCNC: 0.77 MG/DL (ref 0.66–1.25)
CRP SERPL-MCNC: 27.7 MG/L (ref 0–8)
ERYTHROCYTE [DISTWIDTH] IN BLOOD BY AUTOMATED COUNT: 14.6 % (ref 10–15)
GFR SERPL CREATININE-BSD FRML MDRD: >90 ML/MIN/{1.73_M2}
GFR SERPL CREATININE-BSD FRML MDRD: >90 ML/MIN/{1.73_M2}
GLUCOSE BLDC GLUCOMTR-MCNC: 136 MG/DL (ref 70–99)
GLUCOSE BLDC GLUCOMTR-MCNC: 138 MG/DL (ref 70–99)
GLUCOSE BLDC GLUCOMTR-MCNC: 171 MG/DL (ref 70–99)
GLUCOSE BLDC GLUCOMTR-MCNC: 182 MG/DL (ref 70–99)
GLUCOSE BLDC GLUCOMTR-MCNC: 198 MG/DL (ref 70–99)
GLUCOSE BLDC GLUCOMTR-MCNC: 207 MG/DL (ref 70–99)
GLUCOSE BLDC GLUCOMTR-MCNC: 227 MG/DL (ref 70–99)
GLUCOSE SERPL-MCNC: 160 MG/DL (ref 70–99)
GLUCOSE SERPL-MCNC: 201 MG/DL (ref 70–99)
HCO3 BLD-SCNC: 28 MMOL/L (ref 21–28)
HCO3 BLD-SCNC: 28 MMOL/L (ref 21–28)
HCT VFR BLD AUTO: 40.3 % (ref 40–53)
HGB BLD-MCNC: 13 G/DL (ref 13.3–17.7)
MAGNESIUM SERPL-MCNC: 2.6 MG/DL (ref 1.6–2.3)
MCH RBC QN AUTO: 31.6 PG (ref 26.5–33)
MCHC RBC AUTO-ENTMCNC: 32.3 G/DL (ref 31.5–36.5)
MCV RBC AUTO: 98 FL (ref 78–100)
NT-PROBNP SERPL-MCNC: 239 PG/ML (ref 0–900)
O2/TOTAL GAS SETTING VFR VENT: ABNORMAL %
O2/TOTAL GAS SETTING VFR VENT: NORMAL %
OXYHGB MFR BLD: 97 % (ref 92–100)
OXYHGB MFR BLD: ABNORMAL % (ref 92–100)
PCO2 BLD: 45 MM HG (ref 35–45)
PCO2 BLD: 56 MM HG (ref 35–45)
PH BLD: 7.31 PH (ref 7.35–7.45)
PH BLD: 7.4 PH (ref 7.35–7.45)
PLATELET # BLD AUTO: 156 10E9/L (ref 150–450)
PO2 BLD: 221 MM HG (ref 80–105)
PO2 BLD: 94 MM HG (ref 80–105)
POTASSIUM SERPL-SCNC: 4.5 MMOL/L (ref 3.4–5.3)
POTASSIUM SERPL-SCNC: 4.9 MMOL/L (ref 3.4–5.3)
PROT SERPL-MCNC: 6.1 G/DL (ref 6.8–8.8)
RBC # BLD AUTO: 4.12 10E12/L (ref 4.4–5.9)
SODIUM SERPL-SCNC: 142 MMOL/L (ref 133–144)
SODIUM SERPL-SCNC: 143 MMOL/L (ref 133–144)
SPECIMEN SOURCE: NORMAL
TRIGL SERPL-MCNC: 180 MG/DL
WBC # BLD AUTO: 12.8 10E9/L (ref 4–11)

## 2020-02-18 PROCEDURE — 25000128 H RX IP 250 OP 636: Performed by: INTERNAL MEDICINE

## 2020-02-18 PROCEDURE — 25000132 ZZH RX MED GY IP 250 OP 250 PS 637: Performed by: INTERNAL MEDICINE

## 2020-02-18 PROCEDURE — 00000146 ZZHCL STATISTIC GLUCOSE BY METER IP

## 2020-02-18 PROCEDURE — 84478 ASSAY OF TRIGLYCERIDES: CPT | Performed by: INTERNAL MEDICINE

## 2020-02-18 PROCEDURE — 86140 C-REACTIVE PROTEIN: CPT | Performed by: INTERNAL MEDICINE

## 2020-02-18 PROCEDURE — 85027 COMPLETE CBC AUTOMATED: CPT | Performed by: INTERNAL MEDICINE

## 2020-02-18 PROCEDURE — 25000128 H RX IP 250 OP 636

## 2020-02-18 PROCEDURE — 82805 BLOOD GASES W/O2 SATURATION: CPT | Performed by: INTERNAL MEDICINE

## 2020-02-18 PROCEDURE — 71045 X-RAY EXAM CHEST 1 VIEW: CPT | Mod: TC

## 2020-02-18 PROCEDURE — 99292 CRITICAL CARE ADDL 30 MIN: CPT | Performed by: INTERNAL MEDICINE

## 2020-02-18 PROCEDURE — 83880 ASSAY OF NATRIURETIC PEPTIDE: CPT | Performed by: INTERNAL MEDICINE

## 2020-02-18 PROCEDURE — 25000125 ZZHC RX 250: Performed by: INTERNAL MEDICINE

## 2020-02-18 PROCEDURE — 36415 COLL VENOUS BLD VENIPUNCTURE: CPT | Performed by: INTERNAL MEDICINE

## 2020-02-18 PROCEDURE — 80048 BASIC METABOLIC PNL TOTAL CA: CPT | Performed by: INTERNAL MEDICINE

## 2020-02-18 PROCEDURE — 94640 AIRWAY INHALATION TREATMENT: CPT

## 2020-02-18 PROCEDURE — 94003 VENT MGMT INPAT SUBQ DAY: CPT

## 2020-02-18 PROCEDURE — 36600 WITHDRAWAL OF ARTERIAL BLOOD: CPT

## 2020-02-18 PROCEDURE — 25800030 ZZH RX IP 258 OP 636: Performed by: INTERNAL MEDICINE

## 2020-02-18 PROCEDURE — 80053 COMPREHEN METABOLIC PANEL: CPT | Performed by: INTERNAL MEDICINE

## 2020-02-18 PROCEDURE — 99291 CRITICAL CARE FIRST HOUR: CPT | Performed by: INTERNAL MEDICINE

## 2020-02-18 PROCEDURE — 94640 AIRWAY INHALATION TREATMENT: CPT | Mod: 76

## 2020-02-18 PROCEDURE — 40000275 ZZH STATISTIC RCP TIME EA 10 MIN

## 2020-02-18 PROCEDURE — 20000003 ZZH R&B ICU

## 2020-02-18 PROCEDURE — 83735 ASSAY OF MAGNESIUM: CPT | Performed by: INTERNAL MEDICINE

## 2020-02-18 RX ORDER — CLONIDINE 0.1 MG/24H
1 PATCH, EXTENDED RELEASE TRANSDERMAL WEEKLY
Status: DISCONTINUED | OUTPATIENT
Start: 2020-02-18 | End: 2020-02-21

## 2020-02-18 RX ORDER — HYDRALAZINE HYDROCHLORIDE 20 MG/ML
10 INJECTION INTRAMUSCULAR; INTRAVENOUS EVERY 4 HOURS PRN
Status: DISCONTINUED | OUTPATIENT
Start: 2020-02-18 | End: 2020-02-18

## 2020-02-18 RX ORDER — LORAZEPAM 2 MG/ML
INJECTION INTRAMUSCULAR
Status: DISCONTINUED
Start: 2020-02-18 | End: 2020-02-19 | Stop reason: HOSPADM

## 2020-02-18 RX ORDER — METOPROLOL TARTRATE 1 MG/ML
5 INJECTION, SOLUTION INTRAVENOUS EVERY 4 HOURS PRN
Status: DISCONTINUED | OUTPATIENT
Start: 2020-02-18 | End: 2020-02-19

## 2020-02-18 RX ORDER — METHYLPREDNISOLONE SODIUM SUCCINATE 125 MG/2ML
60 INJECTION, POWDER, LYOPHILIZED, FOR SOLUTION INTRAMUSCULAR; INTRAVENOUS EVERY 8 HOURS
Status: DISCONTINUED | OUTPATIENT
Start: 2020-02-18 | End: 2020-02-23

## 2020-02-18 RX ORDER — FUROSEMIDE 10 MG/ML
10 INJECTION INTRAMUSCULAR; INTRAVENOUS ONCE
Status: COMPLETED | OUTPATIENT
Start: 2020-02-18 | End: 2020-02-18

## 2020-02-18 RX ORDER — HYDRALAZINE HYDROCHLORIDE 20 MG/ML
10 INJECTION INTRAMUSCULAR; INTRAVENOUS
Status: DISCONTINUED | OUTPATIENT
Start: 2020-02-18 | End: 2020-02-20

## 2020-02-18 RX ORDER — ALBUTEROL SULFATE 0.83 MG/ML
2.5 SOLUTION RESPIRATORY (INHALATION)
Status: DISCONTINUED | OUTPATIENT
Start: 2020-02-18 | End: 2020-02-24

## 2020-02-18 RX ORDER — PROPOFOL 10 MG/ML
5-75 INJECTION, EMULSION INTRAVENOUS CONTINUOUS
Status: DISCONTINUED | OUTPATIENT
Start: 2020-02-18 | End: 2020-02-19

## 2020-02-18 RX ORDER — DEXMEDETOMIDINE HYDROCHLORIDE 4 UG/ML
INJECTION, SOLUTION INTRAVENOUS
Status: DISCONTINUED
Start: 2020-02-18 | End: 2020-02-19 | Stop reason: HOSPADM

## 2020-02-18 RX ORDER — AMLODIPINE BESYLATE 5 MG/1
10 TABLET ORAL DAILY
Status: DISCONTINUED | OUTPATIENT
Start: 2020-02-18 | End: 2020-02-27 | Stop reason: HOSPADM

## 2020-02-18 RX ORDER — LORAZEPAM 2 MG/ML
0.25 INJECTION INTRAMUSCULAR ONCE
Status: DISCONTINUED | OUTPATIENT
Start: 2020-02-18 | End: 2020-02-18

## 2020-02-18 RX ORDER — HYDRALAZINE HYDROCHLORIDE 20 MG/ML
INJECTION INTRAMUSCULAR; INTRAVENOUS
Status: DISCONTINUED
Start: 2020-02-18 | End: 2020-02-18 | Stop reason: HOSPADM

## 2020-02-18 RX ORDER — LORAZEPAM 2 MG/ML
INJECTION INTRAMUSCULAR
Status: COMPLETED
Start: 2020-02-18 | End: 2020-02-18

## 2020-02-18 RX ORDER — PROPOFOL 10 MG/ML
INJECTION, EMULSION INTRAVENOUS
Status: DISCONTINUED
Start: 2020-02-18 | End: 2020-02-19 | Stop reason: HOSPADM

## 2020-02-18 RX ORDER — LORAZEPAM 2 MG/ML
2 INJECTION INTRAMUSCULAR ONCE
Status: COMPLETED | OUTPATIENT
Start: 2020-02-18 | End: 2020-02-18

## 2020-02-18 RX ORDER — ALBUTEROL SULFATE 0.83 MG/ML
SOLUTION RESPIRATORY (INHALATION)
Status: DISCONTINUED
Start: 2020-02-18 | End: 2020-02-19 | Stop reason: HOSPADM

## 2020-02-18 RX ORDER — DEXMEDETOMIDINE HYDROCHLORIDE 4 UG/ML
0.2-0.7 INJECTION, SOLUTION INTRAVENOUS CONTINUOUS
Status: DISCONTINUED | OUTPATIENT
Start: 2020-02-18 | End: 2020-02-19

## 2020-02-18 RX ADMIN — IPRATROPIUM BROMIDE AND ALBUTEROL SULFATE 3 ML: .5; 3 SOLUTION RESPIRATORY (INHALATION) at 07:25

## 2020-02-18 RX ADMIN — METOPROLOL TARTRATE 5 MG: 1 INJECTION, SOLUTION INTRAVENOUS at 16:20

## 2020-02-18 RX ADMIN — INSULIN ASPART 2 UNITS: 100 INJECTION, SOLUTION INTRAVENOUS; SUBCUTANEOUS at 00:05

## 2020-02-18 RX ADMIN — IPRATROPIUM BROMIDE AND ALBUTEROL SULFATE 3 ML: .5; 3 SOLUTION RESPIRATORY (INHALATION) at 15:05

## 2020-02-18 RX ADMIN — IPRATROPIUM BROMIDE AND ALBUTEROL SULFATE 3 ML: .5; 3 SOLUTION RESPIRATORY (INHALATION) at 19:37

## 2020-02-18 RX ADMIN — ALBUTEROL SULFATE 2.5 MG: 2.5 SOLUTION RESPIRATORY (INHALATION) at 12:43

## 2020-02-18 RX ADMIN — DEXMEDETOMIDINE HYDROCHLORIDE 0.2 MCG/KG/HR: 4 INJECTION, SOLUTION INTRAVENOUS at 14:43

## 2020-02-18 RX ADMIN — CEFTRIAXONE SODIUM 1 G: 1 INJECTION, SOLUTION INTRAVENOUS at 21:38

## 2020-02-18 RX ADMIN — METHYLPREDNISOLONE SODIUM SUCCINATE 125 MG: 125 INJECTION, POWDER, FOR SOLUTION INTRAMUSCULAR; INTRAVENOUS at 08:31

## 2020-02-18 RX ADMIN — OSELTAMIVIR PHOSPHATE 75 MG: 75 CAPSULE ORAL at 09:45

## 2020-02-18 RX ADMIN — AMLODIPINE BESYLATE 10 MG: 5 TABLET ORAL at 09:45

## 2020-02-18 RX ADMIN — OSELTAMIVIR PHOSPHATE 75 MG: 75 CAPSULE ORAL at 21:11

## 2020-02-18 RX ADMIN — CHLORHEXIDINE GLUCONATE ORAL RINSE 15 ML: 1.2 SOLUTION DENTAL at 08:35

## 2020-02-18 RX ADMIN — HYDRALAZINE HYDROCHLORIDE 10 MG: 20 INJECTION INTRAMUSCULAR; INTRAVENOUS at 19:41

## 2020-02-18 RX ADMIN — HYDROMORPHONE HYDROCHLORIDE 0.5 MG: 1 INJECTION, SOLUTION INTRAMUSCULAR; INTRAVENOUS; SUBCUTANEOUS at 04:00

## 2020-02-18 RX ADMIN — METHYLPREDNISOLONE SODIUM SUCCINATE 125 MG: 125 INJECTION, POWDER, FOR SOLUTION INTRAMUSCULAR; INTRAVENOUS at 04:01

## 2020-02-18 RX ADMIN — IPRATROPIUM BROMIDE AND ALBUTEROL SULFATE 3 ML: .5; 3 SOLUTION RESPIRATORY (INHALATION) at 11:07

## 2020-02-18 RX ADMIN — NICOTINE 1 PATCH: 21 PATCH, EXTENDED RELEASE TRANSDERMAL at 21:12

## 2020-02-18 RX ADMIN — CHLORHEXIDINE GLUCONATE ORAL RINSE 15 ML: 1.2 SOLUTION DENTAL at 20:23

## 2020-02-18 RX ADMIN — LORAZEPAM 2 MG: 2 INJECTION, SOLUTION INTRAMUSCULAR; INTRAVENOUS at 12:15

## 2020-02-18 RX ADMIN — CLONIDINE 1 PATCH: 0.1 PATCH TRANSDERMAL at 16:24

## 2020-02-18 RX ADMIN — PROPOFOL 30 MCG/KG/MIN: 10 INJECTION, EMULSION INTRAVENOUS at 12:22

## 2020-02-18 RX ADMIN — FUROSEMIDE 10 MG: 10 INJECTION, SOLUTION INTRAMUSCULAR; INTRAVENOUS at 13:40

## 2020-02-18 RX ADMIN — HYDROMORPHONE HYDROCHLORIDE 0.5 MG: 1 INJECTION, SOLUTION INTRAMUSCULAR; INTRAVENOUS; SUBCUTANEOUS at 15:50

## 2020-02-18 RX ADMIN — HYDRALAZINE HYDROCHLORIDE 10 MG: 20 INJECTION INTRAMUSCULAR; INTRAVENOUS at 23:06

## 2020-02-18 RX ADMIN — INSULIN ASPART 1 UNITS: 100 INJECTION, SOLUTION INTRAVENOUS; SUBCUTANEOUS at 16:49

## 2020-02-18 RX ADMIN — DOXYCYCLINE 100 MG: 100 INJECTION, POWDER, LYOPHILIZED, FOR SOLUTION INTRAVENOUS at 08:20

## 2020-02-18 RX ADMIN — ENOXAPARIN SODIUM 100 MG: 100 INJECTION SUBCUTANEOUS at 17:46

## 2020-02-18 RX ADMIN — FAMOTIDINE 20 MG: 20 INJECTION, SOLUTION INTRAVENOUS at 21:31

## 2020-02-18 RX ADMIN — MIDAZOLAM HYDROCHLORIDE 8 MG/HR: 5 INJECTION, SOLUTION INTRAMUSCULAR; INTRAVENOUS at 08:27

## 2020-02-18 RX ADMIN — DEXMEDETOMIDINE HYDROCHLORIDE 0.7 MCG/KG/HR: 4 INJECTION, SOLUTION INTRAVENOUS at 20:02

## 2020-02-18 RX ADMIN — METHYLPREDNISOLONE SODIUM SUCCINATE 62.5 MG: 125 INJECTION, POWDER, FOR SOLUTION INTRAMUSCULAR; INTRAVENOUS at 16:26

## 2020-02-18 RX ADMIN — LORAZEPAM 0.25 MG: 2 INJECTION, SOLUTION INTRAMUSCULAR; INTRAVENOUS at 12:05

## 2020-02-18 RX ADMIN — SODIUM CHLORIDE, POTASSIUM CHLORIDE, SODIUM LACTATE AND CALCIUM CHLORIDE: 600; 310; 30; 20 INJECTION, SOLUTION INTRAVENOUS at 04:03

## 2020-02-18 RX ADMIN — HYDRALAZINE HYDROCHLORIDE 10 MG: 20 INJECTION INTRAMUSCULAR; INTRAVENOUS at 11:41

## 2020-02-18 RX ADMIN — ALBUTEROL SULFATE 6 PUFF: 90 AEROSOL, METERED RESPIRATORY (INHALATION) at 01:08

## 2020-02-18 RX ADMIN — FAMOTIDINE 20 MG: 20 INJECTION, SOLUTION INTRAVENOUS at 10:00

## 2020-02-18 RX ADMIN — ALBUTEROL SULFATE 6 PUFF: 90 AEROSOL, METERED RESPIRATORY (INHALATION) at 05:05

## 2020-02-18 RX ADMIN — HYDROMORPHONE HYDROCHLORIDE 0.5 MG: 1 INJECTION, SOLUTION INTRAMUSCULAR; INTRAVENOUS; SUBCUTANEOUS at 12:21

## 2020-02-18 RX ADMIN — HYDRALAZINE HYDROCHLORIDE 10 MG: 20 INJECTION INTRAMUSCULAR; INTRAVENOUS at 15:39

## 2020-02-18 RX ADMIN — INSULIN ASPART 2 UNITS: 100 INJECTION, SOLUTION INTRAVENOUS; SUBCUTANEOUS at 20:27

## 2020-02-18 RX ADMIN — INSULIN ASPART 1 UNITS: 100 INJECTION, SOLUTION INTRAVENOUS; SUBCUTANEOUS at 13:20

## 2020-02-18 RX ADMIN — HYDROMORPHONE HYDROCHLORIDE 0.5 MG: 1 INJECTION, SOLUTION INTRAMUSCULAR; INTRAVENOUS; SUBCUTANEOUS at 21:05

## 2020-02-18 RX ADMIN — PROPOFOL 5 MCG/KG/MIN: 10 INJECTION, EMULSION INTRAVENOUS at 16:42

## 2020-02-18 RX ADMIN — DOXYCYCLINE 100 MG: 100 INJECTION, POWDER, LYOPHILIZED, FOR SOLUTION INTRAVENOUS at 20:14

## 2020-02-18 RX ADMIN — FENTANYL CITRATE 50 MCG: 50 INJECTION, SOLUTION INTRAMUSCULAR; INTRAVENOUS at 16:10

## 2020-02-18 RX ADMIN — SODIUM CHLORIDE, POTASSIUM CHLORIDE, SODIUM LACTATE AND CALCIUM CHLORIDE: 600; 310; 30; 20 INJECTION, SOLUTION INTRAVENOUS at 17:42

## 2020-02-18 RX ADMIN — HYDROMORPHONE HYDROCHLORIDE 0.5 MG: 1 INJECTION, SOLUTION INTRAMUSCULAR; INTRAVENOUS; SUBCUTANEOUS at 08:49

## 2020-02-18 RX ADMIN — ENOXAPARIN SODIUM 90 MG: 100 INJECTION SUBCUTANEOUS at 05:35

## 2020-02-18 RX ADMIN — LORAZEPAM 0.25 MG: 2 INJECTION INTRAMUSCULAR at 12:05

## 2020-02-18 ASSESSMENT — ACTIVITIES OF DAILY LIVING (ADL)
ADLS_ACUITY_SCORE: 21

## 2020-02-18 ASSESSMENT — MIFFLIN-ST. JEOR
SCORE: 1865.26
SCORE: 1859.26

## 2020-02-18 NOTE — PROVIDER NOTIFICATION
Pt had large amount of tan emesis/secretions (same color as tube feeding) from mouth after coughing from in-line suction. Writer checked for residuals from OG, returned 0 mL. Dr. John updated, verbal to stop tube feedings for now, and resume OG to low-intermittent suction.

## 2020-02-18 NOTE — PLAN OF CARE
Pt is sedated and intubated. RASS score of -2, versed running at 6 mg/hr, and fentanyl gtt running at 75 mcg/hr. Scheduled dilaudid given before repositioning. Pt repositioned every two hours, oral suctioning, and inline suction with turning. Foam dressing in place. ETT 27 at the lips. OG 60cm at the lips. LR @ 75mL/hr, IV abx and steroids given. VSS, afebrile. Manisha, cloudy urine. Blood sugars checked and sliding scale insulin per order.

## 2020-02-18 NOTE — PHARMACY
[facility]    Pharmacy      Antimicrobial Stewardship Note     Current antimicrobial therapy:  Anti-infectives (From now, onward)    Start     Dose/Rate Route Frequency Ordered Stop    02/17/20 1845  doxycycline (VIBRAMYCIN) 100 mg in D5W 250 mL intermittent infusion      100 mg  125-250 mL/hr over 1-2 Hours Intravenous EVERY 12 HOURS 02/17/20 1844      02/16/20 0900  oseltamivir (TAMIFLU) capsule 75 mg      75 mg Oral 2 TIMES DAILY 02/15/20 0236      02/15/20 2200  cefTRIAXone in d5w (ROCEPHIN) intermittent infusion 1 g      1 g  over 30 Minutes Intravenous EVERY 24 HOURS 02/15/20 2143            Indication: CAP/Influenza     Days of Therapy: 4 Rocephin, 3 Tamiflu, 1 Doxycycline     Pertinent labs:  Creatinine   Creatinine   Date Value Ref Range Status   02/18/2020 0.77 0.66 - 1.25 mg/dL Final   02/17/2020 0.86 0.66 - 1.25 mg/dL Final   02/17/2020 0.88 0.66 - 1.25 mg/dL Final     WBC   WBC   Date Value Ref Range Status   02/18/2020 12.8 (H) 4.0 - 11.0 10e9/L Final   02/17/2020 17.5 (H) 4.0 - 11.0 10e9/L Final   02/17/2020 20.4 (H) 4.0 - 11.0 10e9/L Final     Procalcitonin   Procalcitonin   Date Value Ref Range Status   02/17/2020 0.12 ng/ml Final     Comment:     0.05-0.24 ng/ml Low risk of systemic bacterial infection. Local bacterial   infection possible.  Recommendation: Assess other clinical features of   infection. Discourage antibiotics unless strong clinical suspicion for serious   infection.     02/16/2020 0.41 ng/ml Final     Comment:     0.25-0.49 ng/ml  Possible early systemic infection or localized infection.     Recommendation: Encourage antibiotics only in the correct clinical context.   Consider obtaining blood cultures or other relevant cultures. Recheck PCT in   6-12 hours to ensure baseline low level. If repeat PCT is rising, consider   early systemic infection and consider starting antibiotics.     02/15/2020 0.39 ng/ml Final     Comment:     0.25-0.49 ng/ml  Possible early systemic  infection or localized infection.     Recommendation: Encourage antibiotics only in the correct clinical context.   Consider obtaining blood cultures or other relevant cultures. Recheck PCT in   6-12 hours to ensure baseline low level. If repeat PCT is rising, consider   early systemic infection and consider starting antibiotics.       CRP   CRP Inflammation   Date Value Ref Range Status   02/18/2020 27.7 (H) 0.0 - 8.0 mg/L Final   02/17/2020 36.4 (H) 0.0 - 8.0 mg/L Final   02/16/2020 24.6 (H) 0.0 - 8.0 mg/L Final       Culture Results:   (2/15/20) Sputum gram stain = GPC  (2/15/20) Sputum  (2/15/20) MRSA Surveillance = negative  (2/14/20) Blood  (2/14/20) Influenza = positive A     Recommendations/Interventions:  1. No stop date for Tamiflu, recommend 5 day duration, unless concomitant bacterial pneumonia - treatment can extend to 10 days if warranted.  2. Consider addition of vancomycin per protocol since on ventilator and changing to Zosyn max dosing for expanded coverage:      Tim Moreno, [facility]  February 18, 2020

## 2020-02-18 NOTE — PLAN OF CARE
Weaned off propofol gtt this afternoon. Currently on versed gtt and fentanyl gtt, tolerating well. Afebrile. VSS. Currently SR, earlier in shift was claudia in the 50's.  Tolerated transfer to CT without difficulty. No residual noted from tube feed. ETT remains at 27cm at the lip, OG at 60cm. Turned and repositioned Q2h, dressing to right posterior thigh, sacral dressing in place for prophylaxis.     Face to face report given with opportunity to observe patient.    Report given to Ariana Edmondson RN   2/17/2020  7:14 PM

## 2020-02-18 NOTE — PROGRESS NOTES
Patient repeat ABG showed adequate ventilation and oxygenation after increasing respiratory rate to 18/min.  Weaning trial was performed, but failed due to severe hypotension.  I spoke with Dr. Vu, intensivist, who recommends replacing Versed with Precedex IV gtts to see if it helps with agitation, which can be withdrawal symptoms.    Also try to decrease FiO2 to 35% and if pt becomes hypoxic, then try to increase PEEP instead.  Will try weaning again tomorrow    Dio Gonzalez MD

## 2020-02-18 NOTE — PROGRESS NOTES
PS 8cm/CPAP5 cm trial today.Pt had -38 cm NIF, RR16, Ii615-548 ml.  Following commands, Spo2 90s on Fio2 .35.  Carlos trial for 20-25 min .  Became anxious, tacycardic, hypertensive.  Changed back to previous vent settings, sedated.

## 2020-02-18 NOTE — PROGRESS NOTES
[facility]    Medicine Progress Note - Hospitalist Service       Date of Admission:  2/14/2020  Assessment & Plan     #1 acute respiratory failure with hypoxia and hypercapnia, mechanical ventilatory support, influenza A infection with possible bacterial superimposed infection, COPD exacerbation, possible pulmonary embolism  Patient is remaining stable on mechanical ventilation, but he is showing persistent hypercapnia.  Discussed the case with Dr. Guaman, critical care specialist, who recommends trial of increasing respiratory rate from 12 to 18, and reassess with ABG.  Certainly, patient is not showing signs of air-trapping despite tidal volume being rather high.  Doxycycline was added yesterday, as the repeat chest CT showed new bilateral infiltrate.  We will also continue ceftriaxone at this time.  Patient's ABG today showed high PCO2, but this was artifact created by transiently administering 100% FiO2.  We will repeat the ABG.  We will also continue with the Tamiflu for influenza A  We will also continue with treatment dose enoxaparin for possible pulmonary embolism shown on initial chest CT angiogram.    #2 nutritional support  We are holding tube feeding at this time since there was evidence of a feeding tube material in his mouth suction.    #3 hyperkalemia  Potassium level stable today    #4 history of tobacco abuse, COPD  Patient has at least 70-pack-year history of tobacco abuse; will start nicotine replacement when indicated.    5.  Lung nodule  CT 12/12/2016 was obtained by Dr. Garland.  Appears the patient had a plain radiograph initially which demonstrated some nodular findings, possibly requested in the context of the patient's smoking history and dyspnea.  The patient was told, based on [facility] records, that the CT requested subsequently was suggestive of atelectasis and that a follow-up study would be indicated.  Appears this may have been delayed because of financial issues.  Report  of the 12/12/2016 CT indicates nodule suggestive of atelectasis in left upper lobe abutting the major fissure.  History indicates a subcentimeter nodule.  Dimensions are not noted in CT report.  Current study shows what is likely to be a similar appearing nodular area of decreased attenuation at the major fissure.  It is most prominent in the inferior lingula abutting the heart border with what are likely to be continuous nodular areas of decreased attenuation more cephalad in the right upper lobe.  Repeated plain radiographs over the course of the last several days does show some variability in the appearance of this area of nodular infiltrate, again suggestive of compressive atelectasis.  In the context of his significant secretions he is at risk of mucoid impaction.  Certainly follow-up in the near future as was recommended previously would be indicated.    Diet: NPO for Medical/Clinical Reasons Except for: No Exceptions  Adult Formula Drip Feeding: Continuous Other; Jevity 1.5; Orogastric tube; Goal Rate: 30; mL/hr; Medication - Feeding Tube Flush Frequency: At least 15-30 mL water before and after medication administration and with tube clogging; No; none    DVT Prophylaxis: Enoxaparin (Lovenox) SQ  Gill Catheter: in place, indication: Strict 1-2 Hour I&O  Code Status: Full Code      Disposition Plan   Expected discharge: Unknown at this time, recommended to Appropriate placement once Patient's respiratory status becomes stable.  Entered: Dio Gonzalez MD 02/18/2020, 6:49 AM     The patient's care was discussed with the Bedside Nurse and Critical care Consultant.    Critical care time 80 minutes    Dio Gonzalez MD  Hospitalist Service  [facility]    ______________________________________________________________________    Interval History   Patient remains on mechanical ventilation, and no acute event was noted  Yesterday, repeat chest CT showed new bilateral infiltrate, for which doxycycline was  added to ceftriaxone.  Patient remained stable overnight, but suctioning retrieved to feeding material from the mouth, and tube feeding was stopped.  Suctioning from the lungs did not show any feeding materials.    Patient is sedated with Versed and fentanyl drip, but required Dilaudid at times for agitation episodes.    Data reviewed today: I reviewed all medications, new labs and imaging results over the last 24 hours. I personally reviewed the chest x-ray image(s) showing pending .    Physical Exam   Vital Signs: Temp: 98.2  F (36.8  C) Temp src: Tympanic BP: 142/92 Pulse: 56 Heart Rate: 57 Resp: 15 SpO2: 93 % O2 Device: Mechanical Ventilator    Weight: 216 lbs 14.92 oz  General Appearance: Intubated and sedated, shows no distress  Respiratory: Clear to auscultation bilaterally with good air movement  Cardiovascular: S1-S2, regular rhythm and rate, no murmurs rubs or gallops  GI: Soft, nondistended, bowel sounds present  Skin: Intact  Other: Mild edema in the lateral upper extremities bilaterally    Data   Recent Labs   Lab 02/18/20  0448 02/17/20  1508 02/17/20  0455  02/14/20  2106 02/14/20  0511   WBC 12.8* 17.5* 20.4*   < > 18.9* 14.0*   HGB 13.0* 13.0* 13.4   < > 16.3 16.3   MCV 98 98 98   < > 94 93    165 169   < > 233 234    142 141   < > 136 139   POTASSIUM 4.9 5.1 4.8   < > 4.7 4.2   CHLORIDE 112* 114* 111*   < > 104 108   CO2 27 27 24   < > 24 25   BUN 34* 32* 32*   < > 27 17   CR 0.77 0.86 0.88   < > 0.87 0.89   ANIONGAP 4 1* 6   < > 8 6   CARLTON 8.1* 8.2* 8.2*   < > 9.2 8.7   * 172* 203*   < > 184* 111*   ALBUMIN 2.6* 2.7*  --    < >  --  4.4   PROTTOTAL 6.1* 5.9*  --    < >  --  8.3   BILITOTAL 0.2 0.2  --    < >  --  0.4   ALKPHOS 46 47  --    < >  --  94   ALT 27 26  --    < >  --  28   AST 14 19  --    < >  --  22   TROPI  --   --   --   --  <0.015 <0.015    < > = values in this interval not displayed.     Recent Results (from the past 24 hour(s))   CT Chest w/o Contrast     Narrative    CT CHEST W/O CONTRAST    HISTORY:  Acute resp illness, > 40 years old; Dependence on respirator  [ventilator] status; ; persistent hypoventilation despite increase in  tidal volume; please evaluate for air trapping; worsening inflammatory  marker; evaluate for infiltrate      TECHNIQUE:  Non-contrast helical thoracic CT was performed.    COMPARISON:  CT PE chest 2/14/2020    FINDINGS:           Tracheostomy is in place. Cardiac size is normal. There is no  pericardial or pleural effusion. The thoracic aorta is normal in size  and course. No abnormal thoracic adenopathy is identified.    The central airways are patent. Atelectasis or consolidation is  present at the right greater than left lung bases.    An enteric tube is present, with the proximal side hole just above the  GE junction.    Limited imaging of the upper abdomen demonstrates pericholecystic  fluid and a chronic left renal cyst.      Impression    IMPRESSION:    Right greater than left lower lobe atelectasis or consolidation.    Slightly proximally located enteric tube, with the proximal side hole  likely just above the GE junction.    Pericholecystic fluid. Consider follow-up ultrasound (NPO status  preferred).    ADINA FORMAN MD     Medications     dextrose       fentaNYL 75 mcg/hr (02/18/20 0530)     lactated ringers 75 mL/hr at 02/18/20 0403     midazolam 6 mg/hr (02/18/20 0530)       cefTRIAXone  1 g Intravenous Q24H     chlorhexidine  15 mL Mouth/Throat Q12H     doxycycline  intermittent infusion  100 mg Intravenous Q12H     enoxaparin ANTICOAGULANT  1 mg/kg Subcutaneous Q12H     famotidine  20 mg Intravenous Q12H     HYDROmorphone  0.5 mg Intravenous Q6H     insulin aspart  1-6 Units Subcutaneous Q4H     ipratropium - albuterol 0.5 mg/2.5 mg/3 mL  3 mL Nebulization 4x daily     methylPREDNISolone  125 mg Intravenous Q6H     nicotine   Transdermal Q8H     oseltamivir  75 mg Oral BID

## 2020-02-18 NOTE — PROGRESS NOTES
Reviewed CT results with patient's wife: bilateral consolodation    Added Doxycyline to Rocephin    Added PRN Nicotine topical    Appeared to be aspirating tube feeds, so feeds stopped

## 2020-02-18 NOTE — PLAN OF CARE
Face to face report given with opportunity to observe patient.    Report given to Mae Allen RN   2/18/2020  7:11 AM

## 2020-02-18 NOTE — PLAN OF CARE
Right wrist and Left wrist restraints continued 2/17/2020    Clinical Justification: Pulling lines, pulling tubes, and pulling equipment  Less Restrictive Alternative: Repositioning, Pain management  Attending Physician Notified: MD ordered restraint, Attending Physician's Name: Dr. John   New orders placed Yes  Length of Order: 1 Day      Ariana Allen RN

## 2020-02-18 NOTE — PROGRESS NOTES
Patient remains intubated on mechanical ventilation.  No changes made to vent settings.  AC 12 650 35% 10 peep.  Breath sounds clear and diminished to tight expiratory wheezes at times.  Duoneb given x1 this shift.  Followed by 6puffs Albuterol mdi prn.  97% spao2.  ABG drawn and sent @ 0500.  Tube taped and secured @27cm at teeth.  Sx small amount of thick yellow white secretions.

## 2020-02-18 NOTE — PROGRESS NOTES
"[facility]    Medicine Progress Note - Hospitalist Service       Date of Admission:  2/14/2020  Assessment & Plan   Ry Man is a 54 year old  man who was admitted on 2/14/2020. This is a 54-year-old man who presented twice to emergency department.  On first evaluation rapid screen showed influenza.  He apparently was otherwise compensated although remained tachypneic and was discharged from emergency department treated with oseltamivir and prednisone.  He returned with increased dyspnea and hypertension.  Arterial blood gas analysis showed combined respiratory metabolic acidosis (ABG 7.4/37 on first visit) he showed moderate leukocytosis.  Examination showed significant bronchospasm with diffuse wheezing.  Taken to CT for CT of the chest \"angiogram\" and became unresponsive on transport back to emergency department.  Emergently intubated in the emergency department.  Since then oxygenation has been adequate on relatively low FiO2.   Lactate has never been elevated.    E 86     Prior records from outpatient reviewed.  The patient does carry a history of COPD with at least several exacerbations during 2019.  Pulmonary function testing indicated on outpatient records although I do not find direct records of pulmonary function testing in the [facility] or our records.  FEV1/FVC 50%.  FEV1 33%.   DLCO 42% predicted.  % predicted with % predicted.  Reversibility was indicated.  The patient continues to smoke cigarettes with a 70-pack-year history.     1.    Mechanical ventilatory support  Acute hypercapnic respiratory failure  Patient's gas exchange appears to be with PCO2 being approximately the same level.  Oxygenation overall appears to have improved from admission and FiO2 has gone down to 35%.  Patient sounds a lot less bronchospastic and hopefully his obstructive process is improving with steroid and bronchodilators.  ;Currently patient's vent setting is AC 12, tidal volume 650, PEEP of 10, " "FiO2 35%  Pt is at risk of barotrauma at tidal volume of 650; will repeat ABG and consider reducing TV.    Will also repeat chest CT to see if pt is developing new infiltrates.    2.  Influenza A infection  Likely the prominent cause of his bronchospasm/increased airflow obstruction.  As below likely poorly controlled COPD with airway reactivity.  Consider oseltamivir at treatment doses.  - 2/17: continuing with oseltamivir.      3.  Possible lower respiratory tract infection  Sputum Gram stain does show a few gram-positive cocci.  Suspect this is colonization.  Procalcitonin mildly elevated but not to the extent that would mandate antibiotics.  Overall radiographic appearance is more suggestive of atelectasis and in fact larger nodular area of decreased attenuation in inferior lingula has some characteristics suggesting rounded atelectasis.  Will attempt to obtain direct images of CT done in 2016 at [facility] for comparison.  Certainly appearance is not suggestive of \"atypical\" organisms.  Discontinue doxycycline.  For the time being continuing ceftriaxone with a planned treatment course of 3-5 days may not be unreasonable.  He does have what appears to be history of firm colitis although he is avoided medical care for several superficial abscesses which he has had.  This could increase his risk of a staphylococcal infection although radiographic appearance quite atypical for this.  Preemptive treatment for staph species does not seem warranted.  - 2/17: continuing with ceftriaxone;  Will check chest CT and consider broadening antibiotics if new infiltrates    4.  COPD with airway reactivity  As above overall course of most consistent with significant airflow obstruction.  He has not had frequent encounters with medical care however reviewing available records at [facility] it does appear that this is most consistent with COPD rather than asthma per se.  He is also had issues with sinusitis.  Not certain whether " symptomatically this is been present more recently.  This could give him an increased risk of airway reactivity.  Pulmonary function studies from 2017 as detailed above.  This demonstrates airflow obstruction as well as airway reactivity.  Air trapping without significant hyperinflation.  Unfortunately I do not have direct tracings of this available.  Treatment as above including bronchodilators and steroids.  Dr. Garland note does indicate that he has had, at least as of last year when he was last evaluated as an outpatient, frequent exacerbations.  We will discuss this further with the patient when he is able to be more interactive.  - 2/17: ABG showed persistent elevation of pCO2, suggesting hypoventilatioin despite ventilator support.  Continue bronchodilators and IV steroids    5.  Possible pulmonary embolism  CT from initial evaluation while technically limited study did suggest thromboembolic disease.  Currently excessively high risk for repeat CT imaging.  Treat empirically for pulmonary embolism with enoxaparin every 12 hours.     For the time being continue preemptive treatment.  Renal function is improved but likely not at his baseline and contrast dose  repeat a contrast load probably carries more risk than benefit.  Depending on his course could still consider repeat CT in another 24-36 hours.  However may be necessary to commit him to at least 2-3 months of anticoagulation.  May be most reasonable to regard this as a provoked pulmonary embolism if in fact one is present.  - 2/17: continuing with enoxaparin    6.  Tobacco use disorder  At least a 70-pack-year history of tobacco abuse.  Nicotine replacement when this is indicated.  Will discuss with the patient when he is able to be more interactive tobacco cessation .    7.  Lung nodule  As above attempt to obtain direct images from [facility].  CT 12/12/2016 was obtained by Dr. Garland.  Appears the patient had a plain radiograph initially which  "demonstrated some nodular findings, possibly requested in the context of the patient's smoking history and dyspnea.  The patient was told, based on [facility] records, that the CT requested subsequently was suggestive of atelectasis and that a follow-up study would be indicated.  Appears this may have been delayed because of financial issues.  Report of the 12/12/2016 CT indicates nodule suggestive of atelectasis in left upper lobe abutting the major fissure.  History indicates a subcentimeter nodule.  Dimensions are not noted in CT report.  Current study shows what is likely to be a similar appearing nodular area of decreased attenuation at the major fissure.  It is most prominent in the inferior lingula abutting the heart border with what are likely to be continuous nodular areas of decreased attenuation more cephalad in the right upper lobe.  Repeated plain radiographs over the course of the last several days does show some variability in the appearance of this area of nodular infiltrate, again suggestive of compressive atelectasis.  In the context of his significant secretions he is at risk of mucoid impaction.  Certainly follow-up in the near future as was recommended previously would be indicated.    CT from 2016 \"pushed\" to our PACS today.   stable renal cyst.  Appearance of the nodular area of decreased attenuation following the course of the major fissure mid lung beginning in upper lobe somewhat more prominent perhaps in part because of contrast.  Nodular area abutting left heart border in the lingula somewhat larger on the more recent study performed here.  Particularly 2016 study does have a suggestion of rounded atelectasis although does not meet all the criteria for this.  Reviewed [facility] records.  Patient was, at least based on those records, informed of this study.  Repeat was limited in part by financial concerns because of the expense of his initial CT.  Unfortunately appears regular medical " follow-up was difficult for him beginning shortly after that.    8.  Nutritional support  Suspect he is going to require at least several days of mechanical ventilatory support.    Have initiated enteral feeds.  Slowly increase rate.  - 2/17: continuing with tube feeding     9.  Hyperglycemia of acute illness  Glucose levels have been persistently in a range of 140-160.  Not unexpected given critical illness as well as steroid therapy.  Add short acting sliding scale insulin.  Hemoglobin A1c 6.4, not markedly elevated.  Persistent hyperglycemia or zachery diabetes unlikely.  Glucose is likely to increase with initiation of enteral feeds.  - 2/17: BG range 145 - 203.  Continue current insulin regimen    10.  Hyperkalemia  Potassium now at the upper limits of expected.  Transient hyperkalemia earlier in the day today.  Multifactorial related in part to frequent intermittent beta agonist.  CK not elevated.    This patient remains critically ill at substantial risk of life-threatening deterioration. I have been present at the bedside on multiple occasions for serial evaluation as well as directing ongoing therapy including intensive mechanical ventilatory support.  Cumulative critical care time today in evaluation and management exclusive of procedures  55 minutes.    Active Problems:    Influenza A    Acute respiratory failure with hypoxia (H)    Benign essential hypertension    Respiratory failure (H)    Diet: NPO for Medical/Clinical Reasons Except for: No Exceptions  Adult Formula Drip Feeding: Continuous Other; Jevity 1.5; Orogastric tube; Goal Rate: 40; mL/hr; Medication - Feeding Tube Flush Frequency: At least 15-30 mL water before and after medication administration and with tube clogging; No; none    DVT Prophylaxis: Enoxaparin (Lovenox) SQ  Gill Catheter: in place, indication: Strict 1-2 Hour I&O  Code Status: Full Code      Disposition Plan   Expected discharge: pt is still in critical condition; unkown  timeline at this time  Entered: Dio Gonzalez MD 02/17/2020, 1:36 PM       The patient's care was discussed with the Bedside Nurse, Patient and Patient's Family.    Dio Gonzalez MD  Hospitalist Service  [facility]    ______________________________________________________________________    Interval History       Data reviewed today: I reviewed all medications, new labs and imaging results over the last 24 hours. I personally reviewed the chest x-ray image(s) showing ETT 5 cm above nahomy; no obvious infiltrate.    Physical Exam   Vital Signs: Temp: 98.9  F (37.2  C) Temp src: Tympanic BP: 143/87 Pulse: 52 Heart Rate: 62 Resp: 15 SpO2: 95 % O2 Device: Mechanical Ventilator    Weight: 215 lbs 2.7 oz   Patient Vitals for the past 24 hrs:   BP Temp Heart Rate Resp SpO2 Height Weight   02/17/20 1232 -- -- -- -- 95 % -- --   02/17/20 1215 -- -- 62 15 97 % -- --   02/17/20 1211 143/87 98.9  F (37.2  C) -- 16 -- -- --   02/17/20 1200 (!) 152/105 -- 60 20 97 % -- --   02/17/20 1145 -- -- 54 12 97 % -- --   02/17/20 1130 -- -- 54 13 97 % -- --   02/17/20 1121 -- -- -- -- 97 % -- --   02/17/20 1115 -- -- 54 12 97 % -- --   02/17/20 1100 118/75 -- 55 13 96 % -- --   02/17/20 1045 -- -- 55 13 96 % -- --   02/17/20 1030 -- -- 56 15 95 % -- --   02/17/20 1015 -- -- 57 16 94 % -- --   02/17/20 1000 123/77 -- 62 18 93 % -- --   02/17/20 0945 -- -- 73 22 93 % -- --   02/17/20 0930 -- -- 70 (!) 45 100 % -- --   02/17/20 0926 -- -- -- -- 97 % -- --   02/17/20 0915 -- -- 64 18 96 % -- --   02/17/20 0900 107/70 -- 51 13 96 % -- --   02/17/20 0845 -- -- 51 13 96 % -- --   02/17/20 0844 -- -- -- -- -- -- --   02/17/20 0830 -- -- 51 12 96 % -- --   02/17/20 0800 108/68 98.1  F (36.7  C) 58 13 97 % -- --   02/17/20 0745 -- -- 52 14 96 % -- --   02/17/20 0739 -- -- -- -- 96 % -- --   02/17/20 0730 -- -- 52 13 96 % -- --   02/17/20 0715 -- -- 51 12 96 % -- --   02/17/20 0700 100/65 -- 51 13 96 % -- --   02/17/20 0645 -- -- 52 13 96 %  "-- --   02/17/20 0615 -- -- 53 14 95 % -- --   02/17/20 0600 98/65 -- 54 (!) 27 93 % 1.85 m (6' 0.84\") --   02/17/20 0545 -- -- 55 19 92 % -- --   02/17/20 0530 -- -- 55 14 92 % -- --   02/17/20 0515 -- -- 56 14 92 % -- --   02/17/20 0500 126/88 -- 66 (!) 40 93 % -- 97.6 kg (215 lb 2.7 oz)   02/17/20 0445 -- -- 59 (!) 29 95 % -- --   02/17/20 0430 -- -- 68 (!) 37 97 % -- --   02/17/20 0415 -- -- 51 15 96 % -- --   02/17/20 0400 106/68 98.5  F (36.9  C) 53 14 95 % -- --   02/17/20 0345 -- -- 52 14 95 % -- --   02/17/20 0315 -- -- 52 14 94 % -- --   02/17/20 0300 104/68 -- 53 15 94 % -- --   02/17/20 0245 -- -- 52 13 94 % -- --   02/17/20 0230 -- -- 53 14 93 % -- --   02/17/20 0215 -- -- 65 (!) 41 94 % -- --   02/17/20 0200 103/66 -- 53 14 95 % -- --   02/17/20 0145 -- -- 56 16 97 % -- --   02/17/20 0115 -- -- 53 14 97 % -- --   02/17/20 0100 119/79 -- 56 14 97 % -- --   02/17/20 0045 -- -- 53 14 97 % -- --   02/17/20 0015 -- -- 56 15 97 % -- --   02/17/20 0000 102/70 98.3  F (36.8  C) 52 14 96 % -- --   02/16/20 2345 -- -- 54 12 95 % -- --   02/16/20 2330 -- -- 52 17 94 % -- --   02/16/20 2315 -- -- 52 18 94 % -- --   02/16/20 2300 101/65 -- 54 19 93 % -- --   02/16/20 2245 -- -- 54 17 94 % -- --   02/16/20 2230 -- -- 56 17 94 % -- --   02/16/20 2223 -- 99  F (37.2  C) -- -- -- -- --   02/16/20 2215 -- -- 56 25 96 % -- --   02/16/20 2200 111/74 -- 54 18 97 % -- --   02/16/20 2100 107/70 -- 53 13 97 % -- --   02/16/20 2000 107/70 99.3  F (37.4  C) 55 14 96 % -- --   02/16/20 1951 -- -- -- -- 96 % -- --   02/16/20 1900 109/70 -- 54 18 96 % -- --   02/16/20 1800 108/73 -- 55 12 96 % -- --   02/16/20 1700 106/70 -- 54 16 96 % -- --   02/16/20 1600 116/85 98.1  F (36.7  C) 57 14 96 % -- --   02/16/20 1530 105/73 -- 53 15 96 % -- --   02/16/20 1500 108/73 -- 56 14 96 % -- --   02/16/20 1430 124/98 -- 74 (!) 36 93 % -- --     General Appearance: Pt is intubated and sedated  Respiratory: prolonged expiratory phase, no " wheezing or crackles  Cardiovascular: RRR, no gallops or murmurs.  GI: soft, NT ND  Skin: intact  Other: No edema     Data   Recent Labs   Lab 02/17/20  0455 02/16/20  0437 02/15/20  0950 02/15/20  0526 02/14/20  2106 02/14/20  0511   WBC 20.4* 12.2*  --  15.4* 18.9* 14.0*   HGB 13.4 12.7*  --  14.2 16.3 16.3   MCV 98 97  --  98 94 93    167  --  183 233 234    139 138 138 136 139   POTASSIUM 4.8 5.0 5.2 6.2* 4.7 4.2   CHLORIDE 111* 109 110* 110* 104 108   CO2 24 24 22 22 24 25   BUN 32* 39* 36* 32* 27 17   CR 0.88 1.11 1.17 1.05 0.87 0.89   ANIONGAP 6 6 6 6 8 6   CARLTON 8.2* 7.8* 8.0* 7.9* 9.2 8.7   * 171* 155* 149* 184* 111*   ALBUMIN  --  3.0*  --  3.6  --  4.4   PROTTOTAL  --  5.9*  --  6.8  --  8.3   BILITOTAL  --  0.2  --  0.3  --  0.4   ALKPHOS  --  54  --  77  --  94   ALT  --  30  --  34  --  28   AST  --  26  --  25  --  22   TROPI  --   --   --   --  <0.015 <0.015     Recent Results (from the past 24 hour(s))   XR Chest Port 1 View    Narrative    PROCEDURE:  XR CHEST PORT 1 VW    HISTORY:  intubated, respiratory failure.     COMPARISON:  None.    FINDINGS:   The cardiac silhouette is normal in size. The pulmonary vasculature is  normal.  The lingular opacity is again noted without change. There is  an endotracheal tube is tip proximal to 5 cm above the nahomy. There  is a nasogastric tube passing in the stomach. No pleural effusion or  pneumothorax.      Impression    IMPRESSION: Endotracheal tube with its tip approximate 5 cm above the  nahomy    MURTAZA ECHOLS MD     Medications     dextrose       fentaNYL 50 mcg/hr (02/17/20 1209)     lactated ringers 75 mL/hr at 02/17/20 1004     midazolam 5 mg/hr (02/17/20 1411)       cefTRIAXone  1 g Intravenous Q24H     chlorhexidine  15 mL Mouth/Throat Q12H     enoxaparin ANTICOAGULANT  1 mg/kg Subcutaneous Q12H     famotidine  20 mg Intravenous Q12H     HYDROmorphone  0.5 mg Intravenous Q6H     insulin aspart  1-6 Units Subcutaneous Q4H      methylPREDNISolone  125 mg Intravenous Q6H     oseltamivir  75 mg Oral BID

## 2020-02-18 NOTE — PROGRESS NOTES
Spoke with Dr. Guaman, intensivist via Tele-ICU, who reviewed the case.  Chronic elevation of pCO2 is puzzling as pt did not show chronic retention of CO2 in the initial ABGs  Since no evidence of ARDS is seen, increasing ventilation by increasing rate from 12 to 18 would be a reasonable try and keep TV at 650 as long as plateau pressure is less than 25.  Will increase rate to 18 and check ABG in 1 hour.    Dio Gonzalez MD

## 2020-02-19 ENCOUNTER — APPOINTMENT (OUTPATIENT)
Dept: SPEECH THERAPY | Facility: HOSPITAL | Age: 55
DRG: 207 | End: 2020-02-19
Attending: INTERNAL MEDICINE
Payer: COMMERCIAL

## 2020-02-19 ENCOUNTER — APPOINTMENT (OUTPATIENT)
Dept: GENERAL RADIOLOGY | Facility: HOSPITAL | Age: 55
DRG: 207 | End: 2020-02-19
Attending: INTERNAL MEDICINE
Payer: COMMERCIAL

## 2020-02-19 ENCOUNTER — HOSPITAL ENCOUNTER (INPATIENT)
Dept: CARDIOLOGY | Facility: HOSPITAL | Age: 55
DRG: 207 | End: 2020-02-19
Attending: INTERNAL MEDICINE
Payer: COMMERCIAL

## 2020-02-19 LAB
ALBUMIN SERPL-MCNC: 2.6 G/DL (ref 3.4–5)
ALP SERPL-CCNC: 44 U/L (ref 40–150)
ALT SERPL W P-5'-P-CCNC: 25 U/L (ref 0–70)
ANION GAP SERPL CALCULATED.3IONS-SCNC: 6 MMOL/L (ref 3–14)
ANION GAP SERPL CALCULATED.3IONS-SCNC: 6 MMOL/L (ref 3–14)
AST SERPL W P-5'-P-CCNC: 12 U/L (ref 0–45)
BASE EXCESS BLDA CALC-SCNC: 5.6 MMOL/L
BASE EXCESS BLDA CALC-SCNC: 6.1 MMOL/L
BASE EXCESS BLDA CALC-SCNC: 6.9 MMOL/L
BASE EXCESS BLDV CALC-SCNC: 7.1 MMOL/L
BILIRUB DIRECT SERPL-MCNC: <0.1 MG/DL (ref 0–0.2)
BILIRUB SERPL-MCNC: 0.2 MG/DL (ref 0.2–1.3)
BUN SERPL-MCNC: 34 MG/DL (ref 7–30)
BUN SERPL-MCNC: 34 MG/DL (ref 7–30)
CALCIUM SERPL-MCNC: 8.1 MG/DL (ref 8.5–10.1)
CALCIUM SERPL-MCNC: 8.4 MG/DL (ref 8.5–10.1)
CHLORIDE SERPL-SCNC: 110 MMOL/L (ref 94–109)
CHLORIDE SERPL-SCNC: 111 MMOL/L (ref 94–109)
CO2 SERPL-SCNC: 27 MMOL/L (ref 20–32)
CO2 SERPL-SCNC: 27 MMOL/L (ref 20–32)
CREAT SERPL-MCNC: 0.58 MG/DL (ref 0.66–1.25)
CREAT SERPL-MCNC: 0.64 MG/DL (ref 0.66–1.25)
ERYTHROCYTE [DISTWIDTH] IN BLOOD BY AUTOMATED COUNT: 13.5 % (ref 10–15)
GFR SERPL CREATININE-BSD FRML MDRD: >90 ML/MIN/{1.73_M2}
GFR SERPL CREATININE-BSD FRML MDRD: >90 ML/MIN/{1.73_M2}
GLUCOSE BLDC GLUCOMTR-MCNC: 161 MG/DL (ref 70–99)
GLUCOSE BLDC GLUCOMTR-MCNC: 163 MG/DL (ref 70–99)
GLUCOSE BLDC GLUCOMTR-MCNC: 166 MG/DL (ref 70–99)
GLUCOSE BLDC GLUCOMTR-MCNC: 176 MG/DL (ref 70–99)
GLUCOSE BLDC GLUCOMTR-MCNC: 190 MG/DL (ref 70–99)
GLUCOSE BLDC GLUCOMTR-MCNC: 206 MG/DL (ref 70–99)
GLUCOSE SERPL-MCNC: 184 MG/DL (ref 70–99)
GLUCOSE SERPL-MCNC: 191 MG/DL (ref 70–99)
HCO3 BLD-SCNC: 30 MMOL/L (ref 21–28)
HCO3 BLD-SCNC: 30 MMOL/L (ref 21–28)
HCO3 BLD-SCNC: 31 MMOL/L (ref 21–28)
HCO3 BLDV-SCNC: 30 MMOL/L (ref 21–28)
HCT VFR BLD AUTO: 40.1 % (ref 40–53)
HGB BLD-MCNC: 14.4 G/DL (ref 13.3–17.7)
LACTATE BLD-SCNC: 1.8 MMOL/L (ref 0.7–2)
MAGNESIUM SERPL-MCNC: 2.5 MG/DL (ref 1.6–2.3)
MCH RBC QN AUTO: 31.5 PG (ref 26.5–33)
MCHC RBC AUTO-ENTMCNC: 35.9 G/DL (ref 31.5–36.5)
MCV RBC AUTO: 88 FL (ref 78–100)
O2/TOTAL GAS SETTING VFR VENT: 50 %
O2/TOTAL GAS SETTING VFR VENT: ABNORMAL %
OXYHGB MFR BLD: 89 % (ref 92–100)
OXYHGB MFR BLD: 93 % (ref 92–100)
OXYHGB MFR BLD: 98 % (ref 92–100)
OXYHGB MFR BLDV: 98 %
PCO2 BLD: 38 MM HG (ref 35–45)
PCO2 BLD: 39 MM HG (ref 35–45)
PCO2 BLD: 39 MM HG (ref 35–45)
PCO2 BLDV: 37 MM HG (ref 40–50)
PH BLD: 7.48 PH (ref 7.35–7.45)
PH BLD: 7.5 PH (ref 7.35–7.45)
PH BLD: 7.51 PH (ref 7.35–7.45)
PH BLDV: 7.5 PH (ref 7.32–7.43)
PHOSPHATE SERPL-MCNC: 2.4 MG/DL (ref 2.5–4.5)
PLATELET # BLD AUTO: 152 10E9/L (ref 150–450)
PO2 BLD: 121 MM HG (ref 80–105)
PO2 BLD: 54 MM HG (ref 80–105)
PO2 BLD: 69 MM HG (ref 80–105)
PO2 BLDV: 119 MM HG (ref 25–47)
POTASSIUM SERPL-SCNC: 3.5 MMOL/L (ref 3.4–5.3)
POTASSIUM SERPL-SCNC: 4.3 MMOL/L (ref 3.4–5.3)
PROT SERPL-MCNC: 6 G/DL (ref 6.8–8.8)
RBC # BLD AUTO: 4.57 10E12/L (ref 4.4–5.9)
SODIUM SERPL-SCNC: 143 MMOL/L (ref 133–144)
SODIUM SERPL-SCNC: 144 MMOL/L (ref 133–144)
WBC # BLD AUTO: 10.9 10E9/L (ref 4–11)

## 2020-02-19 PROCEDURE — 82248 BILIRUBIN DIRECT: CPT | Performed by: INTERNAL MEDICINE

## 2020-02-19 PROCEDURE — 25000125 ZZHC RX 250: Performed by: INTERNAL MEDICINE

## 2020-02-19 PROCEDURE — 84155 ASSAY OF PROTEIN SERUM: CPT | Performed by: INTERNAL MEDICINE

## 2020-02-19 PROCEDURE — 84075 ASSAY ALKALINE PHOSPHATASE: CPT | Performed by: INTERNAL MEDICINE

## 2020-02-19 PROCEDURE — 40000275 ZZH STATISTIC RCP TIME EA 10 MIN

## 2020-02-19 PROCEDURE — 85027 COMPLETE CBC AUTOMATED: CPT | Performed by: INTERNAL MEDICINE

## 2020-02-19 PROCEDURE — 99292 CRITICAL CARE ADDL 30 MIN: CPT | Performed by: INTERNAL MEDICINE

## 2020-02-19 PROCEDURE — 94003 VENT MGMT INPAT SUBQ DAY: CPT

## 2020-02-19 PROCEDURE — 93306 TTE W/DOPPLER COMPLETE: CPT | Mod: TC

## 2020-02-19 PROCEDURE — 83735 ASSAY OF MAGNESIUM: CPT | Performed by: INTERNAL MEDICINE

## 2020-02-19 PROCEDURE — 00000146 ZZHCL STATISTIC GLUCOSE BY METER IP

## 2020-02-19 PROCEDURE — 25000132 ZZH RX MED GY IP 250 OP 250 PS 637: Performed by: INTERNAL MEDICINE

## 2020-02-19 PROCEDURE — 99291 CRITICAL CARE FIRST HOUR: CPT | Performed by: INTERNAL MEDICINE

## 2020-02-19 PROCEDURE — 36415 COLL VENOUS BLD VENIPUNCTURE: CPT | Performed by: INTERNAL MEDICINE

## 2020-02-19 PROCEDURE — 84450 TRANSFERASE (AST) (SGOT): CPT | Performed by: INTERNAL MEDICINE

## 2020-02-19 PROCEDURE — 25800030 ZZH RX IP 258 OP 636: Performed by: INTERNAL MEDICINE

## 2020-02-19 PROCEDURE — 40000996 ZZC STATISTIC SLP OP VISIT

## 2020-02-19 PROCEDURE — 25000128 H RX IP 250 OP 636: Performed by: INTERNAL MEDICINE

## 2020-02-19 PROCEDURE — 82805 BLOOD GASES W/O2 SATURATION: CPT | Performed by: INTERNAL MEDICINE

## 2020-02-19 PROCEDURE — 80048 BASIC METABOLIC PNL TOTAL CA: CPT | Performed by: INTERNAL MEDICINE

## 2020-02-19 PROCEDURE — 80069 RENAL FUNCTION PANEL: CPT | Performed by: INTERNAL MEDICINE

## 2020-02-19 PROCEDURE — 83605 ASSAY OF LACTIC ACID: CPT | Performed by: INTERNAL MEDICINE

## 2020-02-19 PROCEDURE — 82247 BILIRUBIN TOTAL: CPT | Performed by: INTERNAL MEDICINE

## 2020-02-19 PROCEDURE — 93306 TTE W/DOPPLER COMPLETE: CPT | Mod: 26 | Performed by: INTERNAL MEDICINE

## 2020-02-19 PROCEDURE — 94640 AIRWAY INHALATION TREATMENT: CPT | Mod: 76

## 2020-02-19 PROCEDURE — 71045 X-RAY EXAM CHEST 1 VIEW: CPT | Mod: TC

## 2020-02-19 PROCEDURE — 20000003 ZZH R&B ICU

## 2020-02-19 PROCEDURE — 94660 CPAP INITIATION&MGMT: CPT

## 2020-02-19 PROCEDURE — 36600 WITHDRAWAL OF ARTERIAL BLOOD: CPT

## 2020-02-19 PROCEDURE — 94640 AIRWAY INHALATION TREATMENT: CPT

## 2020-02-19 PROCEDURE — 84460 ALANINE AMINO (ALT) (SGPT): CPT | Performed by: INTERNAL MEDICINE

## 2020-02-19 RX ORDER — METOPROLOL TARTRATE 1 MG/ML
5 INJECTION, SOLUTION INTRAVENOUS EVERY 4 HOURS PRN
Status: DISCONTINUED | OUTPATIENT
Start: 2020-02-19 | End: 2020-02-19

## 2020-02-19 RX ORDER — PANTOPRAZOLE SODIUM 40 MG/1
40 TABLET, DELAYED RELEASE ORAL
Status: DISCONTINUED | OUTPATIENT
Start: 2020-02-20 | End: 2020-02-27 | Stop reason: HOSPADM

## 2020-02-19 RX ORDER — LORAZEPAM 2 MG/ML
0.25 INJECTION INTRAMUSCULAR EVERY 4 HOURS
Status: DISCONTINUED | OUTPATIENT
Start: 2020-02-19 | End: 2020-02-19

## 2020-02-19 RX ORDER — METOPROLOL TARTRATE 1 MG/ML
5 INJECTION, SOLUTION INTRAVENOUS EVERY 6 HOURS
Status: DISCONTINUED | OUTPATIENT
Start: 2020-02-19 | End: 2020-02-20

## 2020-02-19 RX ORDER — LORAZEPAM 2 MG/ML
.5-1 INJECTION INTRAMUSCULAR EVERY 4 HOURS PRN
Status: DISCONTINUED | OUTPATIENT
Start: 2020-02-19 | End: 2020-02-20

## 2020-02-19 RX ORDER — ESMOLOL HYDROCHLORIDE 20 MG/ML
50-300 INJECTION, SOLUTION INTRAVENOUS CONTINUOUS
Status: DISCONTINUED | OUTPATIENT
Start: 2020-02-19 | End: 2020-02-19

## 2020-02-19 RX ORDER — LORAZEPAM 2 MG/ML
0.25 INJECTION INTRAMUSCULAR EVERY 4 HOURS PRN
Status: DISCONTINUED | OUTPATIENT
Start: 2020-02-19 | End: 2020-02-19

## 2020-02-19 RX ORDER — DEXMEDETOMIDINE HYDROCHLORIDE 4 UG/ML
0.2-0.7 INJECTION, SOLUTION INTRAVENOUS CONTINUOUS
Status: DISCONTINUED | OUTPATIENT
Start: 2020-02-19 | End: 2020-02-20

## 2020-02-19 RX ADMIN — PROPOFOL 25 MCG/KG/MIN: 10 INJECTION, EMULSION INTRAVENOUS at 00:05

## 2020-02-19 RX ADMIN — INSULIN ASPART 1 UNITS: 100 INJECTION, SOLUTION INTRAVENOUS; SUBCUTANEOUS at 16:46

## 2020-02-19 RX ADMIN — HYDRALAZINE HYDROCHLORIDE 10 MG: 20 INJECTION INTRAMUSCULAR; INTRAVENOUS at 08:18

## 2020-02-19 RX ADMIN — LORAZEPAM 0.25 MG: 2 INJECTION, SOLUTION INTRAMUSCULAR; INTRAVENOUS at 17:16

## 2020-02-19 RX ADMIN — ENOXAPARIN SODIUM 100 MG: 100 INJECTION SUBCUTANEOUS at 17:18

## 2020-02-19 RX ADMIN — ALBUTEROL SULFATE 2.5 MG: 2.5 SOLUTION RESPIRATORY (INHALATION) at 09:41

## 2020-02-19 RX ADMIN — IPRATROPIUM BROMIDE AND ALBUTEROL SULFATE 3 ML: .5; 3 SOLUTION RESPIRATORY (INHALATION) at 15:27

## 2020-02-19 RX ADMIN — AMLODIPINE BESYLATE 10 MG: 5 TABLET ORAL at 07:37

## 2020-02-19 RX ADMIN — HYDROMORPHONE HYDROCHLORIDE 0.5 MG: 1 INJECTION, SOLUTION INTRAMUSCULAR; INTRAVENOUS; SUBCUTANEOUS at 08:20

## 2020-02-19 RX ADMIN — LORAZEPAM 0.25 MG: 2 INJECTION, SOLUTION INTRAMUSCULAR; INTRAVENOUS at 10:39

## 2020-02-19 RX ADMIN — NICOTINE 1 PATCH: 21 PATCH, EXTENDED RELEASE TRANSDERMAL at 22:37

## 2020-02-19 RX ADMIN — METOPROLOL TARTRATE 5 MG: 1 INJECTION, SOLUTION INTRAVENOUS at 22:57

## 2020-02-19 RX ADMIN — INSULIN ASPART 2 UNITS: 100 INJECTION, SOLUTION INTRAVENOUS; SUBCUTANEOUS at 00:44

## 2020-02-19 RX ADMIN — INSULIN ASPART 1 UNITS: 100 INJECTION, SOLUTION INTRAVENOUS; SUBCUTANEOUS at 12:21

## 2020-02-19 RX ADMIN — HYDROMORPHONE HYDROCHLORIDE 0.5 MG: 1 INJECTION, SOLUTION INTRAMUSCULAR; INTRAVENOUS; SUBCUTANEOUS at 11:55

## 2020-02-19 RX ADMIN — DOXYCYCLINE 100 MG: 100 INJECTION, POWDER, LYOPHILIZED, FOR SOLUTION INTRAVENOUS at 08:04

## 2020-02-19 RX ADMIN — INSULIN ASPART 1 UNITS: 100 INJECTION, SOLUTION INTRAVENOUS; SUBCUTANEOUS at 04:23

## 2020-02-19 RX ADMIN — INSULIN ASPART 2 UNITS: 100 INJECTION, SOLUTION INTRAVENOUS; SUBCUTANEOUS at 08:29

## 2020-02-19 RX ADMIN — METOPROLOL TARTRATE 5 MG: 1 INJECTION, SOLUTION INTRAVENOUS at 17:17

## 2020-02-19 RX ADMIN — METHYLPREDNISOLONE SODIUM SUCCINATE 62.5 MG: 125 INJECTION, POWDER, FOR SOLUTION INTRAMUSCULAR; INTRAVENOUS at 09:12

## 2020-02-19 RX ADMIN — METHYLPREDNISOLONE SODIUM SUCCINATE 62.5 MG: 125 INJECTION, POWDER, FOR SOLUTION INTRAMUSCULAR; INTRAVENOUS at 00:46

## 2020-02-19 RX ADMIN — SODIUM CHLORIDE, POTASSIUM CHLORIDE, SODIUM LACTATE AND CALCIUM CHLORIDE: 600; 310; 30; 20 INJECTION, SOLUTION INTRAVENOUS at 07:52

## 2020-02-19 RX ADMIN — CEFTRIAXONE SODIUM 1 G: 1 INJECTION, SOLUTION INTRAVENOUS at 21:24

## 2020-02-19 RX ADMIN — Medication 50 MCG/HR: at 04:47

## 2020-02-19 RX ADMIN — METHYLPREDNISOLONE SODIUM SUCCINATE 62.5 MG: 125 INJECTION, POWDER, FOR SOLUTION INTRAMUSCULAR; INTRAVENOUS at 16:37

## 2020-02-19 RX ADMIN — DOXYCYCLINE 100 MG: 100 INJECTION, POWDER, LYOPHILIZED, FOR SOLUTION INTRAVENOUS at 19:32

## 2020-02-19 RX ADMIN — DEXMEDETOMIDINE HYDROCHLORIDE 0.2 MCG/KG/HR: 4 INJECTION, SOLUTION INTRAVENOUS at 11:36

## 2020-02-19 RX ADMIN — HYDRALAZINE HYDROCHLORIDE 10 MG: 20 INJECTION INTRAMUSCULAR; INTRAVENOUS at 13:44

## 2020-02-19 RX ADMIN — HYDROMORPHONE HYDROCHLORIDE 0.5 MG: 1 INJECTION, SOLUTION INTRAMUSCULAR; INTRAVENOUS; SUBCUTANEOUS at 03:07

## 2020-02-19 RX ADMIN — HYDRALAZINE HYDROCHLORIDE 10 MG: 20 INJECTION INTRAMUSCULAR; INTRAVENOUS at 03:06

## 2020-02-19 RX ADMIN — CHLORHEXIDINE GLUCONATE ORAL RINSE 15 ML: 1.2 SOLUTION DENTAL at 08:02

## 2020-02-19 RX ADMIN — LORAZEPAM 0.5 MG: 2 INJECTION, SOLUTION INTRAMUSCULAR; INTRAVENOUS at 21:16

## 2020-02-19 RX ADMIN — INSULIN ASPART 1 UNITS: 100 INJECTION, SOLUTION INTRAVENOUS; SUBCUTANEOUS at 19:53

## 2020-02-19 RX ADMIN — IPRATROPIUM BROMIDE AND ALBUTEROL SULFATE 3 ML: .5; 3 SOLUTION RESPIRATORY (INHALATION) at 11:17

## 2020-02-19 RX ADMIN — DEXMEDETOMIDINE HYDROCHLORIDE 0.5 MCG/KG/HR: 4 INJECTION, SOLUTION INTRAVENOUS at 19:45

## 2020-02-19 RX ADMIN — ENOXAPARIN SODIUM 100 MG: 100 INJECTION SUBCUTANEOUS at 05:33

## 2020-02-19 RX ADMIN — HYDROMORPHONE HYDROCHLORIDE 0.5 MG: 1 INJECTION, SOLUTION INTRAMUSCULAR; INTRAVENOUS; SUBCUTANEOUS at 23:56

## 2020-02-19 RX ADMIN — HYDRALAZINE HYDROCHLORIDE 10 MG: 20 INJECTION INTRAMUSCULAR; INTRAVENOUS at 10:49

## 2020-02-19 RX ADMIN — IPRATROPIUM BROMIDE AND ALBUTEROL SULFATE 3 ML: .5; 3 SOLUTION RESPIRATORY (INHALATION) at 19:17

## 2020-02-19 RX ADMIN — PROPOFOL 25 MCG/KG/MIN: 10 INJECTION, EMULSION INTRAVENOUS at 05:43

## 2020-02-19 RX ADMIN — METOPROLOL TARTRATE 5 MG: 1 INJECTION, SOLUTION INTRAVENOUS at 10:25

## 2020-02-19 RX ADMIN — IPRATROPIUM BROMIDE AND ALBUTEROL SULFATE 3 ML: .5; 3 SOLUTION RESPIRATORY (INHALATION) at 07:13

## 2020-02-19 RX ADMIN — DEXMEDETOMIDINE HYDROCHLORIDE 0.7 MCG/KG/HR: 4 INJECTION, SOLUTION INTRAVENOUS at 01:53

## 2020-02-19 RX ADMIN — OSELTAMIVIR PHOSPHATE 75 MG: 75 CAPSULE ORAL at 09:17

## 2020-02-19 ASSESSMENT — ACTIVITIES OF DAILY LIVING (ADL)
ADLS_ACUITY_SCORE: 21
ADLS_ACUITY_SCORE: 20
ADLS_ACUITY_SCORE: 20
ADLS_ACUITY_SCORE: 21

## 2020-02-19 ASSESSMENT — MIFFLIN-ST. JEOR: SCORE: 1840.26

## 2020-02-19 NOTE — PLAN OF CARE
Spontaneous sedation vacation attempted at 1125am. Fentanyl and Versed were stopped prior at 1014. At approx 1208, pt's wife stated he was able to relay that he felt as though he as suffocating. Pt also shook head that he felt anxious.  Dr Gonzalez notified and at bedside. BP's 200/100's, tachycardic, red in the face, biting tube, peak volume on vent in the 50's. Total of 2.25mg IV ativan given without result. Ordered to resume versed and fentanyl gtt. After minutes on the above without result, ordered to begin propofol gtt. Versed was discontinued by MD, precidex started. Currently at max precidex 0.7mcg/kg/hr, fentanyl at 50mcg/hr, propofol at 30mcg/kg/hour (d/t patient opening eyes and facial grimacing). Currently resting with eyes closed and in no distress. An additional NSL placed in left forearm. PRN fentanyl and dilaudid used when repositioning/oral cares with good results. [facility] paperwork filled out for pts wife by Dr. Gonzalez. Left with oncoming RN.     Face to face report given with opportunity to observe patient.    Report given to [place]/Estefany Edmondson RN   2/18/2020  7:50 PM

## 2020-02-19 NOTE — PLAN OF CARE
Medicated with 0.25mg ativan IV now for blood pressure and metoprolol 5 mg IV. Took a sip of water and chokes.  Oxygen sats 97%.

## 2020-02-19 NOTE — PLAN OF CARE
Text messaged Dr. Gonzalez that blood pressure remains elevated after giving ativan and hydralazine IV.

## 2020-02-19 NOTE — PLAN OF CARE
Notified Dr. John of change in color of secretions from OG to be more red than previous assessment and that placement from 60 to 55. MD at bedside. OG advanced to 60.

## 2020-02-19 NOTE — PROGRESS NOTES
[facility]    Medicine Progress Note - Hospitalist Service       Date of Admission:  2/14/2020  Assessment & Plan   #1 acute respiratory failure with hypoxia and hypercapnia, mechanical ventilatory support, influenza A infection with possible bacterial superimposed infection, COPD exacerbation, possible pulmonary embolism  The case was discussed with Dr. Dukes, telemetry ICU intensivist this morning.  Patient's ABG on vent support this morning showed adequate ventilation and oxygenation on 35% FiO2.  Patient tolerated weaning trial; and he was successfully extubated.  Postextubation, patient's breathing appeared labored; BiPAP support was administered and with a light sedation patient is tolerating it well.  We will repeat blood gas to assess patient's respiratory status on BiPAP.  Patient is on doxycycline and ceftriaxone for possible bacterial pneumonia as well as Tamiflu for influenza A pneumonitis.  Patient's respiratory difficulty probably is due to underlying COPD, and for this we are continuing with bronchodilators, IV steroid, supplemental oxygen, and BiPAP.  Possible pulmonary embolism may be contributing to his significant hypoxia.  We will continue with treatment dose enoxaparin and will try to confirm the presence of a pulmonary embolism by repeating chest CT angiogram when patient's respiratory status is a little more stable.    #2 history of tobacco abuse, COPD  Patient has at least 70-pack-year history of tobacco abuse; we will start nicotine replacement now that he is extubated.    #3 hyperkalemia  Potassium level stable today    #4 nutritional support  Now the patient is extubated, we will have swallowing eval and resume his diet.    5.  Lung nodule  CT 12/12/2016 was obtained by Dr. Garland.  Appears the patient had a plain radiograph initially which demonstrated some nodular findings, possibly requested in the context of the patient's smoking history and dyspnea.  The patient was  told, based on [facility] records, that the CT requested subsequently was suggestive of atelectasis and that a follow-up study would be indicated.  Appears this may have been delayed because of financial issues.  Report of the 12/12/2016 CT indicates nodule suggestive of atelectasis in left upper lobe abutting the major fissure.  History indicates a subcentimeter nodule.  Dimensions are not noted in CT report.  Current study shows what is likely to be a similar appearing nodular area of decreased attenuation at the major fissure.  It is most prominent in the inferior lingula abutting the heart border with what are likely to be continuous nodular areas of decreased attenuation more cephalad in the right upper lobe.  Repeated plain radiographs over the course of the last several days does show some variability in the appearance of this area of nodular infiltrate, again suggestive of compressive atelectasis.  In the context of his significant secretions he is at risk of mucoid impaction.  Certainly follow-up in the near future as was recommended previously would be indicated.      Diet: NPO for Medical/Clinical Reasons Except for: No Exceptions    DVT Prophylaxis: Enoxaparin (Lovenox) SQ  Gill Catheter: in place, indication: Strict 1-2 Hour I&O  Code Status: Full Code      Disposition Plan   Expected discharge: 2 - 3 days, recommended to prior living arrangement once antibiotic plan established, O2 use less than 0 liters/minute and stable respiratory status.  Entered: Dio Gonzalez MD 02/19/2020, 8:38 AM     The patient's care was discussed with the Bedside Nurse, Patient, Patient's Family and Dr. Vu, critical care Consultant.    Critical care time 80 minutes    Dio Gonzalez MD  Hospitalist Service  [facility]  ______________________________________________________________________    Interval History   Patient was extubated this morning.  He continued to have labored breathing and BiPAP support was  started at 12/6.  Patient continued to be anxious; Precedex IV drip was resumed.  Currently patient is working with the BiPAP and not fighting.  Echocardiogram was performed this morning    Data reviewed today: I reviewed all medications, new labs and imaging results over the last 24 hours. I personally reviewed the chest x-ray image(s) showing Elevated left hemidiaphragm with increasing volume loss at the left lung base.    Physical Exam   Vital Signs: Temp: 97.5  F (36.4  C) Temp src: Tympanic BP: 157/94 Pulse: 53 Heart Rate: 60 Resp: 18 SpO2: 96 % O2 Device: Mechanical Ventilator    Weight: 211 lbs 6.74 oz  General Appearance: On BiPAP  Respiratory: Mild wheezing bilaterally, minimal crackles at bases  Cardiovascular: S1-S2, tachycardic, no gallops, rubs or murmur appreciated  GI: Soft, nontender nondistended  Skin: Intact  Other: Minimal edema in all 4 extremities    Data   Recent Labs   Lab 02/19/20  0441 02/18/20  1258 02/18/20  0448 02/17/20  1508  02/14/20  2106 02/14/20  0511   WBC 10.9  --  12.8* 17.5*   < > 18.9* 14.0*   HGB 14.4  --  13.0* 13.0*   < > 16.3 16.3   MCV 88  --  98 98   < > 94 93     --  156 165   < > 233 234    142 143 142   < > 136 139   POTASSIUM 4.3 4.5 4.9 5.1   < > 4.7 4.2   CHLORIDE 111* 109 112* 114*   < > 104 108   CO2 27 26 27 27   < > 24 25   BUN 34* 35* 34* 32*   < > 27 17   CR 0.64* 0.73 0.77 0.86   < > 0.87 0.89   ANIONGAP 6 7 4 1*   < > 8 6   CARLTON 8.1* 7.7* 8.1* 8.2*   < > 9.2 8.7   * 201* 160* 172*   < > 184* 111*   ALBUMIN 2.6*  --  2.6* 2.7*   < >  --  4.4   PROTTOTAL 6.0*  --  6.1* 5.9*   < >  --  8.3   BILITOTAL 0.2  --  0.2 0.2   < >  --  0.4   ALKPHOS 44  --  46 47   < >  --  94   ALT 25  --  27 26   < >  --  28   AST 12  --  14 19   < >  --  22   TROPI  --   --   --   --   --  <0.015 <0.015    < > = values in this interval not displayed.     Recent Results (from the past 24 hour(s))   Echocardiogram Complete    Narrative     [id number]  [id number]  [id number]  [id number]^TONY^LIDYA^WILLIAM           [facility]  Echocardiography Laboratory  [address] [place] [id number]     Name: AUGUSTUS QUINTERO  MRN: [id number]  : 1965  Study Date: 2020 05:36 AM  Age: 54 yrs  Gender: Male  Patient Location: [facility]  Reason For Study: CHF, Pulmonary Embolism  History: Pulmonary Embolus  Ordering Physician: LIDYA JIMENEZ  Referring Physician: LIDYA JIMENEZ  Performed By: Ynes Servin     BSA: 0.36 m2  Height: 6 in  Weight: 210 lb  _____________________________________________________________________________  __        Procedure  Technically difficult study. Pt intubated o ventiator. Done supine.  _____________________________________________________________________________  __        Interpretation Summary  Technically difficult study.  Pt intubated o ventiator. Done supine.  No pericardial effusion is present.  Global and regional left ventricular function is normal with an EF of 55-60%.  Grade I or early diastolic dysfunction.  The right ventricle is normal size.  Global right ventricular function is normal.  Mild left atrial enlargement is present.  Trace to mild mitral insufficiency is present.  Aortic valve is normal in structure and function.  Trace to mild tricuspid insufficiency is present.  The peak velocity of the tricuspid regurgitant jet is not obtainable.  The pulmonic valve cannot be assessed.  _____________________________________________________________________________  __        Left Ventricle  Global and regional left ventricular function is normal with an EF of 55-60%.  Grade I or early diastolic dysfunction.     Right Ventricle  The right ventricle is normal size. Global right ventricular function is  normal.     Atria  Mild left atrial enlargement is present.     Mitral Valve  Trace to mild mitral insufficiency is present.     Aortic Valve  Aortic valve is normal in structure and function. The mean AoV  pressure  gradient is 2.2 mmHg.        Tricuspid Valve  Trace to mild tricuspid insufficiency is present. The peak velocity of the  tricuspid regurgitant jet is not obtainable.     Pulmonic Valve  The pulmonic valve cannot be assessed.     Pericardium  No pericardial effusion is present.  _____________________________________________________________________________  __     MMode/2D Measurements & Calculations  IVSd: 0.94 cm  IVSs: 1.7 cm  LVIDd: 5.7 cm  LVIDs: 3.7 cm  LVPWd: 0.94 cm  LVPWs: 1.6 cm  FS: 34.3 %  LV mass(C)d: 206.6 grams  LV mass(C)dI: 575.4 grams/m2  LV mass(C)sI: 682.4 grams/m2  Ao root diam: 4.0 cm  LA dimension: 4.6 cm  LA/Ao: 1.1  LVOT diam: 2.4 cm  LVOT area: 4.4 cm2  RWT: 0.33           Doppler Measurements & Calculations  MV E max edmund: 75.2 cm/sec  MV A max edmund: 104.9 cm/sec  MV E/A: 0.72  MV dec slope: 288.8 cm/sec2  MV dec time: 0.26 sec  Ao V2 max: 100.5 cm/sec  Ao max P.0 mmHg  Ao V2 mean: 68.3 cm/sec  Ao mean P.2 mmHg  Ao V2 VTI: 25.7 cm  LORRAINE(I,D): 3.9 cm2  LORRAINE(V,D): 3.8 cm2  LV V1 max PG: 3.0 mmHg  LV V1 max: 86.5 cm/sec  LV V1 VTI: 23.0 cm  CO(LVOT): 15.8 l/min  CI(LVOT): 44.1 l/min/m2  SV(LVOT): 100.4 ml  SI(LVOT): 279.7 ml/m2  AV Edmund Ratio (DI): 0.86  LORRAINE Index (cm2/m2): 10.9  E/E': 8.6  Peak E' Edmund: 8.7 cm/sec     _____________________________________________________________________________  __           Report approved by: Anabel Castillo 2020 06:49 AM      XR Chest Port 1 View    Narrative    PROCEDURE:  XR CHEST PORT 1 VW    HISTORY:  intubated for respiratory failure.     COMPARISON:  None.    FINDINGS:   The cardiac silhouette is normal in size. The pulmonary vasculature is  normal.  There is elevation left hemidiaphragm with some increased  density at the left lung base which is worsened from previous  examination most likely the basis of atelectasis. There is an  endotracheal tube with its tip approximately 4 cm above the nahomy.  This nasogastric tube  passing in the stomach. No pleural effusion or  pneumothorax.      Impression    IMPRESSION:  Elevation left hemidiaphragm with increasing volume loss  at the left lung base      MURTAZA ECHOLS MD     Medications     dexmedetomidine 0.5 mcg/kg/hr (02/19/20 1347)     dextrose       lactated ringers 75 mL/hr at 02/19/20 0752       amLODIPine  10 mg Oral Daily     cefTRIAXone  1 g Intravenous Q24H     cloNIDine   Transdermal Q8H     cloNIDine  1 patch Transdermal Weekly     doxycycline  intermittent infusion  100 mg Intravenous Q12H     enoxaparin ANTICOAGULANT  1 mg/kg Subcutaneous Q12H     insulin aspart  1-6 Units Subcutaneous Q4H     ipratropium - albuterol 0.5 mg/2.5 mg/3 mL  3 mL Nebulization 4x daily     methylPREDNISolone  62.5 mg Intravenous Q8H     nicotine   Transdermal Q8H     oseltamivir  75 mg Oral BID     [START ON 2/20/2020] pantoprazole  40 mg Oral QAM AC

## 2020-02-19 NOTE — PLAN OF CARE
Pt remains intubated. Rate 18 Tv 650 Peep 5 Fio2 35% HR sinus Rolando 50's. Afebrile. Bp elevated 180s/100s receiving 10 mg hydralazine Q3hrs as needed. Tolerated well. Lungs clear bilaterally. Inline suction preformed with no secretion. But did have thick oral secretions. OG put out 400ml of brown fluid.Propofol titrated as needed for repositioning also receiving fentanyl and precedex. Oral cares and repo. Q 2hrs. adequate urine output.     Face to face report given with opportunity to observe patient.    Report given to Sathya Grant RN   2/19/2020  7:25 AM

## 2020-02-19 NOTE — PLAN OF CARE
Able to extubate patient successfully this morning at 0825. Blood pressure has been elevated but coming down slightly. Precedex started this morning to help with blood pressures and breathing. Hydralazine adm a couple of times today but no big significant changes noted in blood pressures or with metoprolol given. Complains of some left hip pain at times with dilaudid adm.and good pain relief obtained. BBS are clear with occasional wheeze noted in bases. Wife at bedside. Urine output has been adequate. Foam dressing noted on right thigh up by buttocks. Remains clean dry and intact. Placed on bi-pap and patient is tolerating. Feels breathing is easier on bi-pap instead of cannula. Skin color pale. Remains easy to rouse. Alert.  Heart rate was tachy but now coming down to the 70-80's.Will continue to  monitor.

## 2020-02-19 NOTE — PLAN OF CARE
Right wrist, Left wrist and soft restraints continued 2/19/2020    Clinical Justification: Pulling lines, pulling tubes, and pulling equipment  Less Restrictive Alternative: Repositioning, Pain management  Attending Physician Notified: MD ordered restraint, Attending Physician's Name: Dr. John   New orders placed No (current order valid)  Length of Order: 1 Day      Estefany Grant RN

## 2020-02-19 NOTE — PLAN OF CARE
Face to face report given with opportunity to observe patient.    Report given to Yuly Clarke RN   2/19/2020  2:58 PM

## 2020-02-19 NOTE — PROGRESS NOTES
Patient weaned from sedation.  Following commands, breathing on own in spontaneous breathing mode.  Tidal volumes adequate.  RR 26-30, with some anxiety with being awake with ETT in.  Extubated to 4lpm NC with ease.  Lungs slightly wheezy.  Will continue to monitor.

## 2020-02-19 NOTE — PROGRESS NOTES
Patient's ABG shows good ventilation and oxygenation on BiPAP.  We will continue with this overnight and reassess his status tomorrow morning.    Dio Gonzalez MD

## 2020-02-19 NOTE — PLAN OF CARE
Medical Healing Restraints      Reason for Restraint being discontinued: no longer intubated  Time Restraint discontinued:0830  Care Plan : Yes

## 2020-02-19 NOTE — PLAN OF CARE
Right wrist and Left wrist restraints continued 2/18/2020    Clinical Justification: Pulling lines, pulling tubes, and pulling equipment  Less Restrictive Alternative: 1:1 patient care, Pain management  Attending Physician Notified: MD ordered restraint, Attending Physician's Name: Dr. John   New orders placed Yes  Length of Order: 1 Day      Mae Edmondson RN

## 2020-02-19 NOTE — PLAN OF CARE
Respiratory paged now. Patient complains of being short of breath. Respirations are 19 with sats 91% on 3L .

## 2020-02-19 NOTE — PLAN OF CARE
SLP attempted to see patient for swallow evaluation due to extubation and weakness.  Patient required BiPAP following positioning from BM.  Patient still on BiPAP when SLP reattempted evaluation.  SLP to follow up tomorrow as patient projected o be on BiPAP for longer duration.

## 2020-02-19 NOTE — PLAN OF CARE
Text messsage sent now to Dr Gonzalez regarding mouth breathing and increased blood pressure. RT in with patient now drawing ABG's

## 2020-02-19 NOTE — PLAN OF CARE
PT order acknowledged. After chart review and consultation with nursing, patient was deemed medically unfit to be evaluated by PT today - per nursing, BP stabilization was still an issues and patient was currently on BiPap. Nursing expressed he may benefit from PROM, however any amount of rolling or attempts to mobilize patient made him unstable. Will hold and consult with physician tomorrow

## 2020-02-20 ENCOUNTER — APPOINTMENT (OUTPATIENT)
Dept: SPEECH THERAPY | Facility: HOSPITAL | Age: 55
DRG: 207 | End: 2020-02-20
Payer: COMMERCIAL

## 2020-02-20 LAB
ALBUMIN SERPL-MCNC: 2.7 G/DL (ref 3.4–5)
ALP SERPL-CCNC: 45 U/L (ref 40–150)
ALT SERPL W P-5'-P-CCNC: 30 U/L (ref 0–70)
ANION GAP SERPL CALCULATED.3IONS-SCNC: 4 MMOL/L (ref 3–14)
AST SERPL W P-5'-P-CCNC: 29 U/L (ref 0–45)
BACTERIA SPEC CULT: NORMAL
BACTERIA SPEC CULT: NORMAL
BILIRUB DIRECT SERPL-MCNC: 0.2 MG/DL (ref 0–0.2)
BILIRUB SERPL-MCNC: 0.6 MG/DL (ref 0.2–1.3)
BUN SERPL-MCNC: 33 MG/DL (ref 7–30)
CALCIUM SERPL-MCNC: 8.2 MG/DL (ref 8.5–10.1)
CHLORIDE SERPL-SCNC: 110 MMOL/L (ref 94–109)
CO2 SERPL-SCNC: 29 MMOL/L (ref 20–32)
CREAT SERPL-MCNC: 0.59 MG/DL (ref 0.66–1.25)
ERYTHROCYTE [DISTWIDTH] IN BLOOD BY AUTOMATED COUNT: 13.8 % (ref 10–15)
GFR SERPL CREATININE-BSD FRML MDRD: >90 ML/MIN/{1.73_M2}
GLUCOSE BLDC GLUCOMTR-MCNC: 129 MG/DL (ref 70–99)
GLUCOSE BLDC GLUCOMTR-MCNC: 145 MG/DL (ref 70–99)
GLUCOSE BLDC GLUCOMTR-MCNC: 146 MG/DL (ref 70–99)
GLUCOSE BLDC GLUCOMTR-MCNC: 147 MG/DL (ref 70–99)
GLUCOSE BLDC GLUCOMTR-MCNC: 156 MG/DL (ref 70–99)
GLUCOSE SERPL-MCNC: 175 MG/DL (ref 70–99)
HCT VFR BLD AUTO: 41.4 % (ref 40–53)
HGB BLD-MCNC: 13.9 G/DL (ref 13.3–17.7)
LACTATE BLD-SCNC: 1.2 MMOL/L (ref 0.7–2)
MAGNESIUM SERPL-MCNC: 2.4 MG/DL (ref 1.6–2.3)
MCH RBC QN AUTO: 31.3 PG (ref 26.5–33)
MCHC RBC AUTO-ENTMCNC: 33.6 G/DL (ref 31.5–36.5)
MCV RBC AUTO: 93 FL (ref 78–100)
PHOSPHATE SERPL-MCNC: 2.2 MG/DL (ref 2.5–4.5)
PLATELET # BLD AUTO: 152 10E9/L (ref 150–450)
POTASSIUM SERPL-SCNC: 3.8 MMOL/L (ref 3.4–5.3)
PROT SERPL-MCNC: 5.9 G/DL (ref 6.8–8.8)
RBC # BLD AUTO: 4.44 10E12/L (ref 4.4–5.9)
SODIUM SERPL-SCNC: 143 MMOL/L (ref 133–144)
SPECIMEN SOURCE: NORMAL
SPECIMEN SOURCE: NORMAL
WBC # BLD AUTO: 9.5 10E9/L (ref 4–11)

## 2020-02-20 PROCEDURE — 25000132 ZZH RX MED GY IP 250 OP 250 PS 637: Performed by: INTERNAL MEDICINE

## 2020-02-20 PROCEDURE — 84460 ALANINE AMINO (ALT) (SGPT): CPT | Performed by: INTERNAL MEDICINE

## 2020-02-20 PROCEDURE — 25800030 ZZH RX IP 258 OP 636: Performed by: INTERNAL MEDICINE

## 2020-02-20 PROCEDURE — 85027 COMPLETE CBC AUTOMATED: CPT | Performed by: INTERNAL MEDICINE

## 2020-02-20 PROCEDURE — 94660 CPAP INITIATION&MGMT: CPT

## 2020-02-20 PROCEDURE — 25000125 ZZHC RX 250: Performed by: INTERNAL MEDICINE

## 2020-02-20 PROCEDURE — 36415 COLL VENOUS BLD VENIPUNCTURE: CPT | Performed by: INTERNAL MEDICINE

## 2020-02-20 PROCEDURE — 80069 RENAL FUNCTION PANEL: CPT | Performed by: INTERNAL MEDICINE

## 2020-02-20 PROCEDURE — 25000128 H RX IP 250 OP 636

## 2020-02-20 PROCEDURE — 25000128 H RX IP 250 OP 636: Performed by: INTERNAL MEDICINE

## 2020-02-20 PROCEDURE — 82248 BILIRUBIN DIRECT: CPT | Performed by: INTERNAL MEDICINE

## 2020-02-20 PROCEDURE — 82247 BILIRUBIN TOTAL: CPT | Performed by: INTERNAL MEDICINE

## 2020-02-20 PROCEDURE — 20000003 ZZH R&B ICU

## 2020-02-20 PROCEDURE — 99232 SBSQ HOSP IP/OBS MODERATE 35: CPT | Performed by: INTERNAL MEDICINE

## 2020-02-20 PROCEDURE — 83605 ASSAY OF LACTIC ACID: CPT | Performed by: INTERNAL MEDICINE

## 2020-02-20 PROCEDURE — 40000996 ZZC STATISTIC SLP OP VISIT

## 2020-02-20 PROCEDURE — 84155 ASSAY OF PROTEIN SERUM: CPT | Performed by: INTERNAL MEDICINE

## 2020-02-20 PROCEDURE — 83735 ASSAY OF MAGNESIUM: CPT | Performed by: INTERNAL MEDICINE

## 2020-02-20 PROCEDURE — 00000146 ZZHCL STATISTIC GLUCOSE BY METER IP

## 2020-02-20 PROCEDURE — 94640 AIRWAY INHALATION TREATMENT: CPT | Mod: 76

## 2020-02-20 PROCEDURE — 84450 TRANSFERASE (AST) (SGOT): CPT | Performed by: INTERNAL MEDICINE

## 2020-02-20 PROCEDURE — 40000275 ZZH STATISTIC RCP TIME EA 10 MIN

## 2020-02-20 PROCEDURE — 84075 ASSAY ALKALINE PHOSPHATASE: CPT | Performed by: INTERNAL MEDICINE

## 2020-02-20 PROCEDURE — 94640 AIRWAY INHALATION TREATMENT: CPT

## 2020-02-20 RX ORDER — HYDRALAZINE HYDROCHLORIDE 20 MG/ML
INJECTION INTRAMUSCULAR; INTRAVENOUS
Status: COMPLETED
Start: 2020-02-20 | End: 2020-02-20

## 2020-02-20 RX ORDER — METOPROLOL TARTRATE 1 MG/ML
5 INJECTION, SOLUTION INTRAVENOUS
Status: DISCONTINUED | OUTPATIENT
Start: 2020-02-20 | End: 2020-02-23

## 2020-02-20 RX ORDER — HYDRALAZINE HYDROCHLORIDE 20 MG/ML
20 INJECTION INTRAMUSCULAR; INTRAVENOUS
Status: DISCONTINUED | OUTPATIENT
Start: 2020-02-20 | End: 2020-02-21

## 2020-02-20 RX ORDER — MORPHINE SULFATE 2 MG/ML
2-4 INJECTION, SOLUTION INTRAMUSCULAR; INTRAVENOUS EVERY 6 HOURS PRN
Status: DISCONTINUED | OUTPATIENT
Start: 2020-02-20 | End: 2020-02-24

## 2020-02-20 RX ADMIN — METOPROLOL TARTRATE 5 MG: 1 INJECTION, SOLUTION INTRAVENOUS at 05:30

## 2020-02-20 RX ADMIN — INSULIN ASPART 1 UNITS: 100 INJECTION, SOLUTION INTRAVENOUS; SUBCUTANEOUS at 03:54

## 2020-02-20 RX ADMIN — ENOXAPARIN SODIUM 100 MG: 100 INJECTION SUBCUTANEOUS at 05:30

## 2020-02-20 RX ADMIN — OSELTAMIVIR PHOSPHATE 75 MG: 75 CAPSULE ORAL at 11:19

## 2020-02-20 RX ADMIN — METOPROLOL TARTRATE 5 MG: 1 INJECTION, SOLUTION INTRAVENOUS at 17:07

## 2020-02-20 RX ADMIN — PANTOPRAZOLE SODIUM 40 MG: 40 TABLET, DELAYED RELEASE ORAL at 11:21

## 2020-02-20 RX ADMIN — IPRATROPIUM BROMIDE AND ALBUTEROL SULFATE 3 ML: .5; 3 SOLUTION RESPIRATORY (INHALATION) at 15:29

## 2020-02-20 RX ADMIN — MORPHINE SULFATE 2 MG: 2 INJECTION, SOLUTION INTRAMUSCULAR; INTRAVENOUS at 10:45

## 2020-02-20 RX ADMIN — POTASSIUM PHOSPHATE, MONOBASIC AND POTASSIUM PHOSPHATE, DIBASIC 15 MMOL: 224; 236 INJECTION, SOLUTION INTRAVENOUS at 10:45

## 2020-02-20 RX ADMIN — AMLODIPINE BESYLATE 10 MG: 5 TABLET ORAL at 11:20

## 2020-02-20 RX ADMIN — OSELTAMIVIR PHOSPHATE 75 MG: 75 CAPSULE ORAL at 22:42

## 2020-02-20 RX ADMIN — ALBUTEROL SULFATE 6 PUFF: 90 AEROSOL, METERED RESPIRATORY (INHALATION) at 02:06

## 2020-02-20 RX ADMIN — HYDROMORPHONE HYDROCHLORIDE 0.5 MG: 1 INJECTION, SOLUTION INTRAMUSCULAR; INTRAVENOUS; SUBCUTANEOUS at 11:26

## 2020-02-20 RX ADMIN — SODIUM CHLORIDE, POTASSIUM CHLORIDE, SODIUM LACTATE AND CALCIUM CHLORIDE: 600; 310; 30; 20 INJECTION, SOLUTION INTRAVENOUS at 17:06

## 2020-02-20 RX ADMIN — METHYLPREDNISOLONE SODIUM SUCCINATE 62.5 MG: 125 INJECTION, POWDER, FOR SOLUTION INTRAMUSCULAR; INTRAVENOUS at 17:06

## 2020-02-20 RX ADMIN — MORPHINE SULFATE 2 MG: 2 INJECTION, SOLUTION INTRAMUSCULAR; INTRAVENOUS at 08:44

## 2020-02-20 RX ADMIN — INSULIN ASPART 1 UNITS: 100 INJECTION, SOLUTION INTRAVENOUS; SUBCUTANEOUS at 00:37

## 2020-02-20 RX ADMIN — HYDRALAZINE HYDROCHLORIDE 10 MG: 20 INJECTION INTRAMUSCULAR; INTRAVENOUS at 10:45

## 2020-02-20 RX ADMIN — IPRATROPIUM BROMIDE AND ALBUTEROL SULFATE 3 ML: .5; 3 SOLUTION RESPIRATORY (INHALATION) at 12:12

## 2020-02-20 RX ADMIN — ALBUTEROL SULFATE 2.5 MG: 2.5 SOLUTION RESPIRATORY (INHALATION) at 04:25

## 2020-02-20 RX ADMIN — INSULIN ASPART 1 UNITS: 100 INJECTION, SOLUTION INTRAVENOUS; SUBCUTANEOUS at 13:13

## 2020-02-20 RX ADMIN — DOXYCYCLINE 100 MG: 100 INJECTION, POWDER, LYOPHILIZED, FOR SOLUTION INTRAVENOUS at 08:49

## 2020-02-20 RX ADMIN — LORAZEPAM 1 MG: 2 INJECTION, SOLUTION INTRAMUSCULAR; INTRAVENOUS at 01:00

## 2020-02-20 RX ADMIN — DOXYCYCLINE 100 MG: 100 INJECTION, POWDER, LYOPHILIZED, FOR SOLUTION INTRAVENOUS at 19:35

## 2020-02-20 RX ADMIN — METHYLPREDNISOLONE SODIUM SUCCINATE 62.5 MG: 125 INJECTION, POWDER, FOR SOLUTION INTRAMUSCULAR; INTRAVENOUS at 00:26

## 2020-02-20 RX ADMIN — IPRATROPIUM BROMIDE AND ALBUTEROL SULFATE 3 ML: .5; 3 SOLUTION RESPIRATORY (INHALATION) at 07:45

## 2020-02-20 RX ADMIN — DEXMEDETOMIDINE HYDROCHLORIDE 0.5 MCG/KG/HR: 4 INJECTION, SOLUTION INTRAVENOUS at 03:00

## 2020-02-20 RX ADMIN — ENOXAPARIN SODIUM 100 MG: 100 INJECTION SUBCUTANEOUS at 17:07

## 2020-02-20 RX ADMIN — HYDRALAZINE HYDROCHLORIDE 20 MG: 20 INJECTION INTRAMUSCULAR; INTRAVENOUS at 12:13

## 2020-02-20 RX ADMIN — HYDRALAZINE HYDROCHLORIDE 20 MG: 20 INJECTION INTRAMUSCULAR; INTRAVENOUS at 16:03

## 2020-02-20 RX ADMIN — MORPHINE SULFATE 4 MG: 2 INJECTION, SOLUTION INTRAMUSCULAR; INTRAVENOUS at 15:15

## 2020-02-20 RX ADMIN — ONDANSETRON 4 MG: 2 INJECTION INTRAMUSCULAR; INTRAVENOUS at 08:41

## 2020-02-20 RX ADMIN — METOPROLOL TARTRATE 5 MG: 1 INJECTION, SOLUTION INTRAVENOUS at 08:50

## 2020-02-20 RX ADMIN — SODIUM CHLORIDE, POTASSIUM CHLORIDE, SODIUM LACTATE AND CALCIUM CHLORIDE: 600; 310; 30; 20 INJECTION, SOLUTION INTRAVENOUS at 00:32

## 2020-02-20 RX ADMIN — INSULIN ASPART 1 UNITS: 100 INJECTION, SOLUTION INTRAVENOUS; SUBCUTANEOUS at 09:02

## 2020-02-20 RX ADMIN — CEFTRIAXONE SODIUM 1 G: 1 INJECTION, SOLUTION INTRAVENOUS at 22:36

## 2020-02-20 RX ADMIN — METOPROLOL TARTRATE 5 MG: 1 INJECTION, SOLUTION INTRAVENOUS at 13:01

## 2020-02-20 RX ADMIN — METOPROLOL TARTRATE 5 MG: 1 INJECTION, SOLUTION INTRAVENOUS at 22:36

## 2020-02-20 RX ADMIN — IPRATROPIUM BROMIDE AND ALBUTEROL SULFATE 3 ML: .5; 3 SOLUTION RESPIRATORY (INHALATION) at 19:03

## 2020-02-20 RX ADMIN — METHYLPREDNISOLONE SODIUM SUCCINATE 62.5 MG: 125 INJECTION, POWDER, FOR SOLUTION INTRAMUSCULAR; INTRAVENOUS at 08:56

## 2020-02-20 RX ADMIN — HYDRALAZINE HYDROCHLORIDE 10 MG: 20 INJECTION INTRAMUSCULAR; INTRAVENOUS at 02:06

## 2020-02-20 RX ADMIN — LORAZEPAM 1 MG: 2 INJECTION, SOLUTION INTRAMUSCULAR; INTRAVENOUS at 06:03

## 2020-02-20 ASSESSMENT — ACTIVITIES OF DAILY LIVING (ADL)
ADLS_ACUITY_SCORE: 20
ADLS_ACUITY_SCORE: 20
ADLS_ACUITY_SCORE: 18
ADLS_ACUITY_SCORE: 20
ADLS_ACUITY_SCORE: 20
ADLS_ACUITY_SCORE: 18

## 2020-02-20 ASSESSMENT — MIFFLIN-ST. JEOR: SCORE: 1836.26

## 2020-02-20 NOTE — PLAN OF CARE
Pt discussed in morning care rounds today. Per Dr. Hall, pt not medically appropriate for OT today. Will reassess tomorrow.

## 2020-02-20 NOTE — PROGRESS NOTES
"CLINICAL NUTRITION SERVICES     Ry Man : follow up    54 yom admitted for Influenza A. Pt has a hx of COPD. Pt was extubated yesterday. He has been NPO since admission. Pt did have Jevity 1.2 running at a goal rate of 40ml/hr for about 2 days, but was discontinued when they found evidence of formula in his mouth suction. Pt has a speech therapy referral to assess swallowing function. They have been unable to assess while pt has been on BiPAP. Limited weights available, but weight is stable from 2018.    Diet Order: NPO  Intake: none    Height: 6' .835\"[per CareEverywhere-this is an estimation[  Weight: 210 lbs 8.63 oz  Body mass index is 27.9 kg/m .  Weight Status:  Overweight BMI 25-29.9  IBW: 79.5kg  Weight History:  Wt Readings from Last 10 Encounters:   02/20/20  02/15/20  09/12/18 95.5 kg (210 lb 8.6 oz)  92.5 kg (203 lb 14.8 oz)  92 kg (202 lb 13.2 oz)   05/08/13 99.3 kg (219 lb)     Weight used to estimated nutrition needs - 92.5kg  Estimated Energy Needs: 5049-9955 kcals (25-30 Kcal/Kg)   Estimated Protein Needs:  grams protein (1-1.2 g pro/Kg)    Malnutrition Diagnosis: Patient does not meet two of the criteria necessary for diagnosing malnutrition    NUTRITION DIAGNOSIS:  Inadequate oral intake related to respiratory function as evidenced by NPO first 2 days on the ventilator and NPO since extubation    NUTRITION RECOMMENDATIONS  - Advance diet per SLP texture/consistnecy diet recommendations  - Suggest Ensure Enlive TID if intake is poor upon diet initiation     MONITORING AND EVALUATION:  RD will monitor intake, weight, labs          "

## 2020-02-20 NOTE — PLAN OF CARE
Paged respiratory to assess; patient reports SOB and pain in chest due to air pressures. Will continue to monitor.

## 2020-02-20 NOTE — PROVIDER NOTIFICATION
Chrissie BANKS regarding patient continued elevated BP, in spite of meds administered. Will continue to monitor patient and watch for new orders.

## 2020-02-20 NOTE — PLAN OF CARE
SLP attempted swallow eval on patient but patient back on BiPAP.  Will attempt tomorrow to evaluate swallow in regards to recent extubation, current weakness and respiratory-swallow coordination.

## 2020-02-20 NOTE — PLAN OF CARE
Face to face report given with opportunity to observe patient.    Report given to Estefany Clarke RN   2/19/2020  6:59 PM

## 2020-02-20 NOTE — PLAN OF CARE
SLP unable to see patient for bedside swallow evaluation this AM due to current status with BiPAP. Will follow-up later today.

## 2020-02-20 NOTE — PROGRESS NOTES
Patient on bipap most of shift.  Was tried off placed on nasal cannula at 4lpm.  Tolerated for 2hrs.  Patient became very short of breath.  Placed back on bipap.  Settings were 12/6 50%.  Patient then complaining of mask.  Changed then to airvo with flow of 60l/min and 50%.  spao2 97%.  rr 19-25.

## 2020-02-20 NOTE — PROGRESS NOTES
Patient tried off airvo placed on 10 lpm high flow nc.  Patient tolerated fine for 2 plus hrs.  Then developed increased effort and rate placed back on bipap 12/6 40%

## 2020-02-20 NOTE — PLAN OF CARE
Per Dr. Hall, PT Eval not appropriate today.  Will wait until BP and anxiety improved to better tolerate assessment.  Will reassess tomorrow.

## 2020-02-20 NOTE — PROGRESS NOTES
[facility]  Hospitalist Progress Note          Assessment and Plan:   Medicine Progress Note - Hospitalist Service       Date of Admission:  2/14/2020     Assessment & Plan     #1 acute respiratory failure with hypoxia and hypercapnia, mechanical ventilatory support, influenza A infection with possible bacterial superimposed infection, COPD exacerbation, possible pulmonary embolism  The patient was successfully extubated yesterday however BiPAP support was needed over the last 24 hrs.  Patient is on doxycycline and ceftriaxone for possible bacterial pneumonia as well as Tamiflu for influenza A pneumonitis.  Patient's respiratory difficulty probably is due to underlying COPD, and for this we are continuing with bronchodilators, IV steroid, supplemental oxygen, and BiPAP.  Possible pulmonary embolism may be contributing to his significant hypoxia.  We will continue with treatment dose enoxaparin and will try to confirm the presence of a pulmonary embolism by repeating chest CT angiogram when patient's respiratory status is a little more stable.     #2 History of tobacco abuse, COPD  Patient has at least 70-pack-year history of tobacco abuse; nicotine replacement     #3 Hyperkalemia  Resolved     #4 Nutritional support  Speech to see patient today.       5.  Lung nodule  CT 12/12/2016 was obtained by Dr. Garland.  Appears the patient had a plain radiograph initially which demonstrated some nodular findings, possibly requested in the context of the patient's smoking history and dyspnea.  The patient was told, based on [facility] records, that the CT requested subsequently was suggestive of atelectasis and that a follow-up study would be indicated.  Appears this may have been delayed because of financial issues.  Report of the 12/12/2016 CT indicates nodule suggestive of atelectasis in left upper lobe abutting the major fissure.  History indicates a subcentimeter nodule.  Dimensions are not noted in CT  report.  Current study shows what is likely to be a similar appearing nodular area of decreased attenuation at the major fissure.  It is most prominent in the inferior lingula abutting the heart border with what are likely to be continuous nodular areas of decreased attenuation more cephalad in the right upper lobe.  Repeated plain radiographs over the course of the last several days does show some variability in the appearance of this area of nodular infiltrate, again suggestive of compressive atelectasis.  In the context of his significant secretions he is at risk of mucoid impaction.  Certainly follow-up in the near future as was recommended previously would be indicated.        Diet: NPO for Medical/Clinical Reasons Except for: No Exceptions    DVT Prophylaxis: Enoxaparin (Lovenox) SQ  Gill Catheter: in place, indication: Strict 1-2 Hour I&O  Code Status: Full Code          Disposition Plan     Expected discharge: 2 - 3 days, recommended to prior living arrangement once antibiotic plan established, O2 use less than 0 liters/minute and stable respiratory status.           Interval History:   Patient is post extubation from yesterday.  Still seems anxious at times.  We will try him off BIPAP today.   No acute events overnight.                Medications:       amLODIPine  10 mg Oral Daily     cefTRIAXone  1 g Intravenous Q24H     cloNIDine   Transdermal Q8H     cloNIDine  1 patch Transdermal Weekly     doxycycline  intermittent infusion  100 mg Intravenous Q12H     enoxaparin ANTICOAGULANT  1 mg/kg Subcutaneous Q12H     insulin aspart  1-6 Units Subcutaneous Q4H     ipratropium - albuterol 0.5 mg/2.5 mg/3 mL  3 mL Nebulization 4x daily     methylPREDNISolone  62.5 mg Intravenous Q8H     metoprolol  5 mg Intravenous Q4H     nicotine   Transdermal Q8H     oseltamivir  75 mg Oral BID     pantoprazole  40 mg Oral QAM AC                  Physical Exam:     Vitals:    02/20/20 0715 02/20/20 0730 02/20/20 0740 02/20/20  0759   BP:  (!) 173/105     BP Location:       Pulse:       Resp: 24 24     Temp:       TempSrc:       SpO2: 97% 97% 97% 96%   Weight:       Height:             Vital Sign Ranges  Temperature Temp  Av.2  F (37.3  C)  Min: 98.9  F (37.2  C)  Max: 99.5  F (37.5  C)   Blood pressure Systolic (24hrs), Av , Min:145 , Max:194        Diastolic (24hrs), Av, Min:87, Max:129      Pulse Pulse  Av.5  Min: 67  Max: 123   Respirations Resp  Av.3  Min: 11  Max: 45   Pulse oximetry SpO2  Av.7 %  Min: 90 %  Max: 98 %         Intake/Output Summary (Last 24 hours) at 2020 1007  Last data filed at 2020 0542  Gross per 24 hour   Intake 1150 ml   Output 2760 ml   Net -1610 ml       General Appearance:  On BiPAP  Respiratory: Mild wheezing bilaterally, minimal crackles at bases  Cardiovascular: S1-S2, tachycardic, no gallops, rubs or murmur appreciated  GI: Soft, nontender nondistended  Skin: Intact  Other:  Minimal edema in all 4 extremities    Peripheral IV 02/15/20 Right;Posterior Lower forearm (Active)   Site Assessment [facility] 2020  6:00 AM   Line Status Saline locked 2020  6:00 AM   Phlebitis Scale 0-->no symptoms 2020  6:00 AM   Infiltration Scale 0 2020  6:00 AM   Infiltration Site Treatment Method  None 2020  6:00 AM   Number of days: 5       Peripheral IV 20 Left Lower forearm (Active)   Site Assessment [facility] 2020  6:00 AM   Line Status Infusing 2020  6:00 AM   Phlebitis Scale 0-->no symptoms 2020  6:00 AM   Infiltration Scale 0 2020  6:00 AM   Number of days: 4       Peripheral IV 20 Left Lower forearm (Active)   Site Assessment [facility] 2020  6:00 AM   Line Status Infusing 2020  6:00 AM   Phlebitis Scale 0-->no symptoms 2020  6:00 AM   Infiltration Scale 0 2020  6:00 AM   Infiltration Site Treatment Method  None 2020  6:00 AM   Number of days: 2       Urethral Catheter Non-latex 16 fr (Active)   Tube Description UTV  2020  4:56 PM   Catheter Care Done 2020  4:00 AM   Collection Container Standard 2020  4:00 AM   Securement Method Securing device (Describe) 2020  4:00 AM   Rationale for Continued Use Strict 1-2 Hour I&O 2020  4:00 AM   Urine Output 200 mL 2020  4:00 AM   Number of days: 6       Wound 20 Posterior;Right Ischial tuberosity Other (comment) (Active)   Site Assessment UTV 2020  4:00 AM   Drainage Amount None 2020 12:00 AM   Dressing Foam 2020  4:00 AM   Dressing Status Clean, dry, intact 2020  4:00 AM   Number of days: 1     No line/device             Data:     Echocardiogram Complete [[id number]] Collected: 20   Order Status: Completed Updated: 20 0654   Narrative:     [id number]  [id number]  [id number]  [id number]^[email or web address]^[email or web address]^WILLIAM           [facility]  Echocardiography Laboratory  [address] [place], [place] [address]     Name: AUGUSTUS QUINTERO  MRN: [id number]  : 1965  Study Date: 2020 05:36 AM  Age: 54 yrs  Gender: Male  Patient Location: [facility]  Reason For Study: CHF, Pulmonary Embolism  History: Pulmonary Embolus  Ordering Physician: LIDYA JIMENEZ  Referring Physician: LIDYA JIMENEZ  Performed By: Ynes Servin     BSA: 0.36 m2  Height: 6 in  Weight: 210 lb  _____________________________________________________________________________  __        Procedure  Technically difficult study. Pt intubated o ventiator. Done supine.  _____________________________________________________________________________  __        Interpretation Summary  Technically difficult study.  Pt intubated o ventiator. Done supine.  No pericardial effusion is present.  Global and regional left ventricular function is normal with an EF of 55-60%.  Grade I or early diastolic dysfunction.  The right ventricle is normal size.  Global right ventricular function is normal.  Mild left atrial enlargement is present.  Trace to mild mitral insufficiency is  present.  Aortic valve is normal in structure and function.  Trace to mild tricuspid insufficiency is present.  The peak velocity of the tricuspid regurgitant jet is not obtainable.  The pulmonic valve cannot be assessed.  _____________________________________________________________________________  __        Left Ventricle  Global and regional left ventricular function is normal with an EF of 55-60%.  Grade I or early diastolic dysfunction.     Right Ventricle  The right ventricle is normal size. Global right ventricular function is  normal.     Atria  Mild left atrial enlargement is present.     Mitral Valve  Trace to mild mitral insufficiency is present.     Aortic Valve  Aortic valve is normal in structure and function. The mean AoV pressure  gradient is 2.2 mmHg.        Tricuspid Valve  Trace to mild tricuspid insufficiency is present. The peak velocity of the  tricuspid regurgitant jet is not obtainable.     Pulmonic Valve  The pulmonic valve cannot be assessed.     Pericardium  No pericardial effusion is present.  _____________________________________________________________________________  __     MMode/2D Measurements & Calculations  IVSd: 0.94 cm  IVSs: 1.7 cm  LVIDd: 5.7 cm  LVIDs: 3.7 cm  LVPWd: 0.94 cm  LVPWs: 1.6 cm  FS: 34.3 %  LV mass(C)d: 206.6 grams  LV mass(C)dI: 575.4 grams/m2  LV mass(C)sI: 682.4 grams/m2  Ao root diam: 4.0 cm  LA dimension: 4.6 cm  LA/Ao: 1.1  LVOT diam: 2.4 cm  LVOT area: 4.4 cm2  RWT: 0.33           Doppler Measurements & Calculations  MV E max edmund: 75.2 cm/sec  MV A max edmund: 104.9 cm/sec  MV E/A: 0.72  MV dec slope: 288.8 cm/sec2  MV dec time: 0.26 sec  Ao V2 max: 100.5 cm/sec  Ao max P.0 mmHg  Ao V2 mean: 68.3 cm/sec  Ao mean P.2 mmHg  Ao V2 VTI: 25.7 cm  LORRAINE(I,D): 3.9 cm2  LORRAINE(V,D): 3.8 cm2  LV V1 max PG: 3.0 mmHg  LV V1 max: 86.5 cm/sec  LV V1 VTI: 23.0 cm  CO(LVOT): 15.8 l/min  CI(LVOT): 44.1 l/min/m2  SV(LVOT): 100.4 ml  SI(LVOT): 279.7 ml/m2  AV Edmund Ratio  (DI): 0.86  LORRAINE Index (cm2/m2): 10.9  E/E': 8.6  Peak E' Edmund: 8.7 cm/sec     _____________________________________________________________________________  __           Report approved by: Anabel Castillo 02/19/2020 06:49 AM           Lab Results   Component Value Date    WBC 9.5 02/20/2020    HGB 13.9 02/20/2020    HCT 41.4 02/20/2020     02/20/2020     02/20/2020    POTASSIUM 3.8 02/20/2020    CHLORIDE 110 (H) 02/20/2020    CO2 29 02/20/2020    BUN 33 (H) 02/20/2020    CR 0.59 (L) 02/20/2020     (H) 02/20/2020    DD 1.3 (H) 02/14/2020    NTBNPI 239 02/18/2020    TROPI <0.015 02/14/2020    AST 29 02/20/2020    ALT 30 02/20/2020    ALKPHOS 45 02/20/2020    BILITOTAL 0.6 02/20/2020     Lactic Acid   Date Value Ref Range Status   02/14/2020 1.5 0.7 - 2.0 mmol/L Final   02/14/2020 1.5 0.7 - 2.0 mmol/L Final       Junior Hall DO

## 2020-02-20 NOTE — PLAN OF CARE
Pt has been becoming increasingly anxious related to condition. MD notified. PRN ativan order increased.

## 2020-02-20 NOTE — PLAN OF CARE
Pt alert and orientated. Lung sounds going between clear and diminished to having decreased air movement.  to HR sinus claudia 50's. Blood pressures continue to be elevated to 180/100s. IV metoptolol Increased to Q6hr and PRN Hydralazine given. Bp does improve for short time then slowly increases back to 180s. Pt currently on Bipap at 12/6 and 50% Fio2. was placed on NC for a brief period of time and then attempted airvo. Pt does become very anxious at times and does become air hungry with increased BP and Pulse. Given PRN dilaudid and ativan with minimal results. Oral cares completed frequently. Adequate urine output. Spoke with Dr. Major regarding using morphine for air hunger.     Face to face report given with opportunity to observe patient.    Report given to DAVID Iniguez RN   2/20/2020  7:37 AM

## 2020-02-21 ENCOUNTER — APPOINTMENT (OUTPATIENT)
Dept: SPEECH THERAPY | Facility: HOSPITAL | Age: 55
DRG: 207 | End: 2020-02-21
Payer: COMMERCIAL

## 2020-02-21 LAB
ALBUMIN SERPL-MCNC: 3 G/DL (ref 3.4–5)
ALP SERPL-CCNC: 48 U/L (ref 40–150)
ALT SERPL W P-5'-P-CCNC: 45 U/L (ref 0–70)
ANION GAP SERPL CALCULATED.3IONS-SCNC: 5 MMOL/L (ref 3–14)
AST SERPL W P-5'-P-CCNC: 37 U/L (ref 0–45)
BILIRUB SERPL-MCNC: 0.6 MG/DL (ref 0.2–1.3)
BUN SERPL-MCNC: 29 MG/DL (ref 7–30)
CALCIUM SERPL-MCNC: 8.3 MG/DL (ref 8.5–10.1)
CHLORIDE SERPL-SCNC: 108 MMOL/L (ref 94–109)
CO2 SERPL-SCNC: 27 MMOL/L (ref 20–32)
CREAT SERPL-MCNC: 0.62 MG/DL (ref 0.66–1.25)
ERYTHROCYTE [DISTWIDTH] IN BLOOD BY AUTOMATED COUNT: 14 % (ref 10–15)
GFR SERPL CREATININE-BSD FRML MDRD: >90 ML/MIN/{1.73_M2}
GLUCOSE BLDC GLUCOMTR-MCNC: 137 MG/DL (ref 70–99)
GLUCOSE BLDC GLUCOMTR-MCNC: 144 MG/DL (ref 70–99)
GLUCOSE BLDC GLUCOMTR-MCNC: 150 MG/DL (ref 70–99)
GLUCOSE BLDC GLUCOMTR-MCNC: 151 MG/DL (ref 70–99)
GLUCOSE BLDC GLUCOMTR-MCNC: 152 MG/DL (ref 70–99)
GLUCOSE BLDC GLUCOMTR-MCNC: 153 MG/DL (ref 70–99)
GLUCOSE BLDC GLUCOMTR-MCNC: 169 MG/DL (ref 70–99)
GLUCOSE SERPL-MCNC: 158 MG/DL (ref 70–99)
HCT VFR BLD AUTO: 45 % (ref 40–53)
HGB BLD-MCNC: 14.9 G/DL (ref 13.3–17.7)
LACTATE BLD-SCNC: 1 MMOL/L (ref 0.7–2)
MAGNESIUM SERPL-MCNC: 2.4 MG/DL (ref 1.6–2.3)
MCH RBC QN AUTO: 31.4 PG (ref 26.5–33)
MCHC RBC AUTO-ENTMCNC: 33.1 G/DL (ref 31.5–36.5)
MCV RBC AUTO: 95 FL (ref 78–100)
PHOSPHATE SERPL-MCNC: 2.6 MG/DL (ref 2.5–4.5)
PLATELET # BLD AUTO: 174 10E9/L (ref 150–450)
POTASSIUM SERPL-SCNC: 4.1 MMOL/L (ref 3.4–5.3)
PROT SERPL-MCNC: 6.5 G/DL (ref 6.8–8.8)
RBC # BLD AUTO: 4.74 10E12/L (ref 4.4–5.9)
SODIUM SERPL-SCNC: 140 MMOL/L (ref 133–144)
WBC # BLD AUTO: 13.7 10E9/L (ref 4–11)

## 2020-02-21 PROCEDURE — 94640 AIRWAY INHALATION TREATMENT: CPT | Mod: 76

## 2020-02-21 PROCEDURE — 83735 ASSAY OF MAGNESIUM: CPT | Performed by: INTERNAL MEDICINE

## 2020-02-21 PROCEDURE — 25000132 ZZH RX MED GY IP 250 OP 250 PS 637: Performed by: INTERNAL MEDICINE

## 2020-02-21 PROCEDURE — 20000003 ZZH R&B ICU

## 2020-02-21 PROCEDURE — 25000128 H RX IP 250 OP 636: Performed by: INTERNAL MEDICINE

## 2020-02-21 PROCEDURE — 25000125 ZZHC RX 250: Performed by: INTERNAL MEDICINE

## 2020-02-21 PROCEDURE — 85027 COMPLETE CBC AUTOMATED: CPT | Performed by: INTERNAL MEDICINE

## 2020-02-21 PROCEDURE — 84100 ASSAY OF PHOSPHORUS: CPT | Performed by: INTERNAL MEDICINE

## 2020-02-21 PROCEDURE — 94640 AIRWAY INHALATION TREATMENT: CPT

## 2020-02-21 PROCEDURE — 00000146 ZZHCL STATISTIC GLUCOSE BY METER IP

## 2020-02-21 PROCEDURE — 83605 ASSAY OF LACTIC ACID: CPT | Performed by: INTERNAL MEDICINE

## 2020-02-21 PROCEDURE — 40000275 ZZH STATISTIC RCP TIME EA 10 MIN

## 2020-02-21 PROCEDURE — 25800030 ZZH RX IP 258 OP 636: Performed by: INTERNAL MEDICINE

## 2020-02-21 PROCEDURE — 94660 CPAP INITIATION&MGMT: CPT

## 2020-02-21 PROCEDURE — 80053 COMPREHEN METABOLIC PANEL: CPT | Performed by: INTERNAL MEDICINE

## 2020-02-21 PROCEDURE — 82565 ASSAY OF CREATININE: CPT | Performed by: INTERNAL MEDICINE

## 2020-02-21 PROCEDURE — 92610 EVALUATE SWALLOWING FUNCTION: CPT | Mod: GN

## 2020-02-21 PROCEDURE — 36415 COLL VENOUS BLD VENIPUNCTURE: CPT | Performed by: INTERNAL MEDICINE

## 2020-02-21 PROCEDURE — 99232 SBSQ HOSP IP/OBS MODERATE 35: CPT | Performed by: INTERNAL MEDICINE

## 2020-02-21 RX ORDER — CLONIDINE 0.3 MG/24H
1 PATCH, EXTENDED RELEASE TRANSDERMAL WEEKLY
Status: DISCONTINUED | OUTPATIENT
Start: 2020-02-21 | End: 2020-02-27 | Stop reason: HOSPADM

## 2020-02-21 RX ORDER — ACETAMINOPHEN 325 MG/1
650 TABLET ORAL EVERY 4 HOURS PRN
Status: DISCONTINUED | OUTPATIENT
Start: 2020-02-21 | End: 2020-02-27 | Stop reason: HOSPADM

## 2020-02-21 RX ORDER — HYDRALAZINE HYDROCHLORIDE 20 MG/ML
20 INJECTION INTRAMUSCULAR; INTRAVENOUS EVERY 4 HOURS
Status: DISCONTINUED | OUTPATIENT
Start: 2020-02-21 | End: 2020-02-23

## 2020-02-21 RX ORDER — GUAIFENESIN/DEXTROMETHORPHAN 100-10MG/5
5 SYRUP ORAL EVERY 4 HOURS PRN
Status: DISCONTINUED | OUTPATIENT
Start: 2020-02-21 | End: 2020-02-21

## 2020-02-21 RX ORDER — CODEINE PHOSPHATE AND GUAIFENESIN 10; 100 MG/5ML; MG/5ML
5 SOLUTION ORAL EVERY 4 HOURS PRN
Status: DISCONTINUED | OUTPATIENT
Start: 2020-02-21 | End: 2020-02-27 | Stop reason: HOSPADM

## 2020-02-21 RX ADMIN — HYDRALAZINE HYDROCHLORIDE 20 MG: 20 INJECTION INTRAMUSCULAR; INTRAVENOUS at 17:34

## 2020-02-21 RX ADMIN — ACETAMINOPHEN 650 MG: 325 TABLET, FILM COATED ORAL at 19:21

## 2020-02-21 RX ADMIN — INSULIN ASPART 1 UNITS: 100 INJECTION, SOLUTION INTRAVENOUS; SUBCUTANEOUS at 17:34

## 2020-02-21 RX ADMIN — METOPROLOL TARTRATE 5 MG: 1 INJECTION, SOLUTION INTRAVENOUS at 02:43

## 2020-02-21 RX ADMIN — SODIUM CHLORIDE, POTASSIUM CHLORIDE, SODIUM LACTATE AND CALCIUM CHLORIDE: 600; 310; 30; 20 INJECTION, SOLUTION INTRAVENOUS at 18:43

## 2020-02-21 RX ADMIN — INSULIN ASPART 1 UNITS: 100 INJECTION, SOLUTION INTRAVENOUS; SUBCUTANEOUS at 12:29

## 2020-02-21 RX ADMIN — INSULIN ASPART 1 UNITS: 100 INJECTION, SOLUTION INTRAVENOUS; SUBCUTANEOUS at 09:14

## 2020-02-21 RX ADMIN — METOPROLOL TARTRATE 5 MG: 1 INJECTION, SOLUTION INTRAVENOUS at 15:22

## 2020-02-21 RX ADMIN — IPRATROPIUM BROMIDE AND ALBUTEROL SULFATE 3 ML: .5; 3 SOLUTION RESPIRATORY (INHALATION) at 15:53

## 2020-02-21 RX ADMIN — HYDRALAZINE HYDROCHLORIDE 20 MG: 20 INJECTION INTRAMUSCULAR; INTRAVENOUS at 12:37

## 2020-02-21 RX ADMIN — METOPROLOL TARTRATE 5 MG: 1 INJECTION, SOLUTION INTRAVENOUS at 10:52

## 2020-02-21 RX ADMIN — ENOXAPARIN SODIUM 100 MG: 100 INJECTION SUBCUTANEOUS at 06:08

## 2020-02-21 RX ADMIN — METHYLPREDNISOLONE SODIUM SUCCINATE 62.5 MG: 125 INJECTION, POWDER, FOR SOLUTION INTRAMUSCULAR; INTRAVENOUS at 09:05

## 2020-02-21 RX ADMIN — ALBUTEROL SULFATE 2.5 MG: 2.5 SOLUTION RESPIRATORY (INHALATION) at 13:30

## 2020-02-21 RX ADMIN — SALINE NASAL SPRAY 1 SPRAY: 1.5 SOLUTION NASAL at 09:50

## 2020-02-21 RX ADMIN — AMLODIPINE BESYLATE 10 MG: 5 TABLET ORAL at 09:01

## 2020-02-21 RX ADMIN — INSULIN ASPART 1 UNITS: 100 INJECTION, SOLUTION INTRAVENOUS; SUBCUTANEOUS at 00:54

## 2020-02-21 RX ADMIN — DOXYCYCLINE 100 MG: 100 INJECTION, POWDER, LYOPHILIZED, FOR SOLUTION INTRAVENOUS at 09:00

## 2020-02-21 RX ADMIN — METOPROLOL TARTRATE 5 MG: 1 INJECTION, SOLUTION INTRAVENOUS at 18:57

## 2020-02-21 RX ADMIN — INSULIN ASPART 1 UNITS: 100 INJECTION, SOLUTION INTRAVENOUS; SUBCUTANEOUS at 21:02

## 2020-02-21 RX ADMIN — INSULIN ASPART 1 UNITS: 100 INJECTION, SOLUTION INTRAVENOUS; SUBCUTANEOUS at 04:02

## 2020-02-21 RX ADMIN — PANTOPRAZOLE SODIUM 40 MG: 40 TABLET, DELAYED RELEASE ORAL at 09:02

## 2020-02-21 RX ADMIN — IPRATROPIUM BROMIDE AND ALBUTEROL SULFATE 3 ML: .5; 3 SOLUTION RESPIRATORY (INHALATION) at 07:26

## 2020-02-21 RX ADMIN — CEFTRIAXONE SODIUM 1 G: 1 INJECTION, SOLUTION INTRAVENOUS at 21:51

## 2020-02-21 RX ADMIN — OSELTAMIVIR PHOSPHATE 75 MG: 75 CAPSULE ORAL at 09:02

## 2020-02-21 RX ADMIN — DOXYCYCLINE 100 MG: 100 INJECTION, POWDER, LYOPHILIZED, FOR SOLUTION INTRAVENOUS at 20:52

## 2020-02-21 RX ADMIN — HYDRALAZINE HYDROCHLORIDE 20 MG: 20 INJECTION INTRAMUSCULAR; INTRAVENOUS at 20:52

## 2020-02-21 RX ADMIN — CLONIDINE 1 PATCH: 0.3 PATCH, EXTENDED RELEASE TRANSDERMAL at 10:50

## 2020-02-21 RX ADMIN — METOPROLOL TARTRATE 5 MG: 1 INJECTION, SOLUTION INTRAVENOUS at 06:11

## 2020-02-21 RX ADMIN — OSELTAMIVIR PHOSPHATE 75 MG: 75 CAPSULE ORAL at 20:52

## 2020-02-21 RX ADMIN — BENZOCAINE AND MENTHOL 1 LOZENGE: 15; 3.6 LOZENGE ORAL at 23:11

## 2020-02-21 RX ADMIN — METHYLPREDNISOLONE SODIUM SUCCINATE 62.5 MG: 125 INJECTION, POWDER, FOR SOLUTION INTRAMUSCULAR; INTRAVENOUS at 17:34

## 2020-02-21 RX ADMIN — METOPROLOL TARTRATE 5 MG: 1 INJECTION, SOLUTION INTRAVENOUS at 23:12

## 2020-02-21 RX ADMIN — HYDRALAZINE HYDROCHLORIDE 20 MG: 20 INJECTION INTRAMUSCULAR; INTRAVENOUS at 09:05

## 2020-02-21 RX ADMIN — MORPHINE SULFATE 4 MG: 2 INJECTION, SOLUTION INTRAMUSCULAR; INTRAVENOUS at 10:53

## 2020-02-21 RX ADMIN — ENOXAPARIN SODIUM 100 MG: 100 INJECTION SUBCUTANEOUS at 17:34

## 2020-02-21 RX ADMIN — METHYLPREDNISOLONE SODIUM SUCCINATE 62.5 MG: 125 INJECTION, POWDER, FOR SOLUTION INTRAMUSCULAR; INTRAVENOUS at 00:54

## 2020-02-21 RX ADMIN — IPRATROPIUM BROMIDE AND ALBUTEROL SULFATE 3 ML: .5; 3 SOLUTION RESPIRATORY (INHALATION) at 11:26

## 2020-02-21 RX ADMIN — SALINE NASAL SPRAY 1 SPRAY: 1.5 SOLUTION NASAL at 11:01

## 2020-02-21 ASSESSMENT — ACTIVITIES OF DAILY LIVING (ADL)
ADLS_ACUITY_SCORE: 20

## 2020-02-21 NOTE — PROGRESS NOTES
Checked in with Ry and Elisha.  Elisha questioned if Ry would need rehab at discharge, this writer shared that he has not met with OT/PT due to the severity of his illness.  Once seen and recommendations made we will cross that bridge then.  Ry and family are in agreement. CTS will continue to remain available for support and resources.

## 2020-02-21 NOTE — PLAN OF CARE
Temp: 100.1  F (37.8  C) Temp src: Tympanic BP: 131/90 Pulse: 73 Heart Rate: 72 Resp: (!) 28 SpO2: 95 % O2 Device: Nasal cannula Oxygen Delivery: 6 LPM     Patient A&O x 4; has been unable to sleep for any significant amount of time (not over an hour) since extubation. BP remains labile; controlled for part of shift with scheduled BP medications, elevated @ end of shift. Clustered cares and promoted calming measures; Patient lungs exhibit coarse wheezes and periods of grunting; nebs provided as documented; nasal saline rinse provided to clear nasal passages; patient less anxious this shift; maintaining for all of shift on HFNC titrated from 10L to 6 - tolerating well. Patient anxiety reduced with his ability to independently suction his mouth with younker - BSA; no BM this shift. Adequate UO. Dysphasia diet; honey thick liquids; patient poor appetite; encouraged sips of thickened liquids and bites of foods frequently. Bed low and locked, ID band on, video monitored, frequent rounding, room directly outside of unit station, fall band on.     Face to face report given with opportunity to observe patient.    Report given to DAVID Jaquez RN   2/21/2020  5:52 PM

## 2020-02-21 NOTE — PLAN OF CARE
Discharge Planner SLP   Patient plan for discharge: Home with assist once his health improves  Current status: Honey thick liquids and pureed textures  Barriers to return to prior living situation: None known  Recommendations for discharge: Diet as above. With oral trials while here continue to offer small bites/sips of honey and purred throughout the day in small increments. At this time patient may not be able to eat full meals due to endurance and some anxiety associated with swallowing.   Rationale for recommendations: Clinical presentation during swallow eval.        Entered by: Milena Nuñez 02/21/2020 8:09 AM

## 2020-02-21 NOTE — PROVIDER NOTIFICATION
Spoke with MD regarding patient continued elevated BP and anxiety. MD aware. Increasing patch dose and scheduling Hydraz. Will continue to monitor.

## 2020-02-21 NOTE — PROGRESS NOTES
[facility]  Hospitalist Progress Note          Assessment and Plan:      Date of Admission:  2/14/2020     Assessment & Plan     #1 acute respiratory failure with hypoxia and hypercapnia, mechanical ventilatory support, influenza A infection with possible bacterial superimposed infection, COPD exacerbation, possible pulmonary embolism.  The patient was successfully extubated yesterday however BiPAP support was needed over the last 24 hrs.  Patient is on doxycycline and ceftriaxone for possible bacterial pneumonia as well as Tamiflu for influenza A pneumonitis.  Patient's respiratory difficulty probably is due to underlying COPD, and for this we are continuing with bronchodilators, IV steroid, supplemental oxygen, and BiPAP.  Possible pulmonary embolism may be contributing to his significant hypoxia.  We will continue with treatment dose enoxaparin and will try to confirm the presence of a pulmonary embolism by repeating chest CT angiogram when patient's respiratory status is a little more stable.     #2 History of tobacco abuse, COPD  Patient has at least 70-pack-year history of tobacco abuse; nicotine replacement     #3 Hyperkalemia  Resolved     #4 Nutritional support  Speech pathology is following and appreciate their recommendations.        5.  Lung nodule  CT 12/12/2016 was obtained by Dr. Garland.  Appears the patient had a plain radiograph initially which demonstrated some nodular findings, possibly requested in the context of the patient's smoking history and dyspnea.  The patient was told, based on [facility] records, that the CT requested subsequently was suggestive of atelectasis and that a follow-up study would be indicated.  Appears this may have been delayed because of financial issues.  Report of the 12/12/2016 CT indicates nodule suggestive of atelectasis in left upper lobe abutting the major fissure.  History indicates a subcentimeter nodule.  Dimensions are not noted in CT report.  Pt a&ox3 IVL in place, VSS, Plan of care updated with patient, will cont to monitor Cb in reach Pt on RA  Current study shows what is likely to be a similar appearing nodular area of decreased attenuation at the major fissure.  It is most prominent in the inferior lingula abutting the heart border with what are likely to be continuous nodular areas of decreased attenuation more cephalad in the right upper lobe.  Repeated plain radiographs over the course of the last several days does show some variability in the appearance of this area of nodular infiltrate, again suggestive of compressive atelectasis.  In the context of his significant secretions he is at risk of mucoid impaction.  Certainly follow-up in the near future as was recommended previously would be indicated.        Diet: Dysphagia 1 diet     DVT Prophylaxis: Enoxaparin (Lovenox) SQO  Code Status: Full Code          Disposition Plan     Expected discharge: 2 - 3 days           Interval History:   Patient was on BIPAP last night now off and on high flow.  Seems improved, less anxious.  Did better with swallow study today.                Medications:       amLODIPine  10 mg Oral Daily     cefTRIAXone  1 g Intravenous Q24H     cloNIDine   Transdermal Q8H     cloNIDine  1 patch Transdermal Weekly     doxycycline  intermittent infusion  100 mg Intravenous Q12H     enoxaparin ANTICOAGULANT  1 mg/kg Subcutaneous Q12H     hydrALAZINE  20 mg Intravenous Q4H     insulin aspart  1-6 Units Subcutaneous Q4H     ipratropium - albuterol 0.5 mg/2.5 mg/3 mL  3 mL Nebulization 4x daily     methylPREDNISolone  62.5 mg Intravenous Q8H     metoprolol  5 mg Intravenous Q4H     nicotine   Transdermal Q8H     oseltamivir  75 mg Oral BID     pantoprazole  40 mg Oral QAM AC                  Physical Exam:     Vitals:    02/21/20 0900 02/21/20 0930 02/21/20 1000 02/21/20 1030   BP: (!) 170/113 140/94 143/94 142/89   BP Location:       Pulse:       Resp: 21 23 (!) 31 18   Temp:       TempSrc:       SpO2: 95% (!) 90% 92% (!) 91%   Weight:       Height:             Vital Sign  Ranges  Temperature Temp  Av.3  F (37.4  C)  Min: 98.5  F (36.9  C)  Max: 99.7  F (37.6  C)   Blood pressure Systolic (24hrs), Av , Min:140 , Max:224        Diastolic (24hrs), Av, Min:89, Max:147      Pulse Pulse  Av.3  Min: 63  Max: 97   Respirations Resp  Av.2  Min: 18  Max: 52   Pulse oximetry SpO2  Av.1 %  Min: 89 %  Max: 98 %         Intake/Output Summary (Last 24 hours) at 2020 1044  Last data filed at 2020 0845  Gross per 24 hour   Intake 2044 ml   Output 3370 ml   Net -1326 ml     General Appearance:  Alert, less anxious today.  Responding to all questions.    Respiratory: Mild wheezing bilaterally, minimal crackles at bases  Cardiovascular: S1-S2, tachycardic, no gallops, rubs or murmur appreciated  GI: Soft, nontender nondistended  Skin: Intact  Other:  Minimal edema in all 4 extremities      Peripheral IV 02/15/20 Right;Posterior Lower forearm (Active)   Site Assessment [facility] 2020  8:54 AM   Line Status Infusing 2020  8:54 AM   Phlebitis Scale 0-->no symptoms 2020  8:54 AM   Infiltration Scale 0 2020  8:54 AM   Infiltration Site Treatment Method  None 2020  6:00 AM   Number of days: 6       Peripheral IV 20 Left Upper arm (Active)   Site Assessment [facility] 2020  8:54 AM   Line Status Infusing 2020  8:54 AM   Phlebitis Scale 0-->no symptoms 2020  8:54 AM   Infiltration Scale 0 2020  8:54 AM   Infiltration Site Treatment Method  None 2020  5:00 AM   Number of days: 1       Urethral Catheter Non-latex 16 fr (Active)   Tube Description Positional 2020  8:30 AM   Catheter Care Done 2020  6:00 AM   Collection Container Standard 2020  6:00 AM   Securement Method Securing device (Describe) 2020  6:00 AM   Rationale for Continued Use Strict 1-2 Hour I&O 2020  6:00 AM   Urine Output 500 mL 2020  8:45 AM   Number of days: 7       Wound 20 Posterior;Right Ischial tuberosity Other (comment)  (Active)   Site Assessment UTV 2/21/2020  4:00 AM   Drainage Amount None 2/21/2020  4:00 AM   Dressing Foam 2/21/2020  4:00 AM   Dressing Status Clean, dry, intact 2/21/2020  4:00 AM   Number of days: 2     No line/device             Data:     Lab Results   Component Value Date    WBC 13.7 (H) 02/21/2020    HGB 14.9 02/21/2020    HCT 45.0 02/21/2020     02/21/2020     02/21/2020    POTASSIUM 4.1 02/21/2020    CHLORIDE 108 02/21/2020    CO2 27 02/21/2020    BUN 29 02/21/2020    CR 0.62 (L) 02/21/2020     (H) 02/21/2020    DD 1.3 (H) 02/14/2020    NTBNPI 239 02/18/2020    TROPI <0.015 02/14/2020    AST 37 02/21/2020    ALT 45 02/21/2020    ALKPHOS 48 02/21/2020    BILITOTAL 0.6 02/21/2020     Lactic Acid   Date Value Ref Range Status   02/14/2020 1.5 0.7 - 2.0 mmol/L Final   02/14/2020 1.5 0.7 - 2.0 mmol/L Final       Junior Hall, DO

## 2020-02-21 NOTE — PROGRESS NOTES
02/21/20 0802   General Information   Onset Date 02/14/20   Start of Care Date 02/21/20   Referring Physician Dr. Gonzalez    Patient/Family Goals Statement Patient unable to verbalize a goal. Patient reported that he felt ok.    Swallowing Evaluation Bedside swallow evaluation   Behaviorial Observations WFL (within functional limits)   Mode of current nutrition NPO   Respiratory Status Extubated on (date)  (02/18/2020)   Clinical Swallow Evaluation   Oral Musculature generally intact   Structural Abnormalities none present   Dentition present and adequate   Secretion Management problems swallowing secretions   Mucosal Quality sticky   Mandibular Strength and Mobility intact   Oral Labial Strength and Mobility WFL   Lingual Strength and Mobility WFL   Velar Elevation intact   Buccal Strength and Mobility intact   Laryngeal Function Cough;Swallow;Throat clear   Additional Documentation Yes   Swallow Eval   Feeding Assistance frequent cues/help required   Clinical Swallow Eval: Honey Thick Liquid Texture Trial   Mode of Presentation, Honey spoon   Volume of Honey Presented 2 tsp   Oral Phase, Honey WFL   Pharyngeal Phase, Honey intact   Diagnostic Statement Patient tolerated honey thick liquids with no overt s/sx of pen/asp. Patient demonstrated a wet vocal quality prior to swallowing that seemed to clear with swallow attempts.    Clinical Swallow Eval: Puree Solid Texture Trial   Mode of Presentation, Puree spoon   Volume of Puree Presented 2 tsp   Oral Phase, Puree WFL   Pharyngeal Phase, Puree intact;repeated swallows   Diagnostic Statement Patient tolerated pureed textures with no overt s/sx of pen/asp.   Swallow Compensations   Swallow Compensations Pacing;Reduce amounts;Multiple swallow   General Therapy Interventions   Planned Therapy Interventions Dysphagia Treatment   Dysphagia treatment Oropharyngeal exercise training;Modified diet education;Instruction of safe swallow strategies;Compensatory strategies for  swallowing   Swallow Eval: Clinical Impressions   Skilled Criteria for Therapy Intervention Skilled criteria met.  Treatment indicated.   Dysphagia Outcome Severity Scale (SHARRI) Level 3 - SHARRI   Treatment Diagnosis Pharyngeal dysphagia    Diet texture recommendations Dysphagia diet level 1;Honey thick liquids   Recommended Feeding/Eating Techniques check mouth frequently for oral residue/pocketing;alternate between small bites and sips of food/liquid;hard swallow w/ each bite or sip;maintain upright posture during/after eating for 30 mins;no straws;small sips/bites   Therapy Frequency 5x/week   Predicted Duration of Therapy Intervention (days/wks) 2 weeks   Anticipated Discharge Disposition home w/ assist   Risks and Benefits of Treatment have been explained. Yes   Patient, family and/or staff in agreement with Plan of Care Yes   Clinical Impression Comments Patient demonstrated successful swallows on pureed and honey thick liquids. Patient reported some feeling of sticking in his throat but demonstrated a significantly improved vocal quality post swallow. Patient demonstrated no cough with oral trials. Patient did double swallow with pureed textures but again showed no s/sx of pen/asp. At this time SLP recommends pureed and honey thick liquids with short/small presentations to ensure that patient does not become fatigured. Patient seemed to become very anxious during swallow trials as noted by increased respirations. Patient showed no changed in sats while swallowing.    Total Evaluation Time   Total Evaluation Time (Minutes) 15

## 2020-02-21 NOTE — PLAN OF CARE
"Temp: 99.1  F (37.3  C) Temp src: Tympanic BP: (!) 171/110 Pulse: 96 Heart Rate: 104 Resp: (!) 29 SpO2: 94 % O2 Device: BiPAP/CPAP     Patient remains anxious; A&O x 4; cooperative. Patient is weak and needs assistance to lift arms for cares; Turn Q 2 and weight shifted with pillows. Assessments as charted. Patient began shift on BiPap and advanced to AirVo - did not tolerate AirVo felt pain in chest \"because of air pressure\" and felt like he was drowning. High flow NC w/humidification applied @ 10L. Patient tolerated fair for roughly 1.5 hours - continued to panic that he could not breathe, BP spiked (240/120) - morphine provided as charted (total of 6 mg this shift) to assist with air hunger; relief noted, however wife is at bedside, which also contributes to his calm. Adequate urine output; frequent attempts at stooling; no BM. Frequent oral suctioning; moderated sputum thick, tan to clear, frothy. Bed low and locked, ID band on, soft touch call light in reach; video monitoring, frequent rounding, room directly outside of unit station.     Face to face report given with opportunity to observe patient.    Report given to Suzie RN's    Kimberly Freitas RN   2/20/2020  7:38 PM    "

## 2020-02-21 NOTE — PLAN OF CARE
Per Dr. Hall, pt is not medically ready for OT today. Will reassess Monday, as OT is not available over the weekend.

## 2020-02-21 NOTE — PLAN OF CARE
Pt alert disorientated to time. Lung sounds clear and diminished. Remains on Bipap Productive cough remains hypertensive 170's/100's receiving scheduled metoprolol Q4 hrs. Bowel sounds active. Oral cares completed frequently. Calls every 5-10 mins for assistance with coughing and suctioning out mouth. Moderate amts. Of thick white sputum. Continues to be anxious.      Face to face report given with opportunity to observe patient.    Report given to DAVID Iniguez RN   2/21/2020  8:18 AM

## 2020-02-22 LAB
ALBUMIN SERPL-MCNC: 3 G/DL (ref 3.4–5)
ANION GAP SERPL CALCULATED.3IONS-SCNC: 4 MMOL/L (ref 3–14)
BASOPHILS # BLD AUTO: 0 10E9/L (ref 0–0.2)
BASOPHILS NFR BLD AUTO: 0.2 %
BUN SERPL-MCNC: 33 MG/DL (ref 7–30)
CALCIUM SERPL-MCNC: 8.4 MG/DL (ref 8.5–10.1)
CHLORIDE SERPL-SCNC: 111 MMOL/L (ref 94–109)
CO2 SERPL-SCNC: 26 MMOL/L (ref 20–32)
CREAT SERPL-MCNC: 0.63 MG/DL (ref 0.66–1.25)
DIFFERENTIAL METHOD BLD: ABNORMAL
EOSINOPHIL # BLD AUTO: 0 10E9/L (ref 0–0.7)
EOSINOPHIL NFR BLD AUTO: 0 %
ERYTHROCYTE [DISTWIDTH] IN BLOOD BY AUTOMATED COUNT: 13.5 % (ref 10–15)
GFR SERPL CREATININE-BSD FRML MDRD: >90 ML/MIN/{1.73_M2}
GLUCOSE BLDC GLUCOMTR-MCNC: 162 MG/DL (ref 70–99)
GLUCOSE BLDC GLUCOMTR-MCNC: 166 MG/DL (ref 70–99)
GLUCOSE BLDC GLUCOMTR-MCNC: 191 MG/DL (ref 70–99)
GLUCOSE BLDC GLUCOMTR-MCNC: 196 MG/DL (ref 70–99)
GLUCOSE BLDC GLUCOMTR-MCNC: 200 MG/DL (ref 70–99)
GLUCOSE SERPL-MCNC: 177 MG/DL (ref 70–99)
GLUCOSE SERPL-MCNC: 180 MG/DL (ref 70–99)
HCT VFR BLD AUTO: 44.4 % (ref 40–53)
HGB BLD-MCNC: 15.4 G/DL (ref 13.3–17.7)
IMM GRANULOCYTES # BLD: 0.3 10E9/L (ref 0–0.4)
IMM GRANULOCYTES NFR BLD: 1.9 %
LYMPHOCYTES # BLD AUTO: 0.9 10E9/L (ref 0.8–5.3)
LYMPHOCYTES NFR BLD AUTO: 5.7 %
MAGNESIUM SERPL-MCNC: 2.4 MG/DL (ref 1.6–2.3)
MCH RBC QN AUTO: 32 PG (ref 26.5–33)
MCHC RBC AUTO-ENTMCNC: 34.7 G/DL (ref 31.5–36.5)
MCV RBC AUTO: 92 FL (ref 78–100)
MONOCYTES # BLD AUTO: 0.5 10E9/L (ref 0–1.3)
MONOCYTES NFR BLD AUTO: 3.2 %
NEUTROPHILS # BLD AUTO: 13.8 10E9/L (ref 1.6–8.3)
NEUTROPHILS NFR BLD AUTO: 89 %
NRBC # BLD AUTO: 0 10*3/UL
NRBC BLD AUTO-RTO: 0 /100
PHOSPHATE SERPL-MCNC: 2.8 MG/DL (ref 2.5–4.5)
PLATELET # BLD AUTO: 213 10E9/L (ref 150–450)
POTASSIUM SERPL-SCNC: 4.3 MMOL/L (ref 3.4–5.3)
POTASSIUM SERPL-SCNC: 4.4 MMOL/L (ref 3.4–5.3)
RBC # BLD AUTO: 4.82 10E12/L (ref 4.4–5.9)
SODIUM SERPL-SCNC: 141 MMOL/L (ref 133–144)
WBC # BLD AUTO: 15.5 10E9/L (ref 4–11)

## 2020-02-22 PROCEDURE — 84132 ASSAY OF SERUM POTASSIUM: CPT | Performed by: INTERNAL MEDICINE

## 2020-02-22 PROCEDURE — 25000132 ZZH RX MED GY IP 250 OP 250 PS 637: Performed by: INTERNAL MEDICINE

## 2020-02-22 PROCEDURE — 82947 ASSAY GLUCOSE BLOOD QUANT: CPT | Performed by: INTERNAL MEDICINE

## 2020-02-22 PROCEDURE — 99232 SBSQ HOSP IP/OBS MODERATE 35: CPT | Performed by: INTERNAL MEDICINE

## 2020-02-22 PROCEDURE — 94640 AIRWAY INHALATION TREATMENT: CPT | Mod: 76

## 2020-02-22 PROCEDURE — 25000128 H RX IP 250 OP 636: Performed by: INTERNAL MEDICINE

## 2020-02-22 PROCEDURE — 25000125 ZZHC RX 250: Performed by: INTERNAL MEDICINE

## 2020-02-22 PROCEDURE — 36415 COLL VENOUS BLD VENIPUNCTURE: CPT | Performed by: INTERNAL MEDICINE

## 2020-02-22 PROCEDURE — 40000275 ZZH STATISTIC RCP TIME EA 10 MIN

## 2020-02-22 PROCEDURE — 20000003 ZZH R&B ICU

## 2020-02-22 PROCEDURE — 00000146 ZZHCL STATISTIC GLUCOSE BY METER IP

## 2020-02-22 PROCEDURE — 25800030 ZZH RX IP 258 OP 636: Performed by: INTERNAL MEDICINE

## 2020-02-22 PROCEDURE — 80069 RENAL FUNCTION PANEL: CPT | Performed by: INTERNAL MEDICINE

## 2020-02-22 PROCEDURE — 83735 ASSAY OF MAGNESIUM: CPT | Performed by: INTERNAL MEDICINE

## 2020-02-22 PROCEDURE — 94640 AIRWAY INHALATION TREATMENT: CPT

## 2020-02-22 PROCEDURE — 85025 COMPLETE CBC W/AUTO DIFF WBC: CPT | Performed by: INTERNAL MEDICINE

## 2020-02-22 RX ORDER — BISACODYL 5 MG
5 TABLET, DELAYED RELEASE (ENTERIC COATED) ORAL DAILY PRN
Status: DISCONTINUED | OUTPATIENT
Start: 2020-02-22 | End: 2020-02-27 | Stop reason: HOSPADM

## 2020-02-22 RX ORDER — BISACODYL 10 MG
10 SUPPOSITORY, RECTAL RECTAL DAILY PRN
Status: DISCONTINUED | OUTPATIENT
Start: 2020-02-22 | End: 2020-02-22

## 2020-02-22 RX ORDER — SENNOSIDES 8.6 MG
8.6 TABLET ORAL 2 TIMES DAILY PRN
Status: DISCONTINUED | OUTPATIENT
Start: 2020-02-22 | End: 2020-02-27 | Stop reason: HOSPADM

## 2020-02-22 RX ORDER — BISACODYL 5 MG
5 TABLET, DELAYED RELEASE (ENTERIC COATED) ORAL DAILY PRN
Status: DISCONTINUED | OUTPATIENT
Start: 2020-02-22 | End: 2020-02-22

## 2020-02-22 RX ORDER — BISACODYL 10 MG
10 SUPPOSITORY, RECTAL RECTAL DAILY PRN
Status: DISCONTINUED | OUTPATIENT
Start: 2020-02-22 | End: 2020-02-24

## 2020-02-22 RX ADMIN — CEFTRIAXONE SODIUM 1 G: 1 INJECTION, SOLUTION INTRAVENOUS at 21:20

## 2020-02-22 RX ADMIN — HYDRALAZINE HYDROCHLORIDE 20 MG: 20 INJECTION INTRAMUSCULAR; INTRAVENOUS at 08:48

## 2020-02-22 RX ADMIN — METHYLPREDNISOLONE SODIUM SUCCINATE 62.5 MG: 125 INJECTION, POWDER, FOR SOLUTION INTRAMUSCULAR; INTRAVENOUS at 17:14

## 2020-02-22 RX ADMIN — DOXYCYCLINE 100 MG: 100 INJECTION, POWDER, LYOPHILIZED, FOR SOLUTION INTRAVENOUS at 20:07

## 2020-02-22 RX ADMIN — METOPROLOL TARTRATE 5 MG: 1 INJECTION, SOLUTION INTRAVENOUS at 15:08

## 2020-02-22 RX ADMIN — IPRATROPIUM BROMIDE AND ALBUTEROL SULFATE 3 ML: .5; 3 SOLUTION RESPIRATORY (INHALATION) at 11:12

## 2020-02-22 RX ADMIN — GUAIFENESIN AND CODEINE PHOSPHATE 5 ML: 100; 10 SOLUTION ORAL at 00:21

## 2020-02-22 RX ADMIN — ENOXAPARIN SODIUM 100 MG: 100 INJECTION SUBCUTANEOUS at 17:21

## 2020-02-22 RX ADMIN — METHYLPREDNISOLONE SODIUM SUCCINATE 62.5 MG: 125 INJECTION, POWDER, FOR SOLUTION INTRAMUSCULAR; INTRAVENOUS at 08:43

## 2020-02-22 RX ADMIN — HYDRALAZINE HYDROCHLORIDE 20 MG: 20 INJECTION INTRAMUSCULAR; INTRAVENOUS at 17:18

## 2020-02-22 RX ADMIN — DOXYCYCLINE 100 MG: 100 INJECTION, POWDER, LYOPHILIZED, FOR SOLUTION INTRAVENOUS at 07:04

## 2020-02-22 RX ADMIN — OSELTAMIVIR PHOSPHATE 75 MG: 75 CAPSULE ORAL at 08:40

## 2020-02-22 RX ADMIN — HYDRALAZINE HYDROCHLORIDE 20 MG: 20 INJECTION INTRAMUSCULAR; INTRAVENOUS at 13:31

## 2020-02-22 RX ADMIN — METOPROLOL TARTRATE 5 MG: 1 INJECTION, SOLUTION INTRAVENOUS at 18:47

## 2020-02-22 RX ADMIN — INSULIN ASPART 2 UNITS: 100 INJECTION, SOLUTION INTRAVENOUS; SUBCUTANEOUS at 13:42

## 2020-02-22 RX ADMIN — GUAIFENESIN AND CODEINE PHOSPHATE 5 ML: 100; 10 SOLUTION ORAL at 05:11

## 2020-02-22 RX ADMIN — OSELTAMIVIR PHOSPHATE 75 MG: 75 CAPSULE ORAL at 21:20

## 2020-02-22 RX ADMIN — MORPHINE SULFATE 2 MG: 2 INJECTION, SOLUTION INTRAMUSCULAR; INTRAVENOUS at 07:07

## 2020-02-22 RX ADMIN — INSULIN ASPART 2 UNITS: 100 INJECTION, SOLUTION INTRAVENOUS; SUBCUTANEOUS at 00:20

## 2020-02-22 RX ADMIN — METHYLPREDNISOLONE SODIUM SUCCINATE 62.5 MG: 125 INJECTION, POWDER, FOR SOLUTION INTRAMUSCULAR; INTRAVENOUS at 00:15

## 2020-02-22 RX ADMIN — SODIUM CHLORIDE, POTASSIUM CHLORIDE, SODIUM LACTATE AND CALCIUM CHLORIDE: 600; 310; 30; 20 INJECTION, SOLUTION INTRAVENOUS at 13:34

## 2020-02-22 RX ADMIN — INSULIN ASPART 1 UNITS: 100 INJECTION, SOLUTION INTRAVENOUS; SUBCUTANEOUS at 08:41

## 2020-02-22 RX ADMIN — IPRATROPIUM BROMIDE AND ALBUTEROL SULFATE 3 ML: .5; 3 SOLUTION RESPIRATORY (INHALATION) at 16:31

## 2020-02-22 RX ADMIN — BISACODYL 5 MG: 5 TABLET ORAL at 13:30

## 2020-02-22 RX ADMIN — HYDRALAZINE HYDROCHLORIDE 20 MG: 20 INJECTION INTRAMUSCULAR; INTRAVENOUS at 05:11

## 2020-02-22 RX ADMIN — METOPROLOL TARTRATE 5 MG: 1 INJECTION, SOLUTION INTRAVENOUS at 03:57

## 2020-02-22 RX ADMIN — HYDRALAZINE HYDROCHLORIDE 20 MG: 20 INJECTION INTRAMUSCULAR; INTRAVENOUS at 00:15

## 2020-02-22 RX ADMIN — METOPROLOL TARTRATE 5 MG: 1 INJECTION, SOLUTION INTRAVENOUS at 11:10

## 2020-02-22 RX ADMIN — METOPROLOL TARTRATE 5 MG: 1 INJECTION, SOLUTION INTRAVENOUS at 07:04

## 2020-02-22 RX ADMIN — PANTOPRAZOLE SODIUM 40 MG: 40 TABLET, DELAYED RELEASE ORAL at 07:08

## 2020-02-22 RX ADMIN — INSULIN ASPART 1 UNITS: 100 INJECTION, SOLUTION INTRAVENOUS; SUBCUTANEOUS at 17:11

## 2020-02-22 RX ADMIN — INSULIN ASPART 2 UNITS: 100 INJECTION, SOLUTION INTRAVENOUS; SUBCUTANEOUS at 21:23

## 2020-02-22 RX ADMIN — METOPROLOL TARTRATE 5 MG: 1 INJECTION, SOLUTION INTRAVENOUS at 23:56

## 2020-02-22 RX ADMIN — IPRATROPIUM BROMIDE AND ALBUTEROL SULFATE 3 ML: .5; 3 SOLUTION RESPIRATORY (INHALATION) at 07:27

## 2020-02-22 RX ADMIN — INSULIN ASPART 1 UNITS: 100 INJECTION, SOLUTION INTRAVENOUS; SUBCUTANEOUS at 05:12

## 2020-02-22 RX ADMIN — HYDRALAZINE HYDROCHLORIDE 20 MG: 20 INJECTION INTRAMUSCULAR; INTRAVENOUS at 21:20

## 2020-02-22 RX ADMIN — ENOXAPARIN SODIUM 100 MG: 100 INJECTION SUBCUTANEOUS at 05:11

## 2020-02-22 RX ADMIN — AMLODIPINE BESYLATE 10 MG: 5 TABLET ORAL at 08:39

## 2020-02-22 ASSESSMENT — ACTIVITIES OF DAILY LIVING (ADL)
ADLS_ACUITY_SCORE: 20
ADLS_ACUITY_SCORE: 20
ADLS_ACUITY_SCORE: 18
ADLS_ACUITY_SCORE: 20
ADLS_ACUITY_SCORE: 18
ADLS_ACUITY_SCORE: 18

## 2020-02-22 NOTE — PLAN OF CARE
Pt A&O but forgetful at times. Able to make needs known. Denies pain and nausea. Turned and repositioned q2hrs as pt would allow. Blood pressure elevated, blood pressure medications administered per order. Pt uses oral suction and suctions self. Thick white secretions. LS clear with occasional wheezing that is resolved with use of the IS or coughing. . Encouraged use of IS, TCDB. Gill intact and patent with adequate output. Writer encouraged AROM. Reinforced calling for assistance. Pt able to feed himself lunch and dinner today. Pt tolerating foods and fluids if encouraged, pt has a poor appetite.  Family visiting at bedside later this shift.     Pt complained of needing to have a BM, bowel meds given. Pt attempted to stool on bedpan 2 times but was only able to pass gas.     Dressing CDI     NS running TKO into left IV site  LR at 75ml/hr to right IV site    NSR with PVCs 's    Pt tolerating 6L NC with sats 92%    Hourly rounding     Face to face report given with opportunity to observe patient.    Report given to Annie Ferrer RN   2/22/2020  7:03 PM

## 2020-02-22 NOTE — PHARMACY
[facility]    Pharmacy      Antimicrobial Stewardship Note     Current antimicrobial therapy:  Anti-infectives (From now, onward)    Start     Dose/Rate Route Frequency Ordered Stop    02/17/20 1845  doxycycline (VIBRAMYCIN) 100 mg in D5W 250 mL intermittent infusion      100 mg  125-250 mL/hr over 1-2 Hours Intravenous EVERY 12 HOURS 02/17/20 1844      02/16/20 0900  oseltamivir (TAMIFLU) capsule 75 mg      75 mg Oral 2 TIMES DAILY 02/15/20 0236      02/15/20 2200  cefTRIAXone in d5w (ROCEPHIN) intermittent infusion 1 g      1 g  over 30 Minutes Intravenous EVERY 24 HOURS 02/15/20 2143            Indication: CAP/Influenza     Days of Therapy: 8 Rocephin, 7 Tamiflu, 5 Doxycycline     Pertinent labs:  Creatinine   Creatinine   Date Value Ref Range Status   02/22/2020 0.63 (L) 0.66 - 1.25 mg/dL Final   02/21/2020 0.62 (L) 0.66 - 1.25 mg/dL Final   02/20/2020 0.59 (L) 0.66 - 1.25 mg/dL Final     WBC   WBC   Date Value Ref Range Status   02/22/2020 15.5 (H) 4.0 - 11.0 10e9/L Final   02/21/2020 13.7 (H) 4.0 - 11.0 10e9/L Final   02/20/2020 9.5 4.0 - 11.0 10e9/L Final     Procalcitonin   Procalcitonin   Date Value Ref Range Status   02/17/2020 0.12 ng/ml Final     Comment:     0.05-0.24 ng/ml Low risk of systemic bacterial infection. Local bacterial   infection possible.  Recommendation: Assess other clinical features of   infection. Discourage antibiotics unless strong clinical suspicion for serious   infection.     02/16/2020 0.41 ng/ml Final     Comment:     0.25-0.49 ng/ml  Possible early systemic infection or localized infection.     Recommendation: Encourage antibiotics only in the correct clinical context.   Consider obtaining blood cultures or other relevant cultures. Recheck PCT in   6-12 hours to ensure baseline low level. If repeat PCT is rising, consider   early systemic infection and consider starting antibiotics.     02/15/2020 0.39 ng/ml Final     Comment:     0.25-0.49 ng/ml  Possible early  systemic infection or localized infection.     Recommendation: Encourage antibiotics only in the correct clinical context.   Consider obtaining blood cultures or other relevant cultures. Recheck PCT in   6-12 hours to ensure baseline low level. If repeat PCT is rising, consider   early systemic infection and consider starting antibiotics.       CRP   CRP Inflammation   Date Value Ref [facility] Status   02/18/2020 27.7 (H) 0.0 - 8.0 mg/L Final   02/17/2020 36.4 (H) 0.0 - 8.0 mg/L Final   02/16/2020 24.6 (H) 0.0 - 8.0 mg/L Final       Culture Results:   (2/15/20) Sputum gram stain = GPC  (2/15/20) Sputum  (2/15/20) MRSA Surveillance = negative  (2/14/20) Blood  (2/14/20) Influenza = positive A     Recommendations/Interventions:  1. No stop date for Tamiflu, recommend 5 day duration, unless concomitant bacterial pneumonia - treatment can extend to 10 days if warranted.  2. Today is Day #8 of Rocephin; sputum culture is negative. Recommend stopping or switching to oral.    Tim Moreno, [facility]  February 22, 2020

## 2020-02-22 NOTE — PLAN OF CARE
Blood pressure remains slightly elevated at times, giving scheduled lopressor and hydralazine. SpO2 stable on 6lpm. RR 20s. Productive cough with thick yellow/white sputum. Using oral suction on own. Cepacol lozenges and Robitussin ordered for cough and given with some relief. Denies pain. Turn and reposition. Dressing to right thigh remains CDI. Lung sounds clear with occasional exp. Wheezes. Gill catheter patent with good urine output. Pt anxious throughout the night, around 0630 this morning pt became more anxious. Heart rate elevated to 140s, SpO2 dropped to 86%. Oxygen increased to 9lpm on HFNC. Pt instructed to deep breathe in through nose and out through mouth. 2mg Morphine given along with scheduled medications. Heart rate then decreased back down to 80's with SpO2 95%.     Face to face report given with opportunity to observe patient.    Report given to DAVID Valentin and DAVID George RN   2/22/2020  7:35 AM

## 2020-02-22 NOTE — PROGRESS NOTES
[facility]  Hospitalist Progress Note          Assessment and Plan:   Date of Admission:  2/14/2020     Assessment & Plan     #1 acute respiratory failure with hypoxia and hypercapnia, mechanical ventilatory support, influenza A infection with possible bacterial superimposed infection, COPD exacerbation, possible pulmonary embolism.  The patient was successfully extubated several days ago but then required BIPAP and now requiring HFNC O2 7L.  Patient is on doxycycline and ceftriaxone for possible bacterial pneumonia as well as Tamiflu for influenza A pneumonitis.  Patient's respiratory difficulty probably is due to underlying COPD, and for this we are continuing with bronchodilators, IV steroid, supplemental oxygen, and BiPAP.  Possible pulmonary embolism may be contributing to his significant hypoxia.  We will continue with treatment dose enoxaparin and will try to confirm the presence of a pulmonary embolism by repeating chest CT angiogram when patient's respiratory status is a little more stable.     #2 History of tobacco abuse, COPD  Patient has at least 70-pack-year history of tobacco abuse; nicotine replacement     #3 Hyperkalemia  Resolved     #4 Nutritional support  Speech pathology is following and appreciate their recommendations. Swallowing has improved.          5.  Lung nodule  CT 12/12/2016 was obtained by Dr. Garland.  Appears the patient had a plain radiograph initially which demonstrated some nodular findings, possibly requested in the context of the patient's smoking history and dyspnea.  The patient was told, based on [facility] records, that the CT requested subsequently was suggestive of atelectasis and that a follow-up study would be indicated.  Appears this may have been delayed because of financial issues.  Report of the 12/12/2016 CT indicates nodule suggestive of atelectasis in left upper lobe abutting the major fissure.  History indicates a subcentimeter nodule.  Dimensions are  not noted in CT report.  Current study shows what is likely to be a similar appearing nodular area of decreased attenuation at the major fissure.  It is most prominent in the inferior lingula abutting the heart border with what are likely to be continuous nodular areas of decreased attenuation more cephalad in the right upper lobe.  Repeated plain radiographs over the course of the last several days does show some variability in the appearance of this area of nodular infiltrate, again suggestive of compressive atelectasis.  In the context of his significant secretions he is at risk of mucoid impaction.  Certainly follow-up in the near future as was recommended previously would be indicated.        Diet: Dysphagia 1 diet     DVT Prophylaxis: Enoxaparin (Lovenox) SQO  Code Status: Full Code          Disposition Plan     Expected discharge: 2 - 3 days           Interval History:   Patient continues to gradually improve.  No BIPAP overnight.  Vitals relatively stable.  Patient's PO intake has improved.                Medications:       amLODIPine  10 mg Oral Daily     cefTRIAXone  1 g Intravenous Q24H     cloNIDine   Transdermal Q8H     cloNIDine  1 patch Transdermal Weekly     doxycycline  intermittent infusion  100 mg Intravenous Q12H     enoxaparin ANTICOAGULANT  1 mg/kg Subcutaneous Q12H     hydrALAZINE  20 mg Intravenous Q4H     insulin aspart  1-6 Units Subcutaneous Q4H     ipratropium - albuterol 0.5 mg/2.5 mg/3 mL  3 mL Nebulization 4x daily     methylPREDNISolone  62.5 mg Intravenous Q8H     metoprolol  5 mg Intravenous Q4H     nicotine   Transdermal Q8H     oseltamivir  75 mg Oral BID     pantoprazole  40 mg Oral QAM AC                  Physical Exam:     Vitals:    02/22/20 0800 02/22/20 0815 02/22/20 0830 02/22/20 0837   BP: (!) 161/104  (!) 162/109    Pulse:       Resp:  20     Temp:    99.7  F (37.6  C)   TempSrc:    Tympanic   SpO2: 93% 94% (!) 90%    Weight:       Height:             Vital Sign  Ranges  Temperature Temp  Av.7  F (37.6  C)  Min: 99  F (37.2  C)  Max: 100.1  F (37.8  C)   Blood pressure Systolic (24hrs), Av , Min:128 , Max:172        Diastolic (24hrs), Av, Min:83, Max:122      Pulse Pulse  Av.3  Min: 73  Max: 97   Respirations Resp  Av  Min: 18  Max: 33   Pulse oximetry SpO2  Av %  Min: 88 %  Max: 96 %         Intake/Output Summary (Last 24 hours) at 2020 0854  Last data filed at 2020 0846  Gross per 24 hour   Intake 2863 ml   Output 3485 ml   Net -622 ml     General Appearance:  Alert, less anxious today.  Responding to all questions.    Respiratory: Mild wheezing bilaterally, minimal crackles at bases  Cardiovascular: S1-S2, tachycardic, no gallops, rubs or murmur appreciated  GI: Soft, nontender nondistended  Skin: Intact  Other:  Minimal edema in all 4 extremities      Peripheral IV 02/15/20 Right;Posterior Lower forearm (Active)   Site Assessment [facility] 2020  8:36 AM   Line Status Infusing 2020  8:36 AM   Phlebitis Scale 0-->no symptoms 2020  8:36 AM   Infiltration Scale 0 2020  8:36 AM   Infiltration Site Treatment Method  None 2020  8:36 AM   Extravasation? No 2020  8:36 AM   Number of days: 7       Peripheral IV 20 Left Upper arm (Active)   Site Assessment [facility] 2020  8:36 AM   Line Status Infusing 2020  8:36 AM   Phlebitis Scale 0-->no symptoms 2020  8:36 AM   Infiltration Scale 0 2020  8:36 AM   Infiltration Site Treatment Method  None 2020  8:36 AM   Extravasation? No 2020  8:36 AM   Number of days: 2       Urethral Catheter Non-latex 16 fr (Active)   Tube Description Positional 2020  7:00 PM   Catheter Care Done 2020 11:00 PM   Collection Container Standard 2020  4:00 AM   Securement Method Securing device (Describe) 2020  4:00 AM   Rationale for Continued Use Strict 1-2 Hour I&O 2020  6:45 AM   Urine Output 335 mL 2020  6:45 AM   Number of days: 8        Wound 02/19/20 Posterior;Right Ischial tuberosity Other (comment) (Active)   Site Assessment UTV 2/22/2020  4:00 AM   Drainage Amount None 2/22/2020  4:00 AM   Dressing Foam 2/21/2020 11:00 PM   Dressing Status Clean, dry, intact 2/22/2020  4:00 AM   Number of days: 3     Gill             Data:     Lab Results   Component Value Date    WBC 15.5 (H) 02/22/2020    HGB 15.4 02/22/2020    HCT 44.4 02/22/2020     02/22/2020     02/22/2020    POTASSIUM 4.3 02/22/2020    POTASSIUM 4.4 02/22/2020    CHLORIDE 111 (H) 02/22/2020    CO2 26 02/22/2020    BUN 33 (H) 02/22/2020    CR 0.63 (L) 02/22/2020     (H) 02/22/2020     (H) 02/22/2020    DD 1.3 (H) 02/14/2020    NTBNPI 239 02/18/2020    TROPI <0.015 02/14/2020    AST 37 02/21/2020    ALT 45 02/21/2020    ALKPHOS 48 02/21/2020    BILITOTAL 0.6 02/21/2020     Lactic Acid   Date Value Ref [facility] Status   02/14/2020 1.5 0.7 - 2.0 mmol/L Final   02/14/2020 1.5 0.7 - 2.0 mmol/L Final       Junior Hall, DO

## 2020-02-23 LAB
ALBUMIN SERPL-MCNC: 2.9 G/DL (ref 3.4–5)
ANION GAP SERPL CALCULATED.3IONS-SCNC: 5 MMOL/L (ref 3–14)
BASOPHILS # BLD AUTO: 0 10E9/L (ref 0–0.2)
BASOPHILS NFR BLD AUTO: 0.2 %
BUN SERPL-MCNC: 33 MG/DL (ref 7–30)
CALCIUM SERPL-MCNC: 8.3 MG/DL (ref 8.5–10.1)
CHLORIDE SERPL-SCNC: 112 MMOL/L (ref 94–109)
CO2 SERPL-SCNC: 25 MMOL/L (ref 20–32)
CREAT SERPL-MCNC: 0.63 MG/DL (ref 0.66–1.25)
DIFFERENTIAL METHOD BLD: ABNORMAL
EOSINOPHIL # BLD AUTO: 0 10E9/L (ref 0–0.7)
EOSINOPHIL NFR BLD AUTO: 0 %
ERYTHROCYTE [DISTWIDTH] IN BLOOD BY AUTOMATED COUNT: 13.7 % (ref 10–15)
GFR SERPL CREATININE-BSD FRML MDRD: >90 ML/MIN/{1.73_M2}
GLUCOSE BLDC GLUCOMTR-MCNC: 142 MG/DL (ref 70–99)
GLUCOSE BLDC GLUCOMTR-MCNC: 153 MG/DL (ref 70–99)
GLUCOSE BLDC GLUCOMTR-MCNC: 169 MG/DL (ref 70–99)
GLUCOSE BLDC GLUCOMTR-MCNC: 194 MG/DL (ref 70–99)
GLUCOSE BLDC GLUCOMTR-MCNC: 210 MG/DL (ref 70–99)
GLUCOSE SERPL-MCNC: 180 MG/DL (ref 70–99)
HCT VFR BLD AUTO: 43.2 % (ref 40–53)
HGB BLD-MCNC: 14.3 G/DL (ref 13.3–17.7)
IMM GRANULOCYTES # BLD: 0.3 10E9/L (ref 0–0.4)
IMM GRANULOCYTES NFR BLD: 2.1 %
LACTATE BLD-SCNC: 1.2 MMOL/L (ref 0.7–2)
LYMPHOCYTES # BLD AUTO: 0.9 10E9/L (ref 0.8–5.3)
LYMPHOCYTES NFR BLD AUTO: 6 %
MAGNESIUM SERPL-MCNC: 2.3 MG/DL (ref 1.6–2.3)
MCH RBC QN AUTO: 31.4 PG (ref 26.5–33)
MCHC RBC AUTO-ENTMCNC: 33.1 G/DL (ref 31.5–36.5)
MCV RBC AUTO: 95 FL (ref 78–100)
MONOCYTES # BLD AUTO: 0.5 10E9/L (ref 0–1.3)
MONOCYTES NFR BLD AUTO: 3 %
NEUTROPHILS # BLD AUTO: 13.5 10E9/L (ref 1.6–8.3)
NEUTROPHILS NFR BLD AUTO: 88.7 %
NRBC # BLD AUTO: 0 10*3/UL
NRBC BLD AUTO-RTO: 0 /100
PHOSPHATE SERPL-MCNC: 2.9 MG/DL (ref 2.5–4.5)
PLATELET # BLD AUTO: 190 10E9/L (ref 150–450)
POTASSIUM SERPL-SCNC: 4.3 MMOL/L (ref 3.4–5.3)
RBC # BLD AUTO: 4.55 10E12/L (ref 4.4–5.9)
SODIUM SERPL-SCNC: 142 MMOL/L (ref 133–144)
WBC # BLD AUTO: 15.3 10E9/L (ref 4–11)

## 2020-02-23 PROCEDURE — 25800030 ZZH RX IP 258 OP 636: Performed by: INTERNAL MEDICINE

## 2020-02-23 PROCEDURE — 25000125 ZZHC RX 250: Performed by: INTERNAL MEDICINE

## 2020-02-23 PROCEDURE — 94640 AIRWAY INHALATION TREATMENT: CPT

## 2020-02-23 PROCEDURE — 25000132 ZZH RX MED GY IP 250 OP 250 PS 637: Performed by: INTERNAL MEDICINE

## 2020-02-23 PROCEDURE — 25000131 ZZH RX MED GY IP 250 OP 636 PS 637: Performed by: INTERNAL MEDICINE

## 2020-02-23 PROCEDURE — 20000003 ZZH R&B ICU

## 2020-02-23 PROCEDURE — 25000128 H RX IP 250 OP 636: Performed by: INTERNAL MEDICINE

## 2020-02-23 PROCEDURE — 85025 COMPLETE CBC W/AUTO DIFF WBC: CPT | Performed by: INTERNAL MEDICINE

## 2020-02-23 PROCEDURE — 94640 AIRWAY INHALATION TREATMENT: CPT | Mod: 76

## 2020-02-23 PROCEDURE — 83605 ASSAY OF LACTIC ACID: CPT | Performed by: INTERNAL MEDICINE

## 2020-02-23 PROCEDURE — 80069 RENAL FUNCTION PANEL: CPT | Performed by: INTERNAL MEDICINE

## 2020-02-23 PROCEDURE — 83735 ASSAY OF MAGNESIUM: CPT | Performed by: INTERNAL MEDICINE

## 2020-02-23 PROCEDURE — 99233 SBSQ HOSP IP/OBS HIGH 50: CPT | Performed by: INTERNAL MEDICINE

## 2020-02-23 PROCEDURE — 36415 COLL VENOUS BLD VENIPUNCTURE: CPT | Performed by: INTERNAL MEDICINE

## 2020-02-23 PROCEDURE — 00000146 ZZHCL STATISTIC GLUCOSE BY METER IP

## 2020-02-23 PROCEDURE — 40000275 ZZH STATISTIC RCP TIME EA 10 MIN

## 2020-02-23 RX ORDER — CEFTRIAXONE SODIUM 1 G/50ML
1 INJECTION, SOLUTION INTRAVENOUS EVERY 24 HOURS
Status: COMPLETED | OUTPATIENT
Start: 2020-02-23 | End: 2020-02-23

## 2020-02-23 RX ORDER — PREDNISONE 20 MG/1
40 TABLET ORAL DAILY
Status: DISCONTINUED | OUTPATIENT
Start: 2020-02-23 | End: 2020-02-26

## 2020-02-23 RX ORDER — METOPROLOL TARTRATE 25 MG/1
25 TABLET, FILM COATED ORAL 2 TIMES DAILY
Status: DISCONTINUED | OUTPATIENT
Start: 2020-02-23 | End: 2020-02-27 | Stop reason: HOSPADM

## 2020-02-23 RX ADMIN — IPRATROPIUM BROMIDE AND ALBUTEROL SULFATE 3 ML: .5; 3 SOLUTION RESPIRATORY (INHALATION) at 07:19

## 2020-02-23 RX ADMIN — METOPROLOL TARTRATE 5 MG: 1 INJECTION, SOLUTION INTRAVENOUS at 06:45

## 2020-02-23 RX ADMIN — METOPROLOL TARTRATE 25 MG: 25 TABLET ORAL at 09:45

## 2020-02-23 RX ADMIN — INSULIN ASPART 2 UNITS: 100 INJECTION, SOLUTION INTRAVENOUS; SUBCUTANEOUS at 17:13

## 2020-02-23 RX ADMIN — CEFTRIAXONE SODIUM 1 G: 1 INJECTION, SOLUTION INTRAVENOUS at 21:39

## 2020-02-23 RX ADMIN — ENOXAPARIN SODIUM 90 MG: 100 INJECTION SUBCUTANEOUS at 17:14

## 2020-02-23 RX ADMIN — HYDRALAZINE HYDROCHLORIDE 20 MG: 20 INJECTION INTRAMUSCULAR; INTRAVENOUS at 01:36

## 2020-02-23 RX ADMIN — PANTOPRAZOLE SODIUM 40 MG: 40 TABLET, DELAYED RELEASE ORAL at 06:45

## 2020-02-23 RX ADMIN — INSULIN ASPART 1 UNITS: 100 INJECTION, SOLUTION INTRAVENOUS; SUBCUTANEOUS at 05:25

## 2020-02-23 RX ADMIN — METOPROLOL TARTRATE 5 MG: 1 INJECTION, SOLUTION INTRAVENOUS at 03:52

## 2020-02-23 RX ADMIN — METHYLPREDNISOLONE SODIUM SUCCINATE 62.5 MG: 125 INJECTION, POWDER, FOR SOLUTION INTRAMUSCULAR; INTRAVENOUS at 00:06

## 2020-02-23 RX ADMIN — DOXYCYCLINE 100 MG: 100 INJECTION, POWDER, LYOPHILIZED, FOR SOLUTION INTRAVENOUS at 07:32

## 2020-02-23 RX ADMIN — SODIUM CHLORIDE, POTASSIUM CHLORIDE, SODIUM LACTATE AND CALCIUM CHLORIDE: 600; 310; 30; 20 INJECTION, SOLUTION INTRAVENOUS at 13:57

## 2020-02-23 RX ADMIN — IPRATROPIUM BROMIDE AND ALBUTEROL SULFATE 3 ML: .5; 3 SOLUTION RESPIRATORY (INHALATION) at 17:22

## 2020-02-23 RX ADMIN — INSULIN ASPART 2 UNITS: 100 INJECTION, SOLUTION INTRAVENOUS; SUBCUTANEOUS at 09:49

## 2020-02-23 RX ADMIN — IPRATROPIUM BROMIDE AND ALBUTEROL SULFATE 3 ML: .5; 3 SOLUTION RESPIRATORY (INHALATION) at 13:39

## 2020-02-23 RX ADMIN — HYDRALAZINE HYDROCHLORIDE 20 MG: 20 INJECTION INTRAMUSCULAR; INTRAVENOUS at 05:24

## 2020-02-23 RX ADMIN — AMLODIPINE BESYLATE 10 MG: 5 TABLET ORAL at 09:45

## 2020-02-23 RX ADMIN — INSULIN ASPART 1 UNITS: 100 INJECTION, SOLUTION INTRAVENOUS; SUBCUTANEOUS at 00:06

## 2020-02-23 RX ADMIN — PREDNISONE 40 MG: 20 TABLET ORAL at 09:45

## 2020-02-23 RX ADMIN — DOXYCYCLINE 100 MG: 100 INJECTION, POWDER, LYOPHILIZED, FOR SOLUTION INTRAVENOUS at 20:25

## 2020-02-23 RX ADMIN — METOPROLOL TARTRATE 25 MG: 25 TABLET ORAL at 20:25

## 2020-02-23 RX ADMIN — ENOXAPARIN SODIUM 100 MG: 100 INJECTION SUBCUTANEOUS at 05:27

## 2020-02-23 RX ADMIN — INSULIN ASPART 1 UNITS: 100 INJECTION, SOLUTION INTRAVENOUS; SUBCUTANEOUS at 13:49

## 2020-02-23 ASSESSMENT — ACTIVITIES OF DAILY LIVING (ADL)
ADLS_ACUITY_SCORE: 18
ADLS_ACUITY_SCORE: 20
ADLS_ACUITY_SCORE: 20
ADLS_ACUITY_SCORE: 18

## 2020-02-23 ASSESSMENT — MIFFLIN-ST. JEOR: SCORE: 1760.26

## 2020-02-23 NOTE — PLAN OF CARE
VSS on 6lpm via HFNC. Lung sounds remain clear. No nebulizers or prn medications this shift. Denies pain. Scheduled IV blood pressure medications continue. Slept a good part of the night. Turn and reposition as needed. Pt shifting weight on his own better now. Gill catheter in place with good urine output. Using IS while awake, productive cough with small amount of white thick sputum. LR at 75ml/hr.     Face to face report given with opportunity to observe patient.    Report given to DAVID Cabello and DAVID Valentin RN   2/23/2020  7:16 AM

## 2020-02-23 NOTE — PHARMACY
[facility]    Pharmacy      Antimicrobial Stewardship Note     Current antimicrobial therapy:  Anti-infectives (From now, onward)    Start     Dose/Rate Route Frequency Ordered Stop    02/23/20 2100  cefTRIAXone in d5w (ROCEPHIN) intermittent infusion 1 g      1 g  over 30 Minutes Intravenous EVERY 24 HOURS 02/23/20 0901 02/24/20 2059 02/23/20 2000  doxycycline (VIBRAMYCIN) 100 mg in D5W 250 mL intermittent infusion      100 mg  125-250 mL/hr over 1-2 Hours Intravenous EVERY 12 HOURS 02/23/20 0901 02/24/20 0759          Indication: CAP    Days of Therapy: 9 (Rocephin), 6 (Doxy)     Pertinent labs:  Creatinine   Creatinine   Date Value Ref [facility] Status   02/23/2020 0.63 (L) 0.66 - 1.25 mg/dL Final   02/22/2020 0.63 (L) 0.66 - 1.25 mg/dL Final   02/21/2020 0.62 (L) 0.66 - 1.25 mg/dL Final     WBC   WBC   Date Value Ref [facility] Status   02/23/2020 15.3 (H) 4.0 - 11.0 10e9/L Final   02/22/2020 15.5 (H) 4.0 - 11.0 10e9/L Final   02/21/2020 13.7 (H) 4.0 - 11.0 10e9/L Final     Procalcitonin   Procalcitonin   Date Value Ref [facility] Status   02/17/2020 0.12 ng/ml Final     Comment:     0.05-0.24 ng/ml Low risk of systemic bacterial infection. Local bacterial   infection possible.  Recommendation: Assess other clinical features of   infection. Discourage antibiotics unless strong clinical suspicion for serious   infection.     02/16/2020 0.41 ng/ml Final     Comment:     0.25-0.49 ng/ml  Possible early systemic infection or localized infection.     Recommendation: Encourage antibiotics only in the correct clinical context.   Consider obtaining blood cultures or other relevant cultures. Recheck PCT in   6-12 hours to ensure baseline low level. If repeat PCT is rising, consider   early systemic infection and consider starting antibiotics.     02/15/2020 0.39 ng/ml Final     Comment:     0.25-0.49 ng/ml  Possible early systemic infection or localized infection.     Recommendation: Encourage antibiotics only in the  correct clinical context.   Consider obtaining blood cultures or other relevant cultures. Recheck PCT in   6-12 hours to ensure baseline low level. If repeat PCT is rising, consider   early systemic infection and consider starting antibiotics.       CRP   CRP Inflammation   Date Value Ref [facility] Status   02/18/2020 27.7 (H) 0.0 - 8.0 mg/L Final   02/17/2020 36.4 (H) 0.0 - 8.0 mg/L Final   02/16/2020 24.6 (H) 0.0 - 8.0 mg/L Final       Culture Results:   (2/15/20) Sputum gram stain = GPC  (2/15/20) Sputum  (2/15/20) MRSA Surveillance = negative  (2/14/20) Blood  (2/14/20) Influenza = positive A     Recommendations/Interventions:  1. None; 1 more dose of each has been ordered and Tamiflu stopped per yesterday's antimicrobial stewardship note.    Tim Moreno, [facility]  February 23, 2020

## 2020-02-23 NOTE — PROGRESS NOTES
[facility]    Hospitalist Progress Note      Assessment & Plan   Ry Man is a 54 year old male who was admitted on 2/14/2020.      1.  Acute respiratory failure with hypoxia and hypercapnia: Patient was intubated on 2/15 and extubated on 2/19.  He was temporarily on BiPAP.  Is now been off BiPAP.  Is currently on 5 L of O2.  Has not been out of bed yet unfortunately.  Plans are to continue with treatment of his COPD exacerbation.  Bronchodilators steroids probably will work on antibiotics.  We will get him up moving and see how he does.  Presumably also had pulmonary embolism.  This certainly is adding to part of his hypoxia I would believe.    2.  COPD exacerbation: This is probably the main reason why he was intubated also with his influenza A.  Does have some infiltrates but no clear bacterial infection.  All cultures negative.  Procalcitonin never really super high it was 0.41.  Has been on antibiotics for 9 days.  We will continue 1 more day of the Rocephin and discontinue.  Do not feel there is a requirement for continued oral antibiotics.  He has been on Solu-Medrol since admission we will put him on some oral prednisone today.  Continue with bronchodilators the duo nebs.  We do need to get him up and move him get up in the chair and start more active physical therapy.  Still requiring a fair amount of oxygen but will wean this down as feasible.      3.  Influenza A: He has had 9 days of Tamiflu will discontinue it at this time.    4.  Probable pulmonary embolism: His initial CT angiogram was a suboptimal study however certainly appears to have that pulmonary emboli.  I do not think we have any other options then to treat him with anticoagulation.  Repeat CT angiogram will be of no help unless it continues to show clot.  If it does not show clot we still have to anticoagulate him so I see no indication for repeating this.  Will switch him over to an oral anticoagulant over the next day or so.   [facility]  At this point I think they cover most anything.  Likely would be easier to place him on Xarelto/Eliquis.    5.  History of lung nodule: Not sure where he was sitting on this.  His most recent CT scan did not show a whole lot.  Once everything comes down a follow-up CT scan is an appropriate step as an outpatient.    6.  Cardiac status: Hemodynamically stable.  Blood pressure is good his echocardiogram really showed no significant abnormalities.  Volume status appears to be relatively euvolemic also.  No dysrhythmias.  He has been on IV hydralazine, clonidine patch, IV metoprolol and oral amlodipine daily for his hypertension.  We will get him off the IVs medications.  Switch him over to 25 twice daily of metoprolol.  Stop the IV hydralazine.  Monitor him closely at this time.    7.  Hyperglycemia: Likely due to the high doses of Solu-Medrol.  Put him on prednisone now.  Will discontinue coverage.  Just check twice daily for now.    8.  Disposition: At this point patient is alert feeling better.  Still requiring a fair amount of oxygen.  Has been basically bedbound for the last 9 days.  Does need to start physical therapy.  Needs to get up in the chair today.  Mobilize him as able.          Diet: Dysphagia Diet Level 1 Pureed Honey Thickened Liquids (pre-thickened or use instant food thickener)  Fluids: LR 75 cc an hour    DVT Prophylaxis: Enoxaparin (Lovenox) SQ full dose  Code Status: Full Code    Disposition: Expected discharge in 2-3 days once stable on oral medications.    Jw Sinha    Interval History   Patient is alert feeling better.  Very weak.  Is still on start of a dysphagia diet.  I think we can put him on thin liquids and regular diet if he tolerates that trial today.  Does need to get out of bed.  Has been bedbound.  Up in chair start mobilizing him.  Will switch over all of his medications to oral.  Discontinue antibiotics starting tomorrow.  No signs of  infection.    -Data reviewed today: I reviewed all new labs and imaging results over the last 24 hours. I personally reviewed no images or EKG's today.    Physical Exam   Temp: 98.8  F (37.1  C) Temp src: Tympanic BP: (!) 147/101 Pulse: 81 Heart Rate: 82 Resp: (!) 30 SpO2: 93 % O2 Device: High Flow Nasal Cannula (HFNC) Oxygen Delivery: 5 LPM  Vitals:    02/19/20 0418 02/20/20 0608 02/23/20 0400   Weight: 95.9 kg (211 lb 6.7 oz) 95.5 kg (210 lb 8.6 oz) 87.9 kg (193 lb 12.6 oz)     Vital Signs with Ranges  Temp:  [98.5  F (36.9  C)-99.5  F (37.5  C)] 98.8  F (37.1  C)  Pulse:  [81] 81  Heart Rate:  [] 82  Resp:  [14-30] 30  BP: (121-169)/() 147/101  SpO2:  [88 %-95 %] 93 %  I/O last 3 completed shifts:  In: 3564 [P.O.:530; I.V.:3034]  Out: 2885 [Urine:2885]    Peripheral IV 02/15/20 Right;Posterior Lower forearm (Active)   Site Assessment [facility] 2/23/2020  7:40 AM   Line Status Infusing 2/23/2020  7:40 AM   Phlebitis Scale 0-->no symptoms 2/23/2020  7:40 AM   Infiltration Scale 0 2/23/2020  7:40 AM   Infiltration Site Treatment Method  None 2/23/2020  7:40 AM   Extravasation? No 2/23/2020  7:40 AM   Number of days: 8       Peripheral IV 02/20/20 Left Upper arm (Active)   Site Assessment [facility] 2/23/2020  7:40 AM   Line Status Infusing 2/23/2020  7:40 AM   Phlebitis Scale 0-->no symptoms 2/23/2020  7:40 AM   Infiltration Scale 0 2/23/2020  7:40 AM   Infiltration Site Treatment Method  None 2/23/2020  7:40 AM   Extravasation? No 2/23/2020  7:40 AM   Number of days: 3       Urethral Catheter Non-latex 16 fr (Active)   Tube Description Positional 2/22/2020  4:24 PM   Catheter Care Done 2/22/2020  8:00 PM   Collection Container Standard 2/23/2020 12:00 AM   Securement Method Securing device (Describe) 2/23/2020 12:00 AM   Rationale for Continued Use Strict 1-2 Hour I&O 2/23/2020  4:52 AM   Urine Output 325 mL 2/23/2020  7:17 AM   Number of days: 9       Wound 02/19/20 Posterior;Right Ischial tuberosity Other  (comment) (Active)   Site Assessment UTV 2/23/2020 12:00 AM   Drainage Amount None 2/22/2020  3:24 PM   Dressing Foam 2/22/2020  3:24 PM   Dressing Status Clean, dry, intact 2/23/2020 12:00 AM   Number of days: 4     No line/device    Constitutional: Alert and oriented x3. No distress    HEENT: Normocephalic/atraumatic, PERRL, EOMI, mouth moist, neck supple, no LN.     Cardiovascular: RRR. no Murmur, no  rubs, or gallops.  JVD no.  Bruits no.  Pulses 2+    Respiratory: No wheezing.  Still some increased respiratory effort.  Otherwise clear to auscultation bilaterally    Abdomen: Soft, nontender, nondistended, no organomegaly. Bowel sounds present    Extremities: Warm/dry. No edema    Neuro:   Non focal, cranial nerves intact, Moves all extremities.  Medications     dextrose       lactated ringers 75 mL/hr at 02/22/20 1334       amLODIPine  10 mg Oral Daily     cefTRIAXone  1 g Intravenous Q24H     cloNIDine   Transdermal Q8H     cloNIDine  1 patch Transdermal Weekly     doxycycline  intermittent infusion  100 mg Intravenous Q12H     enoxaparin ANTICOAGULANT  1 mg/kg Subcutaneous Q12H     insulin aspart  1-6 Units Subcutaneous Q4H     ipratropium - albuterol 0.5 mg/2.5 mg/3 mL  3 mL Nebulization 4x daily     metoprolol tartrate  25 mg Oral BID     nicotine   Transdermal Q8H     pantoprazole  40 mg Oral QAM AC     predniSONE  40 mg Oral Daily       Data   Recent Labs   Lab 02/23/20  0445 02/22/20  0426 02/21/20  0452 02/20/20  0450   WBC 15.3* 15.5* 13.7* 9.5   HGB 14.3 15.4 14.9 13.9   MCV 95 92 95 93    213 174 152    141 140 143   POTASSIUM 4.3 4.4  4.3 4.1 3.8   CHLORIDE 112* 111* 108 110*   CO2 25 26 27 29   BUN 33* 33* 29 33*   CR 0.63* 0.63* 0.62* 0.59*   ANIONGAP 5 4 5 4   CARLTON 8.3* 8.4* 8.3* 8.2*   * 180*  177* 158* 175*   ALBUMIN 2.9* 3.0* 3.0* 2.7*   PROTTOTAL  --   --  6.5* 5.9*   BILITOTAL  --   --  0.6 0.6   ALKPHOS  --   --  48 45   ALT  --   --  45 30   AST  --   --  37 29       No  results found for this or any previous visit (from the past 24 hour(s)).

## 2020-02-23 NOTE — PLAN OF CARE
"Pt A&O able to make needs known. Denies pain. Denies nausea. Pt up to chair with assist x3 people today. Pt complained of feeling \"weak In the legs\" but pt was able to stand. Pt up to chair for most of shift. Pt passed dysphagia screen. Pt tolerating regular diet and thin liquids, pt able to feed himself after tray set up. Family visiting throughout the day. Reinforced calling for assistance. Pt turned and repositioned and shifted in chair often. Pt had a medium formed brown BM. Pt showered this shift with assist x2. Clonidine patch and nicotine patch in place. Pts wife to bring him dinner tonight.       Oxygen at 3L NC with sats 92-94%  LS clear  Foam dressing CDI, replaced after shower  IV on right side running LR at 75ml/hr  IV on left running NS TKO    NSR with PVC's 80's-110's    Gill removed at 1200 and pt voided a large amount of yellow urine at 1620    Hourly rounding      Problem: Adult Inpatient Plan of Care  Goal: Plan of Care Review  2/23/2020 1412 by Barbie Ferrer RN  Outcome: Improving  Goal: Patient-Specific Goal (Individualization)  2/23/2020 1412 by Barbie Ferrer RN  Outcome: Improving  Goal: Absence of Hospital-Acquired Illness or Injury  2/23/2020 1412 by Barbie Ferrer RN  Outcome: Improving  Goal: Optimal Comfort and Wellbeing  2/23/2020 1412 by Barbie Ferrer RN  Outcome: Improving  Goal: Readiness for Transition of Care  2/23/2020 1412 by Barbie Ferrer RN  Outcome: Improving  Goal: Rounds/Family Conference  2/23/2020 1412 by Barbie Ferrer RN  Outcome: Improving    Face to face report given with opportunity to observe patient.    Report given to Annie Ferrer RN   2/23/2020  6:55 PM         "

## 2020-02-24 ENCOUNTER — APPOINTMENT (OUTPATIENT)
Dept: SPEECH THERAPY | Facility: HOSPITAL | Age: 55
DRG: 207 | End: 2020-02-24
Payer: COMMERCIAL

## 2020-02-24 ENCOUNTER — APPOINTMENT (OUTPATIENT)
Dept: PHYSICAL THERAPY | Facility: HOSPITAL | Age: 55
DRG: 207 | End: 2020-02-24
Payer: COMMERCIAL

## 2020-02-24 ENCOUNTER — APPOINTMENT (OUTPATIENT)
Dept: OCCUPATIONAL THERAPY | Facility: HOSPITAL | Age: 55
DRG: 207 | End: 2020-02-24
Attending: INTERNAL MEDICINE
Payer: COMMERCIAL

## 2020-02-24 LAB
ALBUMIN SERPL-MCNC: 2.7 G/DL (ref 3.4–5)
ANION GAP SERPL CALCULATED.3IONS-SCNC: 4 MMOL/L (ref 3–14)
BUN SERPL-MCNC: 32 MG/DL (ref 7–30)
CALCIUM SERPL-MCNC: 7.8 MG/DL (ref 8.5–10.1)
CHLORIDE SERPL-SCNC: 111 MMOL/L (ref 94–109)
CO2 SERPL-SCNC: 26 MMOL/L (ref 20–32)
CREAT SERPL-MCNC: 0.6 MG/DL (ref 0.66–1.25)
ERYTHROCYTE [DISTWIDTH] IN BLOOD BY AUTOMATED COUNT: 13.2 % (ref 10–15)
GFR SERPL CREATININE-BSD FRML MDRD: >90 ML/MIN/{1.73_M2}
GLUCOSE BLDC GLUCOMTR-MCNC: 111 MG/DL (ref 70–99)
GLUCOSE BLDC GLUCOMTR-MCNC: 112 MG/DL (ref 70–99)
GLUCOSE BLDC GLUCOMTR-MCNC: 134 MG/DL (ref 70–99)
GLUCOSE SERPL-MCNC: 99 MG/DL (ref 70–99)
HCT VFR BLD AUTO: 38 % (ref 40–53)
HGB BLD-MCNC: 12.9 G/DL (ref 13.3–17.7)
LACTATE BLD-SCNC: 1 MMOL/L (ref 0.7–2)
MAGNESIUM SERPL-MCNC: 2.2 MG/DL (ref 1.6–2.3)
MCH RBC QN AUTO: 31.7 PG (ref 26.5–33)
MCHC RBC AUTO-ENTMCNC: 33.9 G/DL (ref 31.5–36.5)
MCV RBC AUTO: 93 FL (ref 78–100)
PHOSPHATE SERPL-MCNC: 2.5 MG/DL (ref 2.5–4.5)
PLATELET # BLD AUTO: 172 10E9/L (ref 150–450)
POTASSIUM SERPL-SCNC: 4.2 MMOL/L (ref 3.4–5.3)
RBC # BLD AUTO: 4.07 10E12/L (ref 4.4–5.9)
SODIUM SERPL-SCNC: 141 MMOL/L (ref 133–144)
WBC # BLD AUTO: 13.5 10E9/L (ref 4–11)

## 2020-02-24 PROCEDURE — 97166 OT EVAL MOD COMPLEX 45 MIN: CPT | Mod: GO

## 2020-02-24 PROCEDURE — 25000125 ZZHC RX 250: Performed by: INTERNAL MEDICINE

## 2020-02-24 PROCEDURE — 94640 AIRWAY INHALATION TREATMENT: CPT

## 2020-02-24 PROCEDURE — 83605 ASSAY OF LACTIC ACID: CPT | Performed by: INTERNAL MEDICINE

## 2020-02-24 PROCEDURE — 85027 COMPLETE CBC AUTOMATED: CPT | Performed by: INTERNAL MEDICINE

## 2020-02-24 PROCEDURE — 25000132 ZZH RX MED GY IP 250 OP 250 PS 637: Performed by: INTERNAL MEDICINE

## 2020-02-24 PROCEDURE — 97110 THERAPEUTIC EXERCISES: CPT | Mod: GP

## 2020-02-24 PROCEDURE — 97530 THERAPEUTIC ACTIVITIES: CPT | Mod: GO

## 2020-02-24 PROCEDURE — 12000000 ZZH R&B MED SURG/OB

## 2020-02-24 PROCEDURE — 00000146 ZZHCL STATISTIC GLUCOSE BY METER IP

## 2020-02-24 PROCEDURE — 40000275 ZZH STATISTIC RCP TIME EA 10 MIN

## 2020-02-24 PROCEDURE — 99232 SBSQ HOSP IP/OBS MODERATE 35: CPT | Performed by: INTERNAL MEDICINE

## 2020-02-24 PROCEDURE — 80069 RENAL FUNCTION PANEL: CPT | Performed by: INTERNAL MEDICINE

## 2020-02-24 PROCEDURE — 97161 PT EVAL LOW COMPLEX 20 MIN: CPT | Mod: GP

## 2020-02-24 PROCEDURE — 25000131 ZZH RX MED GY IP 250 OP 636 PS 637: Performed by: INTERNAL MEDICINE

## 2020-02-24 PROCEDURE — 94640 AIRWAY INHALATION TREATMENT: CPT | Mod: 76

## 2020-02-24 PROCEDURE — 36415 COLL VENOUS BLD VENIPUNCTURE: CPT | Performed by: INTERNAL MEDICINE

## 2020-02-24 PROCEDURE — 83735 ASSAY OF MAGNESIUM: CPT | Performed by: INTERNAL MEDICINE

## 2020-02-24 PROCEDURE — 25000128 H RX IP 250 OP 636: Performed by: INTERNAL MEDICINE

## 2020-02-24 PROCEDURE — 25800030 ZZH RX IP 258 OP 636: Performed by: INTERNAL MEDICINE

## 2020-02-24 PROCEDURE — 92526 ORAL FUNCTION THERAPY: CPT | Mod: GN

## 2020-02-24 RX ORDER — ALBUTEROL SULFATE 0.83 MG/ML
2.5 SOLUTION RESPIRATORY (INHALATION)
Status: DISCONTINUED | OUTPATIENT
Start: 2020-02-24 | End: 2020-02-27 | Stop reason: HOSPADM

## 2020-02-24 RX ADMIN — SODIUM CHLORIDE, POTASSIUM CHLORIDE, SODIUM LACTATE AND CALCIUM CHLORIDE: 600; 310; 30; 20 INJECTION, SOLUTION INTRAVENOUS at 13:16

## 2020-02-24 RX ADMIN — IPRATROPIUM BROMIDE AND ALBUTEROL SULFATE 3 ML: .5; 3 SOLUTION RESPIRATORY (INHALATION) at 16:20

## 2020-02-24 RX ADMIN — PANTOPRAZOLE SODIUM 40 MG: 40 TABLET, DELAYED RELEASE ORAL at 08:37

## 2020-02-24 RX ADMIN — ENOXAPARIN SODIUM 90 MG: 100 INJECTION SUBCUTANEOUS at 19:14

## 2020-02-24 RX ADMIN — METOPROLOL TARTRATE 25 MG: 25 TABLET ORAL at 20:19

## 2020-02-24 RX ADMIN — METOPROLOL TARTRATE 25 MG: 25 TABLET ORAL at 08:37

## 2020-02-24 RX ADMIN — IPRATROPIUM BROMIDE AND ALBUTEROL SULFATE 3 ML: .5; 3 SOLUTION RESPIRATORY (INHALATION) at 12:00

## 2020-02-24 RX ADMIN — AMLODIPINE BESYLATE 10 MG: 5 TABLET ORAL at 08:36

## 2020-02-24 RX ADMIN — ENOXAPARIN SODIUM 90 MG: 100 INJECTION SUBCUTANEOUS at 05:32

## 2020-02-24 RX ADMIN — IPRATROPIUM BROMIDE AND ALBUTEROL SULFATE 3 ML: .5; 3 SOLUTION RESPIRATORY (INHALATION) at 08:23

## 2020-02-24 RX ADMIN — PREDNISONE 40 MG: 20 TABLET ORAL at 08:36

## 2020-02-24 ASSESSMENT — ACTIVITIES OF DAILY LIVING (ADL)
ADLS_ACUITY_SCORE: 19
ADLS_ACUITY_SCORE: 19
ADLS_ACUITY_SCORE: 18
ADLS_ACUITY_SCORE: 19

## 2020-02-24 ASSESSMENT — MIFFLIN-ST. JEOR: SCORE: 1773.26

## 2020-02-24 NOTE — PLAN OF CARE
Discharge Planner PT   Patient plan for discharge: Home or TCU  Current status: MaxA x 2 for transfers  Barriers to return to prior living situation: Independent walking/stairs  Recommendations for discharge: If patient is able to progress to walking w/ FWW in next 2 days will likely recommend home.  As it stands now will require short term SNF stay for general strengthening.  Rationale for recommendations: Based on MaxA x 2 for transfers.  Unable to stand him during session today.       Entered by: Shravan Gordon 02/24/2020 10:04 AM

## 2020-02-24 NOTE — PROGRESS NOTES
Spoke with Ry, [facility]. He has express scripts through his employer but needs a prior authorization to get this medication. Plan is to use a 30 day supply free coupon and [facility]. Also spoke to Ry about being able to ambulate at discharge or he will need to go to a SNF for rehab. CM remains available.

## 2020-02-24 NOTE — PLAN OF CARE
Discharge Planner OT   Patient plan for discharge: Home vs Short term rehab  Current status: Bed Mobility: Supine to sit EOB SBA  Transfer Skills: Anita x2 sit to stand from EOB  Bed to Chair/Chair to Bed: Anita x2  Toilet Transfer: Anita x2 (commode)  Lower Body Dressing: Pt able to don socks with SBA, he needed Anita to don pull ups due to difficulties getting started over his feet  Toileting: IND with urination. Pt needed Anita to assist with clothing management  *Pt expressed how he was fearful of getting up and moving due to deconditioning and being bed bound for a week.   Pt was on no oxygen and O2 sats ranged from 86-91%. Nursing in room and was assessing lungs but kept oxygen off.   Barriers to return to prior living situation: Deconditioning, weakness, fear, multi level home, spouse working (busy season too)  Recommendations for discharge: Short term rehab, unless pt significantly progresses in the next day or so  Rationale for recommendations: Pt tolerated OT today fairly well. He needed a little bit of encouragement and reassurance due to fear but then was agreeable to getting up out of bed. Pt requiring minimal assistance x1-2 for ADLs/functional mobility and demonstrates decreased endurance. Pt previously pretty active and independent. He really wants to return to work in 10 days. After evaluation today, recommend short term rehab as pt has great motivation to continue working on progressing his functional capacity and this would be pt's safest option to help him to reach his goals. Pt appears to have made gains over 1-2 days, therefore, will monitor how pt does tomorrow and if he continues to progress enough, he could possibly return home with assist. Plan to treat pt during his stay.        Entered by: Shanique Pereira 02/24/2020 1:21 PM

## 2020-02-24 NOTE — PROGRESS NOTES
[facility]    Hospitalist Progress Note      Assessment & Plan   Ry Man is a 54 year old male who was admitted on 2/14/2020.      1.  Acute respiratory failure with hyper hypoxia and hypercapnia: Patient intubated 4 days and extubated.  Currently is on room air.  He looks much less dyspneic.  Continues to work on pulmonary toilet.    2.  COPD exacerbation: He was positive for influenza A which causes exacerbation.  Treated adequately with antibiotics no clear-cut bacterial infection was ever seen there was a been not been discontinued.  He has been placed on oral prednisone today.  At home he was only using I think albuterol inhaler.  Will restart that with a spacer.  Have nebs as needed.    3.  Influenza A: Was treated for 9 days subsequently Tamiflu is not been discontinued.    4.  Presumed pulmonary embolism: Initial CT scan was very suggestive of PE.  Has been on full dose Lovenox.  Do need to change him over to oral anticoagulant.  Discussed options and he would rather be on Xarelto.  He gets his medications through the [facility] have case management help us out in terms of checking on this.  But they have covered in the past.    5.  Hypertension: Has longstanding history of this.  Currently is on amlodipine Catapres and metoprolol.    6.  Hyperglycemia: Resolved now is off the IV steroids.  We will stop the Accu-Cheks.    7.  Disposition: Looks much more normal today no real respiratory distress has been a little reluctant to get up and walk with PT.  I admonished him he has to get up and start moving otherwise he will require structured nursing facility has a be too weak to even go home.  He will be transferred out of ICU will discontinue all the telemetry and IV fluids and enable him to get up and move better.  So anticipate possible discharge in the next 1 to 2 days.      Diet: Regular Diet Adult  Fluids: IV lock    DVT Prophylaxis: Enoxaparin (Lovenox) SQ  Code Status: Full  Code    Disposition: Expected discharge in 1-2 days once safely ambulating.    Jw Sinha    Interval History   Alert appropriate says feels markedly better.  Denies any chest pains abdominal pain.  Breathing is much better.  Is currently on room air.  Blood pressure stable.  Discussed options for anticoagulation he would prefer Xarelto.  Will be moving out of ICU today continue with ambulation and aggressive pulmonary toilet.    -Data reviewed today: I reviewed all new labs and imaging results over the last 24 hours. I personally reviewed no images or EKG's today.    Physical Exam   Temp: 98.1  F (36.7  C) Temp src: Tympanic BP: (!) 145/101 Pulse: 72 Heart Rate: 80 Resp: 22 SpO2: (!) 91 % O2 Device: None (Room air) Oxygen Delivery: 1 LPM  Vitals:    02/20/20 0608 02/23/20 0400 02/24/20 0534   Weight: 95.5 kg (210 lb 8.6 oz) 87.9 kg (193 lb 12.6 oz) 89.2 kg (196 lb 10.4 oz)     Vital Signs with Ranges  Temp:  [97.9  F (36.6  C)-99.3  F (37.4  C)] 98.1  F (36.7  C)  Pulse:  [68-98] 72  Heart Rate:  [] 80  Resp:  [11-32] 22  BP: (117-175)/() 145/101  SpO2:  [89 %-98 %] 91 %  I/O last 3 completed shifts:  In: 1632 [P.O.:340; I.V.:1292]  Out: 1175 [Urine:1175]    Peripheral IV 02/15/20 Right;Posterior Lower forearm (Active)   Site Assessment [facility] 2/24/2020 11:56 AM   Line Status Infusing 2/24/2020 11:56 AM   Phlebitis Scale 0-->no symptoms 2/24/2020 11:56 AM   Infiltration Scale 0 2/24/2020 11:56 AM   Infiltration Site Treatment Method  None 2/23/2020  8:45 PM   Extravasation? No 2/23/2020  8:45 PM   Number of days: 9       Peripheral IV 02/20/20 Left Upper arm (Active)   Site Assessment [facility] 2/24/2020 11:56 AM   Line Status Saline locked 2/24/2020 11:56 AM   Phlebitis Scale 0-->no symptoms 2/24/2020 11:56 AM   Infiltration Scale 0 2/24/2020 11:56 AM   Infiltration Site Treatment Method  None 2/23/2020  8:45 PM   Extravasation? No 2/23/2020  6:14 PM   Number of days: 4       Wound 02/19/20 Posterior;Right  Ischial tuberosity Other (comment) (Active)   Site Assessment UTV 2/24/2020 11:30 AM   Mandy-wound Assessment WDL 2/24/2020 11:30 AM   Drainage Amount None 2/24/2020 11:30 AM   Dressing Foam 2/24/2020 11:30 AM   Dressing Status Clean, dry, intact 2/24/2020 11:30 AM   Number of days: 5     No line/device    Constitutional: Alert and oriented x3. No distress    HEENT: Normocephalic/atraumatic, PERRL, EOMI, mouth moist, neck supple, no LN.     Cardiovascular: RRR. no Murmur, no  rubs, or gallops.  JVD no.  Bruits no.  Pulses 3+    Respiratory:Some expiratory wheezes otherwiseClear to auscultation bilaterally    Abdomen: Soft, nontender, nondistended, no organomegaly. Bowel sounds present    Extremities: Warm/dry. No edema    Neuro:   Non focal, cranial nerves intact, Moves all extremities.  Medications     dextrose       lactated ringers 75 mL/hr at 02/24/20 1316       amLODIPine  10 mg Oral Daily     cloNIDine   Transdermal Q8H     cloNIDine  1 patch Transdermal Weekly     enoxaparin ANTICOAGULANT  1 mg/kg Subcutaneous Q12H     insulin aspart  1-6 Units Subcutaneous Q4H     ipratropium - albuterol 0.5 mg/2.5 mg/3 mL  3 mL Nebulization 4x daily     metoprolol tartrate  25 mg Oral BID     nicotine   Transdermal Q8H     pantoprazole  40 mg Oral QAM AC     predniSONE  40 mg Oral Daily       Data   Recent Labs   Lab 02/24/20  0440 02/23/20  0445 02/22/20  0426 02/21/20  0452 02/20/20  0450   WBC 13.5* 15.3* 15.5* 13.7* 9.5   HGB 12.9* 14.3 15.4 14.9 13.9   MCV 93 95 92 95 93    190 213 174 152    142 141 140 143   POTASSIUM 4.2 4.3 4.4  4.3 4.1 3.8   CHLORIDE 111* 112* 111* 108 110*   CO2 26 25 26 27 29   BUN 32* 33* 33* 29 33*   CR 0.60* 0.63* 0.63* 0.62* 0.59*   ANIONGAP 4 5 4 5 4   CARLTON 7.8* 8.3* 8.4* 8.3* 8.2*   GLC 99 180* 180*  177* 158* 175*   ALBUMIN 2.7* 2.9* 3.0* 3.0* 2.7*   PROTTOTAL  --   --   --  6.5* 5.9*   BILITOTAL  --   --   --  0.6 0.6   ALKPHOS  --   --   --  48 45   ALT  --   --   --  45 30    AST  --   --   --  37 29       No results found for this or any previous visit (from the past 24 hour(s)).

## 2020-02-24 NOTE — PROGRESS NOTES
"Inpatient Occupational Therapy Evaluation    Name: Ry Man MRN# [id number]   Age: 54 year old YOB: 1965     Date of Consultation: 2020  Consultation is requested by:  Dr. Gonzalez  Specific Consultation Request:  Critical illness/myopathy  Primary care provider: Rani Ref-Primary, Physician    General Information:   Onset Date: 2020    History of Current Problem/Admission: Influenza A [J10.1]  Acute respiratory failure with hypoxia (H) [J96.01]    Prior Level of Function: Pt previously independent in ADLs and did not use an assistive device for functional mobility.   Ambulation: 0 - Independent   Transferrin - Independent   Toiletin - Independent    Bathin - Independent   Dressin - Independent   Eatin - Independent   Communication: 0 - Understands/communicates without difficulty  Swallowin - swallows foods/liquids without difficulty  Cognition: 0 - no cognitive issues reported    Fall history within the last 6 months: No    Current Living Situation: Pt lives with his wife in a house with ~3 steps to enter. There are steps to the basement where laundry is located, as well as to the second level where pt's bedroom is located. There are toilets on the main floor and second level, both standard height without grab bars. The shower/tub combo is on the first floor. No grab bars and pt stands to bathe.    Current Equipment Used at Home: None     Patient & Family Goals: Pt stated, \"I want out of here. I want my life back\". Pt's overall goal is to return home but is aware he may need short term rehab. Pt also states his crew at work starts their 9 days off tomorrow and he would like to return to work once his shift starts up again in 9 days.      Past Medical History:   Past Medical History:   Diagnosis Date     Abnormal liver function test 2011     Bronchitis, not specified as acute or chronic 2005     Osteoarthritis 2011     Tobacco abuse 2011     " Unspecified essential hypertension 9/1/2006       Past Surgical History:  Past Surgical History:   Procedure Laterality Date     cysts removed from neck      Bilateral     vasectomy       wisdom teeth extraction         Medications:   Current Facility-Administered Medications   Medication     acetaminophen (TYLENOL) tablet 650 mg     albuterol (PROAIR HFA/PROVENTIL HFA/VENTOLIN HFA) 108 (90 Base) MCG/ACT inhaler 6 puff     albuterol (PROVENTIL) neb solution 2.5 mg     amLODIPine (NORVASC) tablet 10 mg     benzocaine-menthol (CEPACOL) 15-3.6 MG lozenge 1 lozenge     bisacodyl (DULCOLAX) EC tablet 5 mg    Or     bisacodyl (DULCOLAX) Suppository 10 mg     cloNIDine (CATAPRES-TTS) Patch in Place     cloNIDine (CATAPRES-TTS3) 0.3 MG/24HR WK patch 1 patch     dextrose 10% infusion     glucose gel 15-30 g    Or     dextrose 50 % injection 25-50 mL    Or     glucagon injection 1 mg     enoxaparin ANTICOAGULANT (LOVENOX) injection 90 mg     fentaNYL (PF) (SUBLIMAZE) injection 50 mcg     guaiFENesin-codeine (ROBITUSSIN AC) 100-10 MG/5ML solution 5 mL     HYDROmorphone (PF) (DILAUDID) injection 0.3-0.5 mg     insulin aspart (NovoLOG) inj (RAPID ACTING)     ipratropium - albuterol 0.5 mg/2.5 mg/3 mL (DUONEB) neb solution 3 mL     lactated ringers infusion     magnesium hydroxide (MILK OF MAGNESIA) suspension 30 mL     metoprolol tartrate (LOPRESSOR) tablet 25 mg     morphine (PF) injection 2-4 mg     naloxone (NARCAN) injection 0.1-0.4 mg     nicotine (NICODERM CQ) 21 MG/24HR 24 hr patch 1 patch     nicotine Patch in Place     ondansetron (ZOFRAN-ODT) ODT tab 4 mg    Or     ondansetron (ZOFRAN) injection 4 mg     pantoprazole (PROTONIX) EC tablet 40 mg     potassium phosphate 15 mmol in D5W 250 mL intermittent infusion     potassium phosphate 20 mmol in D5W 250 mL intermittent infusion     potassium phosphate 20 mmol in D5W 500 mL intermittent infusion     potassium phosphate 25 mmol in D5W 500 mL intermittent infusion      predniSONE (DELTASONE) tablet 40 mg     sennosides (SENOKOT) tablet 8.6 mg     sodium chloride (OCEAN) 0.65 % nasal spray 1 spray       Weight Bearing Status: FWB     Cognitive Status Examination:  Orientation: oriented to time, place and person  Level of Consciousness: alert  Follows Commands and Answers Questions: 100% of the time  Personal Safety and Judgement: Intact  Memory: Immediate recall intact and Long-term memory intact    Pain:   Currently in pain? Yes  Pain Location? Bilateral arms - all bruised do to IV's; pain in L groin, which pt states is from RA  Pain Rating: mild    Occupational Therapy Evaluation:   Integumentary/Edema: Bilateral forearms significantly bruised (see above)   Range of Motion: WFL   Strength: WFL - generalized weakness noted  Muscle Tone Assessment: No issues observed  Coordination: Normal    Mobility:   Bed Mobility: Supine to sit EOB SBA  Transfer Skills: Anita x2 sit to stand from EOB  Bed to Chair/Chair to Bed: Anita x2  Toilet Transfer: Anita x2   Balance: Fair     ADLs:   Lower Body Dressing: Pt able to don socks with SBA, he needed Anita to don pull ups due to difficulties getting started over his feet  Toileting: IND with urination. Pt needed Anita to assist with clothing management    *Pt expressed how he was fearful of getting up and moving due to deconditioning and being bed bound for a week.   Pt was on no oxygen and O2 sats ranged from 86-91%. Nursing in room and was assessing lungs but kept oxygen off.     IADLs:   Previous Responsibilities of the Patient: Meal Prep, Housekeeping, Laundry, Shopping, Yard Work, Medication Management, Finances , Driving  and Work   Comments: Pt's wife also drives, works, and does the shopping.      Activities of Daily Living Analysis:   Impairments Contributing to Impaired Activities of Daily Living: Balance impaired , fear and anxiety , pain, activity tolerance decreased and strength decreased    Occupational Therapy Interventions: ADL  Retraining , strengthening , progressive activity/exercise and risk factor education     Clinical Impressions:  Criteria for Skilled Therapeutic Intervention Met: Yes, treatment indicated  OT Diagnosis: Impaired ADLs  Influenced by the following impairments: Fear/anxiety, weakness, deconditioning all due to prolonged hospitalization   Functional limitations due to impairment: Dressing, bathing, grooming, toileting, functional mobility, IADLs  Clinical presentation: Evolving/Changing  Clinical presentation rationale: Clinical judgement  Clinical Decision making (complexity): Moderate Complexity  Frequency: 5 times/week  Predicted Duration of Therapy Intervention (days/wks): 5 days  Anticipated Discharge Disposition: Transitional Care Facility   Anticipated Equipment Needs at Discharge: TBD  Risks and Benefits of therapy have been explained: Yes  Patient, Family & other staff in agreement with plan of care: Yes  Clinical Impression Comments: Pt tolerated OT today fairly well. He needed a little bit of encouragement and reassurance due to fear but then was agreeable to getting up out of bed. Pt requiring minimal assistance x1-2 for ADLs/functional mobility and demonstrates decreased endurance. Pt previously pretty active and independent. He really wants to return to work in 10 days. After evaluation today, recommend short term rehab as pt has great motivation to continue working on progressing his functional capacity and this would be pt's safest option to help him to reach his goals. Pt appears to have made gains over 1-2 days, therefore, will monitor how pt does tomorrow and if he continues to progress enough, he could possibly return home with assist. Plan to treat pt during his stay.     Total Eval Time: 15

## 2020-02-24 NOTE — PLAN OF CARE
Discharge Planner SLP   Patient plan for discharge: subacute rehab  Current status: Regular diet (NDD3 avoid peanuts, seeds), clinical signs of dysphagia (inconsistent coughing during intake, reports of pharyngeal residue), aspiration precautions  Barriers to return to prior living situation: generalized swallow weakness and low intake  Recommendations for discharge: subacute rehab vs OP services  Rationale for recommendations: Patient at moderate risk for aspiration and malnutrition due to generalized swallow weakness and limited swallow endurance and intake.         Entered by: Shante Wayne 02/24/2020 8:49 AM

## 2020-02-24 NOTE — PROGRESS NOTES
Inpatient Physical Therapy Evaluation    Name: Ry Man MRN# [id number]   Age: 54 year old YOB: 1965     Date of Consultation: 2020  Consultation is requested by:  Dr. Gonzalez  Specific Consultation Request:  Critical Illness Myopathy  Primary care provider: Rani Ref-Primary, Physician    General Information:   Onset Date: 20    History of Current Problem/Admission: Influenza A [J10.1]  Acute respiratory failure with hypoxia (H) [J96.01]    Prior Level of Function: Independent in all tasks, worked as  at [employer].    Ambulation: 0 - Independent   Transferrin - Independent   Toiletin - Independent    Bathin - Independent   Dressin - Independent   Eatin - Independent   Communication: 0 - Understands/communicates without difficulty  Swallowin - swallows foods/liquids without difficulty  Cognition: 0 - no cognitive issues reported    Fall history within the last 6 months: No    Current Living Situation: Patient lives in a home w/ stairs to enter his home w/ hand rails.  Was not able to discuss all constraints to prior living situation.      Current Equipment Used at Home: none     Patient & Family Goals: To return home and back to work in 30 days      Past Medical History:   Past Medical History:   Diagnosis Date     Abnormal liver function test 2011     Bronchitis, not specified as acute or chronic 2005     Osteoarthritis 2011     Tobacco abuse 2011     Unspecified essential hypertension 2006       Past Surgical History:  Past Surgical History:   Procedure Laterality Date     cysts removed from neck      Bilateral     vasectomy       wisdom teeth extraction         Medications:   Current Facility-Administered Medications   Medication     acetaminophen (TYLENOL) tablet 650 mg     albuterol (PROAIR HFA/PROVENTIL HFA/VENTOLIN HFA) 108 (90 Base) MCG/ACT inhaler 6 puff     albuterol (PROVENTIL) neb solution 2.5 mg     amLODIPine  (NORVASC) tablet 10 mg     benzocaine-menthol (CEPACOL) 15-3.6 MG lozenge 1 lozenge     bisacodyl (DULCOLAX) EC tablet 5 mg    Or     bisacodyl (DULCOLAX) Suppository 10 mg     cloNIDine (CATAPRES-TTS) Patch in Place     cloNIDine (CATAPRES-TTS3) 0.3 MG/24HR WK patch 1 patch     dextrose 10% infusion     glucose gel 15-30 g    Or     dextrose 50 % injection 25-50 mL    Or     glucagon injection 1 mg     enoxaparin ANTICOAGULANT (LOVENOX) injection 90 mg     fentaNYL (PF) (SUBLIMAZE) injection 50 mcg     guaiFENesin-codeine (ROBITUSSIN AC) 100-10 MG/5ML solution 5 mL     HYDROmorphone (PF) (DILAUDID) injection 0.3-0.5 mg     insulin aspart (NovoLOG) inj (RAPID ACTING)     ipratropium - albuterol 0.5 mg/2.5 mg/3 mL (DUONEB) neb solution 3 mL     lactated ringers infusion     magnesium hydroxide (MILK OF MAGNESIA) suspension 30 mL     metoprolol tartrate (LOPRESSOR) tablet 25 mg     morphine (PF) injection 2-4 mg     naloxone (NARCAN) injection 0.1-0.4 mg     nicotine (NICODERM CQ) 21 MG/24HR 24 hr patch 1 patch     nicotine Patch in Place     ondansetron (ZOFRAN-ODT) ODT tab 4 mg    Or     ondansetron (ZOFRAN) injection 4 mg     pantoprazole (PROTONIX) EC tablet 40 mg     potassium phosphate 15 mmol in D5W 250 mL intermittent infusion     potassium phosphate 20 mmol in D5W 250 mL intermittent infusion     potassium phosphate 20 mmol in D5W 500 mL intermittent infusion     potassium phosphate 25 mmol in D5W 500 mL intermittent infusion     predniSONE (DELTASONE) tablet 40 mg     sennosides (SENOKOT) tablet 8.6 mg     sodium chloride (OCEAN) 0.65 % nasal spray 1 spray       Weight Bearing Status: No Restrictions     Cognitive Status Examination:  Orientation: awake and alert  Level of Consciousness: alert  Follows Commands and Answers Questions: 100% of the time  Personal Safety and Judgement: Intact  Memory: Immediate recall intact  Comments: Poor motivation to stand     Pain:   Currently in pain? Yes  Pain Location?  Arms, legs, hips  Pain Ratin    Physical Therapy Evaluation:   Integumentary/Edema: Significant bruising up B arms   ROM: WFL  Strength: WFL 3/5 gross motor strength for legs   Bed Mobility: NT - up in chair at start of session  Transfers: MaxA x 2 per nursing  Gait: Refused  Stairs: Refused  Balance: Refused  Sensory: intact  Coordination: NT    Physical Therapy Interventions: Strengthening, Transfer Training and Progressive Activity/Exercise     Clinical Impressions:  Criteria for Skilled Therapeutic Intervention Met: Yes, treatment indicated  PT Diagnosis: General Weakness  Influenced by the following impairments: LE weakness, Decreased activity tolerance, gait impairments  Functional limitations due to impairment: Transfers, Walking, Standing, Bed mobility  Clinical presentation: Evolving/Changing  Clinical presentation rationale: Therapist Discretion  Clinical Decision making (complexity): Low Complexity  Frequency: 6 times/week  Predicted Duration of Therapy Intervention (days/wks): 5 days  Anticipated Discharge Disposition: Transitional Care Facility   Anticipated Equipment Needs at Discharge:   Risks and Benefits of therapy have been explained: Yes  Patient, Family & other staff in agreement with plan of care: Yes  Clinical Impression Comments: Patient now medically stable, but still apprehensive to start movement due to arm and leg pain associated w/ IV's and shots over the last 9 days.  Per nursing MaxA x 2 for pivot transfer, not willing to attempt transfer this morning.  If patient improves to ambulation w/ Mod(I) over next 2 days will recommend going home otherwise will need short term stay for general strengthening.    Total Eval Time: 15

## 2020-02-24 NOTE — PLAN OF CARE
VSS on 3lpm. Denies pain. Slept majority of the noc. Lung sounds clear with occasional expiratory wheezes. Range of motion to lower legs performed. Lovenox continues. IV to Left saline locked. Abx continue. LR at 75ml/hr. Clonidine and Nicotine patch in place. Tolerating regular diet and liquids. Using call light appropriately.     Face to face report given with opportunity to observe patient.    Report given to DAVID Cabello RN   2/24/2020  7:01 AM

## 2020-02-25 ENCOUNTER — APPOINTMENT (OUTPATIENT)
Dept: SPEECH THERAPY | Facility: HOSPITAL | Age: 55
DRG: 207 | End: 2020-02-25
Payer: COMMERCIAL

## 2020-02-25 ENCOUNTER — APPOINTMENT (OUTPATIENT)
Dept: PHYSICAL THERAPY | Facility: HOSPITAL | Age: 55
DRG: 207 | End: 2020-02-25
Payer: COMMERCIAL

## 2020-02-25 ENCOUNTER — APPOINTMENT (OUTPATIENT)
Dept: OCCUPATIONAL THERAPY | Facility: HOSPITAL | Age: 55
DRG: 207 | End: 2020-02-25
Payer: COMMERCIAL

## 2020-02-25 LAB
ERYTHROCYTE [DISTWIDTH] IN BLOOD BY AUTOMATED COUNT: 12.9 % (ref 10–15)
HCT VFR BLD AUTO: 34.7 % (ref 40–53)
HGB BLD-MCNC: 11.9 G/DL (ref 13.3–17.7)
MCH RBC QN AUTO: 31.5 PG (ref 26.5–33)
MCHC RBC AUTO-ENTMCNC: 34.3 G/DL (ref 31.5–36.5)
MCV RBC AUTO: 92 FL (ref 78–100)
PLATELET # BLD AUTO: 159 10E9/L (ref 150–450)
RBC # BLD AUTO: 3.78 10E12/L (ref 4.4–5.9)
WBC # BLD AUTO: 13.6 10E9/L (ref 4–11)

## 2020-02-25 PROCEDURE — 12000000 ZZH R&B MED SURG/OB

## 2020-02-25 PROCEDURE — 25000132 ZZH RX MED GY IP 250 OP 250 PS 637: Performed by: INTERNAL MEDICINE

## 2020-02-25 PROCEDURE — 85027 COMPLETE CBC AUTOMATED: CPT | Performed by: INTERNAL MEDICINE

## 2020-02-25 PROCEDURE — 25000125 ZZHC RX 250: Performed by: INTERNAL MEDICINE

## 2020-02-25 PROCEDURE — 97530 THERAPEUTIC ACTIVITIES: CPT | Mod: GP | Performed by: PHYSICAL THERAPIST

## 2020-02-25 PROCEDURE — 94640 AIRWAY INHALATION TREATMENT: CPT | Mod: 76

## 2020-02-25 PROCEDURE — 25000131 ZZH RX MED GY IP 250 OP 636 PS 637: Performed by: INTERNAL MEDICINE

## 2020-02-25 PROCEDURE — 97530 THERAPEUTIC ACTIVITIES: CPT | Mod: GO

## 2020-02-25 PROCEDURE — 36415 COLL VENOUS BLD VENIPUNCTURE: CPT | Performed by: INTERNAL MEDICINE

## 2020-02-25 PROCEDURE — 94640 AIRWAY INHALATION TREATMENT: CPT

## 2020-02-25 PROCEDURE — 92526 ORAL FUNCTION THERAPY: CPT | Mod: GN

## 2020-02-25 PROCEDURE — 25000128 H RX IP 250 OP 636: Performed by: INTERNAL MEDICINE

## 2020-02-25 PROCEDURE — 40000275 ZZH STATISTIC RCP TIME EA 10 MIN

## 2020-02-25 PROCEDURE — 99232 SBSQ HOSP IP/OBS MODERATE 35: CPT | Performed by: INTERNAL MEDICINE

## 2020-02-25 RX ADMIN — PREDNISONE 40 MG: 20 TABLET ORAL at 08:01

## 2020-02-25 RX ADMIN — IPRATROPIUM BROMIDE AND ALBUTEROL SULFATE 3 ML: .5; 3 SOLUTION RESPIRATORY (INHALATION) at 08:57

## 2020-02-25 RX ADMIN — ACETAMINOPHEN 650 MG: 325 TABLET, FILM COATED ORAL at 02:18

## 2020-02-25 RX ADMIN — AMLODIPINE BESYLATE 10 MG: 5 TABLET ORAL at 08:02

## 2020-02-25 RX ADMIN — ACETAMINOPHEN 650 MG: 325 TABLET, FILM COATED ORAL at 08:01

## 2020-02-25 RX ADMIN — ENOXAPARIN SODIUM 90 MG: 100 INJECTION SUBCUTANEOUS at 05:44

## 2020-02-25 RX ADMIN — METOPROLOL TARTRATE 25 MG: 25 TABLET ORAL at 20:42

## 2020-02-25 RX ADMIN — METOPROLOL TARTRATE 25 MG: 25 TABLET ORAL at 08:02

## 2020-02-25 RX ADMIN — RIVAROXABAN 15 MG: 15 TABLET, FILM COATED ORAL at 18:04

## 2020-02-25 RX ADMIN — IPRATROPIUM BROMIDE AND ALBUTEROL SULFATE 3 ML: .5; 3 SOLUTION RESPIRATORY (INHALATION) at 12:46

## 2020-02-25 RX ADMIN — IPRATROPIUM BROMIDE AND ALBUTEROL SULFATE 3 ML: .5; 3 SOLUTION RESPIRATORY (INHALATION) at 16:10

## 2020-02-25 RX ADMIN — PANTOPRAZOLE SODIUM 40 MG: 40 TABLET, DELAYED RELEASE ORAL at 08:02

## 2020-02-25 ASSESSMENT — ACTIVITIES OF DAILY LIVING (ADL)
ADLS_ACUITY_SCORE: 18
ADLS_ACUITY_SCORE: 19
ADLS_ACUITY_SCORE: 18
ADLS_ACUITY_SCORE: 14

## 2020-02-25 NOTE — PLAN OF CARE
Patient is alert and oriented x 4. Prn tylenol given for 8/10 leg pain. IV's are SL. Patient is repositioning self in bed. VSS, afebrile. LS are diminished throughout, on RA. Productive cough. Patient sleeping between cares. Up to chair with assist of 1 for breakfast.

## 2020-02-25 NOTE — PLAN OF CARE
Alert, oriented. VSS. Weaned to room air today. Lungs coarse to auscultation at times. Productive cough. Denies pain. IVs saline locked. Poor appetite but tolerates diet. Up in chair with assist x1-2. Declines walking with PT today. Will reassess tomorrow. Lots of bruising to bilateral arms. Makes needs known. Will continue to monitor.    Face to face report given with opportunity to observe patient.    Report given to Ariana Seaman RN   2/24/2020  11:14 PM

## 2020-02-25 NOTE — PROGRESS NOTES
[facility]    Hospitalist Progress Note      Assessment & Plan   Ry Man is a 54 year old male who was admitted on 2/14/2020.      1.  Acute respiratory failure with hypoxia and hypercapnia: Patient continues to do very well he is on room air at this time.  Does not appear to be overly dyspneic whatsoever.  Continue with bronchodilators and working on his pulmonary toilet.    2.  COPD exacerbation: Was positive for influenza A likely the inciting event for his respiratory failure.  Been treated adequately with the Tamiflu and antibiotics for although no clear-cut bacterial infection.  He is on oral prednisone which will start tapering down.  Continue with his bronchodilators.  Less wheezing today.    3.  Influenza A: Adequately treated with Tamiflu.    4.  Pulmonary embolism: Patient is being treated with subcu Lovenox.  Changing over to Xarelto today will be on 15 twice daily for 3 weeks then 20 mg daily for a total of 6 months of treatment.    5.  Hypertension pressures are adequately controlled on his current regimen.    6.  Disposition: At this point patient is realizing he is extremely debilitated after his prolonged illness.  Will discuss with case management patient has excepted going to rehab.  From my standpoint likely will be able to be discharged there tomorrow.    Diet: Regular Diet Adult  Fluids: none    DVT Prophylaxis: Xarelto  Code Status: Full Code    Disposition: Expected discharge in 1-2 days once safe disposition.    Jw Sinha    Interval History   Patient is alert appropriate no distress.  Complains of having very weak legs.  Also arm is very weak hemodynamically stable.  Remains on room air no fevers at all.  Blood pressures are very stable.  Changing over to Xarelto today.  Told him he does need to work with physical therapy.  But he has excepted the fact of going foot towards temporary rehab stay.    -Data reviewed today: I reviewed all new labs and imaging results over  the last 24 hours. I personally reviewed no images or EKG's today.    Physical Exam   Temp: 99  F (37.2  C) Temp src: Tympanic BP: 152/87 Pulse: 69   Resp: 20 SpO2: 94 % O2 Device: None (Room air) Oxygen Delivery: 1 LPM  Vitals:    02/20/20 0608 02/23/20 0400 02/24/20 0534   Weight: 95.5 kg (210 lb 8.6 oz) 87.9 kg (193 lb 12.6 oz) 89.2 kg (196 lb 10.4 oz)     Vital Signs with Ranges  Temp:  [97.9  F (36.6  C)-100  F (37.8  C)] 99  F (37.2  C)  Pulse:  [67-85] 69  Resp:  [20-22] 20  BP: (122-152)/() 152/87  SpO2:  [90 %-97 %] 94 %  I/O last 3 completed shifts:  In: 2115.8 [P.O.:600; I.V.:1515.8]  Out: 3200 [Urine:3200]    Peripheral IV 02/15/20 Right;Posterior Lower forearm (Active)   Site Assessment WDL 2/25/2020 12:00 AM   Line Status Saline locked 2/25/2020 12:00 AM   Phlebitis Scale 0-->no symptoms 2/25/2020 12:00 AM   Infiltration Scale 0 2/25/2020 12:00 AM   Infiltration Site Treatment Method  None 2/23/2020  8:45 PM   Extravasation? No 2/23/2020  8:45 PM   Number of days: 10       Peripheral IV 02/20/20 Left Upper arm (Active)   Site Assessment WDL 2/25/2020 12:00 AM   Line Status Saline locked 2/25/2020 12:00 AM   Phlebitis Scale 0-->no symptoms 2/25/2020 12:00 AM   Infiltration Scale 0 2/25/2020 12:00 AM   Infiltration Site Treatment Method  None 2/23/2020  8:45 PM   Extravasation? No 2/23/2020  6:14 PM   Number of days: 5       Wound 02/19/20 Posterior;Right Ischial tuberosity Other (comment) (Active)   Site Assessment UTV 2/25/2020 12:15 AM   Mandy-wound Assessment Ecchymosis 2/25/2020 12:15 AM   Drainage Amount None 2/25/2020 12:15 AM   Dressing Foam 2/25/2020 12:15 AM   Dressing Status Clean, dry, intact 2/25/2020 12:15 AM   Number of days: 6     No line/device    Constitutional: Alert and oriented x3. No distress    HEENT: Normocephalic/atraumatic, PERRL, EOMI, mouth moist, neck supple, no LN.     Cardiovascular: RRR. no Murmur, no  rubs, or gallops.  JVD no.  Bruits no.  Pulses  2+    Respiratory:scattered wheezeClear to auscultation bilaterally    Abdomen: Soft, nontender, nondistended, no organomegaly. Bowel sounds present    Extremities: Warm/dry. 1+edema  Both arms wit significant bruising    Neuro:   Non focal, cranial nerves intact, Moves all extremities.  Medications       amLODIPine  10 mg Oral Daily     cloNIDine   Transdermal Q8H     cloNIDine  1 patch Transdermal Weekly     ipratropium - albuterol 0.5 mg/2.5 mg/3 mL  3 mL Nebulization 4x daily     metoprolol tartrate  25 mg Oral BID     pantoprazole  40 mg Oral QAM AC     predniSONE  40 mg Oral Daily     rivaroxaban ANTICOAGULANT  15 mg Oral BID w/meals       Data   Recent Labs   Lab 02/25/20  0545 02/24/20  0440 02/23/20  0445 02/22/20  0426 02/21/20  0452 02/20/20  0450   WBC 13.6* 13.5* 15.3* 15.5* 13.7* 9.5   HGB 11.9* 12.9* 14.3 15.4 14.9 13.9   MCV 92 93 95 92 95 93    172 190 213 174 152   NA  --  141 142 141 140 143   POTASSIUM  --  4.2 4.3 4.4  4.3 4.1 3.8   CHLORIDE  --  111* 112* 111* 108 110*   CO2  --  26 25 26 27 29   BUN  --  32* 33* 33* 29 33*   CR  --  0.60* 0.63* 0.63* 0.62* 0.59*   ANIONGAP  --  4 5 4 5 4   CARLTON  --  7.8* 8.3* 8.4* 8.3* 8.2*   GLC  --  99 180* 180*  177* 158* 175*   ALBUMIN  --  2.7* 2.9* 3.0* 3.0* 2.7*   PROTTOTAL  --   --   --   --  6.5* 5.9*   BILITOTAL  --   --   --   --  0.6 0.6   ALKPHOS  --   --   --   --  48 45   ALT  --   --   --   --  45 30   AST  --   --   --   --  37 29       No results found for this or any previous visit (from the past 24 hour(s)).

## 2020-02-25 NOTE — PLAN OF CARE
Discharge Planner OT   Patient plan for discharge: Short term rehab at [facility]  Current status: Spent an extensive amount of time educating and encouraging pt to participate in self-cares. Worked with pt to set up environment as he preferred to improve his success with tasks. During session, pt demonstrated ability to transfer sit-stand from the chair with Anita. He ambulated into/out of the BR using FWW with Anita. Pt wheeled himself to the other sink in his room, but was able to walk back from the sink using FWW with Anita (sink near door to hallway). Pt transferred on/off the toilet with Anita. He changed his pull ups and completed leonel-cares after a BM IND. Pt also sat in the wheelchair to shave, wash his hands, and brush his teeth. Pt left in the chair. Pt appreciative of the encouragement OT provided. Pt on RA and SpO2 after the BR was 90-91% and after session ended was 92-93%.  Barriers to return to prior living situation: Weakness, deconditioning, multi-level home, spouse working, at risk for further functional decline  Recommendations for discharge: Short term rehab at [facility]  Rationale for recommendations: Pt was self-limiting at start of session. He needed much encouragement and education to increase his motivation. Pt demonstrates progress from yesterday, and then only required Anita at most for self-cares. He exhibits weakness and deconditioning from prolonged illness and will continue to benefit from ongoing skilled OT services to improve his independence in ADLs/IADLs. O2 sats slightly improved with activity, too.       Entered by: Shanique Pereira 02/25/2020 2:58 PM

## 2020-02-25 NOTE — PLAN OF CARE
"Discharge Planner PT   Patient plan for discharge: would like to go to SNF for rehab  Current status: Initially upon approaching patient he states \"I can't walk, I strained a muscle doing those exercises\".  Discussed that we would start with standing and see how things went.  Pt transferred sit>stand from chair min A.  Once in standing pt tolerated standing 30 seconds before asked to do some small marching in place, pt was able to complete marching in place x30 seconds with fairly good stability.  Had pt return to sitting to rest.  Pt's O2 sats 90% and HR upper 30s.  Did notify nursing about HR with no symptoms, okayed PT to continue.  Pt transferred stand>sit a second time with min A.  With encouragement pt was able to ambulate 8' with FWW min A, ambulates at slow pace with noted fatigue and weakness in LEs but no LOB.  Pt did demonstrate dyspnea, O2 sats 88%, HR 90s with activity.  Pt required 3 minutes and cues for deep breathing to recover to 90%.  Pt transferred sit>stand min A from w/c and ambulated additional 10' with FWW min A, again dyspnea and fatigue noted, O2 sats 89-90%, HR 90s.  O2 sats did increase to 90-91% withing 1 minute and HR decreased again into upper 30s.  Nursing again notified, pt asymptomatic.  Pt left up in chair.  Did educate pt on importance of increasing ambulation throughout day, discussed ambulating in afternoon and again in evening with nursing staff.  Barriers to return to prior living situation: limited activity tolerance, stairs  Recommendations for discharge: short term rehab vs home with outpatient PT   Rationale for recommendations: Pt needs encouragement and motivation to increase activity level while in hospital.  Pt has potential to improve to a point that he could return home but at his current level he will require short term rehab to return home safely.         Entered by: Manisha Boggs, PT 02/25/2020 3:43 PM       "

## 2020-02-25 NOTE — PLAN OF CARE
Assumed care of pt at 1330, pt sitting up in chair visiting w/wife, no needs at this time, NAD.     Face to face report given with opportunity to observe patient.    Report given to Jayme Carreon, RN   2/25/2020  3:25 PM

## 2020-02-25 NOTE — PLAN OF CARE
Face to face report given with opportunity to observe patient.    Report given to Ankita Allen RN   2/25/2020  6:31 AM

## 2020-02-25 NOTE — PLAN OF CARE
Discharge Planner SLP   Patient plan for discharge: subacute rehab  Current status: Regular diet, thin liquids, use of compensations, aspiration precautions.  Patient coordinating swallow with use of compensations (small singular bolus intake, effortful swallow, cyclic ingestion) and responding well to weighted effortful swallow to clear pharyngeal residue.  Patient reporting that he is noting a difference in his swallow and able to consume entire meal efficiently.  Pt will benefit from continued education and oropharyngeal conditioning tasks.  Pt reported that he previously coughed frequently when drinking and that tips are helping.  Barriers to return to prior living situation: none in regards to swallowing  Recommendations for discharge: home (in regards to swallowing only)  Rationale for recommendations: Pt gaining independence in swallowing techniques and increasing safety and efficiency of mealtime       Entered by: Shante Wayne 02/25/2020 8:30 AM

## 2020-02-25 NOTE — PROGRESS NOTES
Ry has been accepted at [facility] pending authorization from [facility]. CM remains available.

## 2020-02-25 NOTE — PLAN OF CARE
"Reason for hospital stay:  Influenza A    Most recent vitals: /91   Pulse 68   Temp 99.5  F (37.5  C) (Tympanic)   Resp 20   Ht 1.85 m (6' 0.84\")   Wt 89.2 kg (196 lb 10.4 oz)   SpO2 93%   BMI 26.06 kg/m    Pain Management:  Tylenol given for c/o leg pain with good relief, no further complaints voiced  Orientation:  A/O  Cardiac:  WDL  Respiratory:  Lung sounds clear et diminished, dyspnea with exertion  GI:  Bowel sounds audible et active x4  :  WDL  Diet: WDL  Skin Issues:  bruising to BUE  IVF:  SAline locked  ABX:  n/a  Mobility:  A1 with gait belt   Safety:  A/O calls for assist appropriately     Comments:    2/25/2020  12:49 PM  Ankita Wild RN  Face to face report given with opportunity to observe patient.    Report given to Haven Wild RN   2/25/2020  1:18 PM      "

## 2020-02-26 ENCOUNTER — APPOINTMENT (OUTPATIENT)
Dept: PHYSICAL THERAPY | Facility: HOSPITAL | Age: 55
DRG: 207 | End: 2020-02-26
Payer: COMMERCIAL

## 2020-02-26 ENCOUNTER — APPOINTMENT (OUTPATIENT)
Dept: OCCUPATIONAL THERAPY | Facility: HOSPITAL | Age: 55
DRG: 207 | End: 2020-02-26
Payer: COMMERCIAL

## 2020-02-26 ENCOUNTER — APPOINTMENT (OUTPATIENT)
Dept: SPEECH THERAPY | Facility: HOSPITAL | Age: 55
DRG: 207 | End: 2020-02-26
Payer: COMMERCIAL

## 2020-02-26 LAB
ERYTHROCYTE [DISTWIDTH] IN BLOOD BY AUTOMATED COUNT: 12.7 % (ref 10–15)
HCT VFR BLD AUTO: 34.1 % (ref 40–53)
HGB BLD-MCNC: 11.6 G/DL (ref 13.3–17.7)
MCH RBC QN AUTO: 31.2 PG (ref 26.5–33)
MCHC RBC AUTO-ENTMCNC: 34 G/DL (ref 31.5–36.5)
MCV RBC AUTO: 92 FL (ref 78–100)
PLATELET # BLD AUTO: 168 10E9/L (ref 150–450)
RBC # BLD AUTO: 3.72 10E12/L (ref 4.4–5.9)
WBC # BLD AUTO: 14.1 10E9/L (ref 4–11)

## 2020-02-26 PROCEDURE — 94640 AIRWAY INHALATION TREATMENT: CPT | Mod: 76

## 2020-02-26 PROCEDURE — 97530 THERAPEUTIC ACTIVITIES: CPT | Mod: GO

## 2020-02-26 PROCEDURE — 85027 COMPLETE CBC AUTOMATED: CPT | Performed by: INTERNAL MEDICINE

## 2020-02-26 PROCEDURE — 25000132 ZZH RX MED GY IP 250 OP 250 PS 637: Performed by: INTERNAL MEDICINE

## 2020-02-26 PROCEDURE — 25000125 ZZHC RX 250: Performed by: INTERNAL MEDICINE

## 2020-02-26 PROCEDURE — 25000131 ZZH RX MED GY IP 250 OP 636 PS 637: Performed by: INTERNAL MEDICINE

## 2020-02-26 PROCEDURE — 92526 ORAL FUNCTION THERAPY: CPT | Mod: GN

## 2020-02-26 PROCEDURE — 99232 SBSQ HOSP IP/OBS MODERATE 35: CPT | Performed by: INTERNAL MEDICINE

## 2020-02-26 PROCEDURE — 36415 COLL VENOUS BLD VENIPUNCTURE: CPT | Performed by: INTERNAL MEDICINE

## 2020-02-26 PROCEDURE — 40000275 ZZH STATISTIC RCP TIME EA 10 MIN

## 2020-02-26 PROCEDURE — 94640 AIRWAY INHALATION TREATMENT: CPT

## 2020-02-26 PROCEDURE — 12000000 ZZH R&B MED SURG/OB

## 2020-02-26 PROCEDURE — 97530 THERAPEUTIC ACTIVITIES: CPT | Mod: GP | Performed by: PHYSICAL THERAPIST

## 2020-02-26 RX ORDER — PREDNISONE 20 MG/1
20 TABLET ORAL DAILY
Status: DISCONTINUED | OUTPATIENT
Start: 2020-02-27 | End: 2020-02-27 | Stop reason: HOSPADM

## 2020-02-26 RX ORDER — ALBUTEROL SULFATE 90 UG/1
2 AEROSOL, METERED RESPIRATORY (INHALATION) EVERY 4 HOURS PRN
Status: DISCONTINUED | OUTPATIENT
Start: 2020-02-26 | End: 2020-02-27 | Stop reason: HOSPADM

## 2020-02-26 RX ADMIN — METOPROLOL TARTRATE 25 MG: 25 TABLET ORAL at 20:41

## 2020-02-26 RX ADMIN — PREDNISONE 40 MG: 20 TABLET ORAL at 09:27

## 2020-02-26 RX ADMIN — IPRATROPIUM BROMIDE AND ALBUTEROL SULFATE 3 ML: .5; 3 SOLUTION RESPIRATORY (INHALATION) at 11:43

## 2020-02-26 RX ADMIN — IPRATROPIUM BROMIDE AND ALBUTEROL SULFATE 3 ML: .5; 3 SOLUTION RESPIRATORY (INHALATION) at 15:21

## 2020-02-26 RX ADMIN — IPRATROPIUM BROMIDE AND ALBUTEROL SULFATE 3 ML: .5; 3 SOLUTION RESPIRATORY (INHALATION) at 07:47

## 2020-02-26 RX ADMIN — RIVAROXABAN 15 MG: 15 TABLET, FILM COATED ORAL at 08:01

## 2020-02-26 RX ADMIN — AMLODIPINE BESYLATE 10 MG: 5 TABLET ORAL at 09:27

## 2020-02-26 RX ADMIN — METOPROLOL TARTRATE 25 MG: 25 TABLET ORAL at 09:27

## 2020-02-26 RX ADMIN — PANTOPRAZOLE SODIUM 40 MG: 40 TABLET, DELAYED RELEASE ORAL at 08:01

## 2020-02-26 RX ADMIN — RIVAROXABAN 15 MG: 15 TABLET, FILM COATED ORAL at 16:32

## 2020-02-26 RX ADMIN — IPRATROPIUM BROMIDE AND ALBUTEROL SULFATE 3 ML: .5; 3 SOLUTION RESPIRATORY (INHALATION) at 20:01

## 2020-02-26 ASSESSMENT — ACTIVITIES OF DAILY LIVING (ADL)
ADLS_ACUITY_SCORE: 14
COGNITION: 0 - NO COGNITION ISSUES REPORTED
RETIRED_EATING: 0-->INDEPENDENT
RETIRED_COMMUNICATION: 0-->UNDERSTANDS/COMMUNICATES WITHOUT DIFFICULTY
ADLS_ACUITY_SCORE: 16
ADLS_ACUITY_SCORE: 14
BATHING: 0-->INDEPENDENT
TOILETING: 0-->INDEPENDENT
SWALLOWING: 0-->SWALLOWS FOODS/LIQUIDS WITHOUT DIFFICULTY
ADLS_ACUITY_SCORE: 15
ADLS_ACUITY_SCORE: 16
ADLS_ACUITY_SCORE: 13
FALL_HISTORY_WITHIN_LAST_SIX_MONTHS: NO
DRESS: 0-->INDEPENDENT

## 2020-02-26 NOTE — PROGRESS NOTES
[facility]    Hospitalist Progress Note      Assessment & Plan   Ry Man is a 54 year old male who was admitted on 2/14/2020.      1.  Acute respiratory failure with hypoxia and hypercapnia: Patient is actually doing very well he is on room air.  Shows a few scattered upper airway rhonchi but almost to baseline.  Is ambulating without difficulty.    2.  COPD exacerbation: This is due to the acute influenza A.  Doing well on bronchodilators.  Will taper down prednisone.  Occasional rhonchi but doing well.    3.  Influenza A: Treated with Tamiflu adequate course.    4.  Pulmonary embolism: Have transition to Xarelto.  Will be on 15 twice daily for 3 weeks then 20 mg daily.  For 6 months.    5.  Hypertension: Blood pressure is perfect at this time on his current regimen.    6.  Disposition: He is improving in his overall strength.  Was able to ambulate the halls today for the first time.  Still is rather debilitated.  Was laid up in bed for almost 9 days.  Has been accepted at [facility] nursing facility for rehab.  However insurance company is still evaluating for coverage.      Diet: Regular Diet Adult  Fluids: None    DVT Prophylaxis: Xarelto  Code Status: Full Code    Disposition: Expected discharge as soon as insurance company okays.    Jw Sinha    Interval History   Patient alert doing well feeling better eating better remains on room air.  Hemodynamically stable.  Did actually get up and walk today with physical therapy for the first time.  Did relatively well.  At this point feel that he is ready for discharge we have been awaiting okay by the insurance company to allow us to place him.  Is medically stable and ready to go    -Data reviewed today: I reviewed all new labs and imaging results over the last 24 hours. I personally reviewed no images or EKG's today.    Physical Exam   Temp: 99.5  F (37.5  C) Temp src: Tympanic BP: 127/70 Pulse: 78 Heart Rate: 91 Resp: 17 SpO2: 95 % O2 Device:  None (Room air)    Vitals:    02/20/20 0608 02/23/20 0400 02/24/20 0534   Weight: 95.5 kg (210 lb 8.6 oz) 87.9 kg (193 lb 12.6 oz) 89.2 kg (196 lb 10.4 oz)     Vital Signs with Ranges  Temp:  [98.8  F (37.1  C)-99.5  F (37.5  C)] 99.5  F (37.5  C)  Pulse:  [78] 78  Heart Rate:  [70-91] 91  Resp:  [17-20] 17  BP: (124-145)/(70-89) 127/70  SpO2:  [93 %-96 %] 95 %  I/O last 3 completed shifts:  In: 600 [P.O.:600]  Out: 2300 [Urine:2300]    Peripheral IV 02/15/20 Right;Posterior Lower forearm (Active)   Site Assessment [facility] 2/26/2020  8:00 AM   Line Status Saline locked 2/26/2020  8:00 AM   Phlebitis Scale 0-->no symptoms 2/26/2020  8:00 AM   Infiltration Scale 0 2/26/2020  8:00 AM   Infiltration Site Treatment Method  None 2/23/2020  8:45 PM   Extravasation? No 2/23/2020  8:45 PM   Number of days: 11       Peripheral IV 02/20/20 Left Upper arm (Active)   Site Assessment [facility] 2/26/2020  8:00 AM   Line Status Saline locked 2/26/2020  8:00 AM   Phlebitis Scale 0-->no symptoms 2/26/2020  8:00 AM   Infiltration Scale 0 2/26/2020  8:00 AM   Infiltration Site Treatment Method  None 2/23/2020  8:45 PM   Extravasation? No 2/23/2020  6:14 PM   Number of days: 6       Wound 02/19/20 Posterior;Right Ischial tuberosity Other (comment) (Active)   Site Assessment UTV 2/26/2020  7:52 AM   Mandy-wound Assessment UTV 2/26/2020  7:52 AM   Drainage Amount None 2/26/2020  7:52 AM   Dressing Foam 2/26/2020  7:52 AM   Dressing Status Clean, dry, intact 2/26/2020  7:52 AM   Number of days: 7     No line/device    Constitutional: Alert and oriented x3. No distress    HEENT: Normocephalic/atraumatic, PERRL, EOMI, mouth moist, neck supple, no LN.     Cardiovascular: RRR.  No murmur, no  rubs, or gallops.  JVD flat.  Bruits no.  Pulses 3+    Respiratory: Some scattered upper airway rhonchi otherwise clear to auscultation bilaterally    Abdomen: Soft, nontender, nondistended, no organomegaly. Bowel sounds present    Extremities: Warm/dry.  Trace  edema very ecchymotic upper arms    Neuro:   Non focal, cranial nerves intact, Moves all extremities.  Medications       amLODIPine  10 mg Oral Daily     cloNIDine   Transdermal Q8H     cloNIDine  1 patch Transdermal Weekly     ipratropium - albuterol 0.5 mg/2.5 mg/3 mL  3 mL Nebulization 4x daily     metoprolol tartrate  25 mg Oral BID     pantoprazole  40 mg Oral QAM AC     [START ON 2/27/2020] predniSONE  20 mg Oral Daily     rivaroxaban ANTICOAGULANT  15 mg Oral BID w/meals       Data   Recent Labs   Lab 02/26/20  0600 02/25/20  0545 02/24/20  0440 02/23/20  0445 02/22/20  0426 02/21/20  0452 02/20/20  0450   WBC 14.1* 13.6* 13.5* 15.3* 15.5* 13.7* 9.5   HGB 11.6* 11.9* 12.9* 14.3 15.4 14.9 13.9   MCV 92 92 93 95 92 95 93    159 172 190 213 174 152   NA  --   --  141 142 141 140 143   POTASSIUM  --   --  4.2 4.3 4.4  4.3 4.1 3.8   CHLORIDE  --   --  111* 112* 111* 108 110*   CO2  --   --  26 25 26 27 29   BUN  --   --  32* 33* 33* 29 33*   CR  --   --  0.60* 0.63* 0.63* 0.62* 0.59*   ANIONGAP  --   --  4 5 4 5 4   CARLTON  --   --  7.8* 8.3* 8.4* 8.3* 8.2*   GLC  --   --  99 180* 180*  177* 158* 175*   ALBUMIN  --   --  2.7* 2.9* 3.0* 3.0* 2.7*   PROTTOTAL  --   --   --   --   --  6.5* 5.9*   BILITOTAL  --   --   --   --   --  0.6 0.6   ALKPHOS  --   --   --   --   --  48 45   ALT  --   --   --   --   --  45 30   AST  --   --   --   --   --  37 29       No results found for this or any previous visit (from the past 24 hour(s)).

## 2020-02-26 NOTE — DISCHARGE INSTRUCTIONS
What to expect when you have contrast    During your exam, we will inject  contrast  into your vein or artery. (Contrast is a clear liquid with iodine in it. It shows up on X-rays.)    You may feel warm or hot. You may have a metal taste in your mouth and a slight upset stomach. You may also feel pressure near the kidneys and bladder. These effects will last about 1 to 3 minutes.    Please tell us if you have:    Sneezing     Itching    Hives     Swelling in the face    A hoarse voice    Breathing problems    Other new symptoms    Serious problems are rare.  They may include:    Irregular heartbeat     Seizures    Kidney failure              Tissue damage    Shock      Death    If you have any problems during the exam, we  will treat them right away.    When you get home    Call your hospital if you have any new symptoms in the next 2 days, like hives or swelling. (Phone numbers are at the bottom of this page.) Or call your family doctor.     If you have wheezing or trouble breathing, call 911.    Self-care  -Drink at least 4 extra glasses of water today.   This reduces the stress on your kidneys.  -Keep taking your regular medicines.    The contrast will pass out of your body in your  Urine(pee). This will happen in the next 24 hours. You  will not feel this. Your urine will not  change color.    If you have kidney problems or take metformin    Drink 4 to 8 large glasses of water for the next  2 days, if you are not on a fluid restriction.    ?If you take metformin (Glucophage or Glucovance) for diabetes, keep taking it.      ?Your kidney function tests are abnormal.  If you take Metformin, do not take it for 48 hours. Please go to your clinic for a blood test within 3 days after your exam before the restarting this medicine.     (Note to provider:please give patient prescription for lab tests.)    ?Special instructions: Drink an extra 4 glasses of water to flush out the contrast.     I have read and understand the  above information.    Patient Sign Here:______________________________________Date:________Time:______    Staff Sign Here:________________________________________Date:_______Time:______      Radiology Departments:     ?[facility]: [phone] ?[place]: [phone]     ?[place]: [phone] ?[facility] [place]:[phone]      ?[place]: [phone]  ?[place]: [phone]  ?[place]:[phone]    ?[facility]:[phone]  ?[facility]:[phone]      You have an establish care appointment with Dr Ana Pereira on Friday March 13 @ 8365.

## 2020-02-26 NOTE — PLAN OF CARE
Face to face report given with opportunity to observe patient.    Report given to DAVID Orourke   2/26/2020  6:53 AM

## 2020-02-26 NOTE — PLAN OF CARE
Pt A&O x4, VSS on RA. Denies pain. Up with assist of 1. HRR and lungs are clear but dim in the bases. Ivs SL. Good appetite. Urinal voiding QS. Pt slept throughout night with no complaints. No other significant events, will continue to monitor.

## 2020-02-26 NOTE — PLAN OF CARE
VSS, afebrile, HRR, lungs have wheezes in the right upper lobes, and coarse in the bases, bowels are active. Pt has bruising to bilateral inner arms. Pt stated that he was taking his own tylenol and took 650 mg doses @ 1200 and 1630. Pt is alert and oriented x 4, makes his needs known and calls approprietly.    Face to face report given with opportunity to observe patient.    Report given to DAVID Galindo RN   2/25/2020  8:15PM

## 2020-02-26 NOTE — PLAN OF CARE
VSS, denies pain. LS clear apically w/scattered exp wheezing & dim bases, infrequent congested cough. BS normoactive, tolerating PO diet, passing flatus. Ambulates Assist x1 w/FWW & GB, up in halls w/PT. Significant bruising to BUEs tender to touch, foam dressing to wound CDI, no shadowing, coccyx pink/blanchable. IV SL'd flushing w/o difficulty. Call light w/in reach, making needs known.     Face to face report given with opportunity to observe patient.    Report given to Mateo Carreon, RN   2/26/2020  7:04 PM

## 2020-02-26 NOTE — PROGRESS NOTES
Received update from [facility], they sent out the referral yesterday morning and still have not heard from them. A call was made to [facility] and [facility] has been on hold for over an hour. Set up an establish care appointment with Dr Pereira. CM remains available.

## 2020-02-26 NOTE — PLAN OF CARE
"Discharge Planner OT   Patient plan for discharge: Short term rehab, but home if insurance continues to take a long time/doesn't accept him  Current status: Pt donned his socks IND. Pt was then instructed in BUE AROM/light strengthening home program to improve functional status, including shoulder flexion, elbow flex/ext, forearm pro/sup, wrist flex/ext, finger flex/ext, and chair push ups x 8-10 reps each. Pt instructed in specific frequency, as well. Pt demonstrated good knowledge and understanding. Pt was then educated in home safety, particularily the bathroom. Pt very frustrated with his insurance, and having to wait here. Pt stated if he has to continue to \"wait and wait and wait\", he will just walk here and eventually end up walking right out the door. Pt's lunch then arrived.  Barriers to return to prior living situation: Steps, wife working- therefore, pt will be home alone, deconditioning due to prolonged illness   Recommendations for discharge: Short term rehab  Rationale for recommendations: Due to pt functioning below his baseline and making great gains during his rehab stay       Entered by: Shanique Pereira 02/26/2020 1:21 PM     "

## 2020-02-26 NOTE — PROGRESS NOTES
"CLINICAL NUTRITION SERVICES  -  ASSESSMENT NOTE    Ry Man : follow up    54 yom admitted for Influenza A. Pt has a hx of COPD. Pt was NPO for a few days while intubated. Jevity 1.2 was running at a low rate for 2 days while intubated. Pt has since been advancing his diets texture and consistency per SLP. Now advanced to regular diet with thin liquids. Intake is variable.    Diet Order: Regular  Intake: %    Height: 6' .835\"[per CareEverywhere-this is an estimation[  Weight: 196 lbs 10.41 oz  Body mass index is 26.06 kg/m .  Weight Status:  Overweight BMI 25-29.9  IBW: 79.5kg  Weight History: Weight trending down some. Question accuracy of bed scale.  02/20/20  02/15/20  02/15/20  09/12/18 95.5 kg (210 lb 8.6 oz)  92.5kg (203 lb 14.8 oz)- first measured weight  92.5 kg (203 lb 14.8 oz)  92 kg (202 lb 13.2 oz)   05/08/13 99.3 kg (219 lb)     Weight used to estimated nutrition needs - 92.5kg  Estimated Energy Needs: 4929-2105 kcals (25-30 Kcal/Kg)   Estimated Protein Needs:  grams protein (1-1.2 g pro/Kg)     Malnutrition Diagnosis: Patient does not meet two of the criteria necessary for diagnosing malnutrition    NUTRITION RECOMMENDATIONS  - Encourage intake  - Nutrition supplements may be indicated    MONITORING AND EVALUATION:  RD will monitor intake, weight, labs            "

## 2020-02-26 NOTE — PROGRESS NOTES
AUGUSTUS QUINTERO  Patient visit during  rounds. Patient impatient about getting out of the hospital...waiting for a referral but currently thinking if he improves that he will just go back home. The patent had no spiritual care requests.

## 2020-02-26 NOTE — PLAN OF CARE
Discharge Planner SLP   Patient plan for discharge: subacute rehab  Current status: Regular diet, thin liquids, use of compensations, aspiration precautions.  Patient is independent with swallowing compensations (small bite/sip, effortful swallow, liquid rinse) and following aspiration precautions. Patient is appropriate for speech discharge from this facility.  Barriers to return to prior living situation: none in regards to swallowing  Recommendations for discharge: home (in regards to swallowing only)  Rationale for recommendations: Patient gaining independence in swallowing techniques and increasing safety and efficiency of mealtime       Entered by: Angelica Benjamin 02/26/2020 1:05 PM

## 2020-02-26 NOTE — PLAN OF CARE
Discharge Planner PT   Patient plan for discharge: SNF for rehab pending insurance approval vs home with home vs OP PT pending progress  Current status: Patient was feeling very good today and states he is ready to work with therapy and see how far her can go today. Performed sit to stand from recliner with SBA to FWW. Ambulated 50' with SBA before a rest break. spO2 94 and HR mid 80's to 90's. Would rest 3-4 minutes and then get back to again, Did a total of 140' with 3 rest breaks maintaining appropriate vitals. Patient felt very good about his progress and is interested in trialing stairs pending he continues to feel good tomorrow..  Barriers to return to prior living situation: Weakness, concerns on if he will be home alone if he were to go home  Recommendations for discharge: SNF for rehab would be best placement, possible return home if he continues progressing in mobility and can do stairs  Rationale for recommendations: due to current functional status       Entered by: Vidhya Bae 02/26/2020 11:49 AM

## 2020-02-27 VITALS
OXYGEN SATURATION: 95 % | HEIGHT: 73 IN | DIASTOLIC BLOOD PRESSURE: 83 MMHG | HEART RATE: 80 BPM | BODY MASS INDEX: 26.06 KG/M2 | SYSTOLIC BLOOD PRESSURE: 125 MMHG | RESPIRATION RATE: 18 BRPM | TEMPERATURE: 98.6 F | WEIGHT: 196.65 LBS

## 2020-02-27 PROBLEM — I26.99 ACUTE PULMONARY EMBOLISM (H): Status: ACTIVE | Noted: 2020-02-27

## 2020-02-27 PROBLEM — J96.02 ACUTE RESPIRATORY FAILURE WITH HYPOXIA AND HYPERCAPNIA (H): Status: ACTIVE | Noted: 2020-02-27

## 2020-02-27 PROBLEM — J96.01 ACUTE RESPIRATORY FAILURE WITH HYPOXIA AND HYPERCAPNIA (H): Status: ACTIVE | Noted: 2020-02-27

## 2020-02-27 PROCEDURE — 40000275 ZZH STATISTIC RCP TIME EA 10 MIN

## 2020-02-27 PROCEDURE — 25000131 ZZH RX MED GY IP 250 OP 636 PS 637: Performed by: INTERNAL MEDICINE

## 2020-02-27 PROCEDURE — 25000132 ZZH RX MED GY IP 250 OP 250 PS 637: Performed by: INTERNAL MEDICINE

## 2020-02-27 PROCEDURE — 94640 AIRWAY INHALATION TREATMENT: CPT

## 2020-02-27 PROCEDURE — 25000125 ZZHC RX 250: Performed by: INTERNAL MEDICINE

## 2020-02-27 PROCEDURE — 94640 AIRWAY INHALATION TREATMENT: CPT | Mod: 76

## 2020-02-27 PROCEDURE — 99239 HOSP IP/OBS DSCHRG MGMT >30: CPT | Performed by: INTERNAL MEDICINE

## 2020-02-27 RX ORDER — METOPROLOL TARTRATE 25 MG/1
25 TABLET, FILM COATED ORAL 2 TIMES DAILY
DISCHARGE
Start: 2020-02-27 | End: 2023-04-11

## 2020-02-27 RX ORDER — PANTOPRAZOLE SODIUM 40 MG/1
40 TABLET, DELAYED RELEASE ORAL
DISCHARGE
Start: 2020-02-28 | End: 2022-04-28

## 2020-02-27 RX ORDER — PREDNISONE 20 MG/1
TABLET ORAL
DISCHARGE
Start: 2020-02-28 | End: 2020-03-13

## 2020-02-27 RX ORDER — IPRATROPIUM BROMIDE AND ALBUTEROL SULFATE 2.5; .5 MG/3ML; MG/3ML
3 SOLUTION RESPIRATORY (INHALATION) 4 TIMES DAILY
DISCHARGE
Start: 2020-02-27 | End: 2023-07-19

## 2020-02-27 RX ORDER — CLONIDINE 0.3 MG/24H
1 PATCH, EXTENDED RELEASE TRANSDERMAL WEEKLY
DISCHARGE
Start: 2020-02-28

## 2020-02-27 RX ORDER — NICOTINE 21 MG/24HR
1 PATCH, TRANSDERMAL 24 HOURS TRANSDERMAL DAILY PRN
DISCHARGE
Start: 2020-02-27 | End: 2022-04-28

## 2020-02-27 RX ORDER — ACETAMINOPHEN 325 MG/1
650 TABLET ORAL EVERY 4 HOURS PRN
DISCHARGE
Start: 2020-02-27 | End: 2023-07-19

## 2020-02-27 RX ADMIN — IPRATROPIUM BROMIDE AND ALBUTEROL SULFATE 3 ML: .5; 3 SOLUTION RESPIRATORY (INHALATION) at 11:07

## 2020-02-27 RX ADMIN — PANTOPRAZOLE SODIUM 40 MG: 40 TABLET, DELAYED RELEASE ORAL at 07:33

## 2020-02-27 RX ADMIN — IPRATROPIUM BROMIDE AND ALBUTEROL SULFATE 3 ML: .5; 3 SOLUTION RESPIRATORY (INHALATION) at 07:12

## 2020-02-27 RX ADMIN — AMLODIPINE BESYLATE 10 MG: 5 TABLET ORAL at 09:29

## 2020-02-27 RX ADMIN — PREDNISONE 20 MG: 20 TABLET ORAL at 09:29

## 2020-02-27 RX ADMIN — RIVAROXABAN 15 MG: 15 TABLET, FILM COATED ORAL at 07:33

## 2020-02-27 RX ADMIN — METOPROLOL TARTRATE 25 MG: 25 TABLET ORAL at 09:29

## 2020-02-27 ASSESSMENT — ACTIVITIES OF DAILY LIVING (ADL)
ADLS_ACUITY_SCORE: 12

## 2020-02-27 NOTE — PLAN OF CARE
Face to face report given with opportunity to observe patient.    Report given to DAVID Jaquez   2/27/2020  7:07 AM

## 2020-02-27 NOTE — DISCHARGE SUMMARY
[facility]  Hospitalist Discharge Summary       Date of Admission:  2/14/2020  Date of Discharge:  2/27/2020  Discharging Provider: Jw Sinha MD      Discharge Diagnoses   Acute respiratory failure with hypoxia and hypercapnia  COPD exacerbation  Influenza A  Pulmonary embolism acute  Hypertension  Hyperglycemia secondary to steroids    Follow-ups Needed After Discharge   There has been question of pulmonary nodule seen on a previous CT scan.  Not clear-cut during this hospital stay and evaluation.  Should at least be looked at once recovers from this current illness        Discharge Disposition   Discharged to nursing home  Condition at discharge: Stable    Hospital Course   Patient is a 54-year-old gentleman longstanding history of tobacco abuse with underlying COPD.  Has not really been taking any of his usual medications at all.  However for several days prior to his admission he started to have increasing shortness of breath.  Did have an inhaler at home that he felt maybe was too old or just was not really helping very much at all.  He presented initially to the ER and he was continued to smoke a pack per day also.  In the ER he had evaluation chest x-ray showed no acute infiltrates.  He was tested positive for influenza A.  Was given some steroids Tamiflu antibiotic and seemed to be doing okay was discharged home did not  any of his medications and came back to the ER with worsening shortness of breath.  Elevated respiratory rate requiring O2.  He was hypoxic.  He was sent to the CT scanner however became more unresponsive and then was brought back to the ER and was intubated and placed on mechanical ventilation at that time.  His blood gas had a pH 7.30 PCO2 46 PO2 80 bicarb 22.  He was moved up to the intensive care unit and placed on the ventilator the issues were dealt with as follows during his hospital stay.    1.  Acute respiratory failure with hypoxia hypercapnia: Patient was  very difficult to seemingly ventilate.  His oxygenation was fine he had permissive hypercapnia done.  And actually did seem to do very well he required significant sedation with propofol.  Aggressively treated with steroids antibiotics bronchodilators Tamiflu.  Eventually he was able to be weaned on 219 and extubated successfully.  Did require BiPAP for a day or so then on some interval and then eventually on 221 he was on nasal cannula.  Gradually this also was weaned off in the last couple of days he has been on room air.  Been able to ambulate without much significant dyspnea at all.    2.  COPD exacerbation: Patient longstanding history of tobacco abuse came in with acute influenza A likely the etiology of his exacerbation.  As above he was aggressively treated with Tamiflu bronchodilators steroids also given empiric antibiotics.  His procalcitonin was unremarkable never grown anything in terms of his blood cultures or sputum had no major fevers.  Felt the most this probably was due to the influenza A.  And currently time of discharge she is on a tapering dose of steroids he is will be on 20 for 3 days then 10 for 3 days and off he will have duo nebs 4 times a day and albuterol meter dose inhaler for as needed use.  During his hospital stay was treated with antibiotic therapy for 9 days his procalcitonin never was really much elevated as above no cultures were positive we felt that this was an adequate course of antibiotic coverage.    3.  Influenza A: Patient was treated with almost 9 days of Tamiflu more than adequate.    4.  Pulmonary embolism: His CT angiogram was performed it was somewhat technically low poor study but radiology felt that there was evidence of pulmonary embolism.  He was kept on twice daily dosing of full-strength Lovenox during his hospital stay.  And now that he has been more alert and appropriate and eating we did place him on Xarelto.  He will be on 15 mg twice daily for total of 3  weeks and then go on to 20 mg daily for a total of 6 months of treatment.  This can be re-visited by his primary care provider if so desired.    5.  Hypertension: Pressures were definitely higher side while here his medications were adjusted and currently he has been on amlodipine 10 mg daily, him Metroprolol tartrate 25 mg twice daily and he has been on a Catapres TTS 0.3 mg patch.    6.  Hyperglycemia: Did require some coverage with insulin during his hospital stay but he was on high-dose IV steroids.  Since that is been discontinued he is been doing fine.    7.  Disposition: Patient this time was basically bedbound for about 9 to 10 days extremely weak.  He is starting to ambulate currently but felt that he is not ready to be independent at home.  Despite some insurance delays he has not been accepted at structured nursing facility for ongoing therapy.  Anticipate this can be relatively short stay there.    8.  Tobacco abuse/dependence: Does have neck Derm patch in place.  He was counseled on the importance of discontinuing this.    Consultations This Hospital Stay   RESPIRATORY CARE IP CONSULT  WOUND OSTOMY CONTINENCE NURSE  IP CONSULT  PHARMACY IP CONSULT  NUTRITION SERVICES ADULT IP CONSULT  SWALLOW EVAL SPEECH PATH AT BEDSIDE IP CONSULT  PHYSICAL THERAPY ADULT IP CONSULT  OCCUPATIONAL THERAPY ADULT IP CONSULT  PHYSICAL THERAPY ADULT IP CONSULT  OCCUPATIONAL THERAPY ADULT IP CONSULT    Code Status   Full Code    Time Spent on this Encounter   I, Jw Sinha MD, personally saw the patient today and spent greater than 30 minutes discharging this patient.       Jw Sinha MD  [facility]  ______________________________________________________________________    Physical Exam   Vital Signs: Temp: 98.6  F (37  C) Temp src: Tympanic BP: 125/83 Pulse: 80 Heart Rate: 76 Resp: 18 SpO2: 92 % O2 Device: None (Room air)    Weight: 196 lbs 10.41 oz  Respiratory: No increased work of breathing, good  air exchange, clear to auscultation bilaterally, no crackles or wheezing  Cardiovascular: Normal apical impulse, regular rate and rhythm, normal S1 and S2, no S3 or S4, and no murmur noted  GI: No scars, normal bowel sounds, soft, non-distended, non-tender, no masses palpated, no hepatosplenomegally  Skin: Patient has significant ecchymosis over both of his upper arms.  Musculoskeletal: There is no redness, warmth, or swelling of the joints.  Full range of motion noted.  Motor strength is 5 out of 5 all extremities bilaterally.  Tone is normal.       Primary Care Physician   Physician No Ref-Primary    Discharge Orders      General info for SNF    Length of Stay Estimate: Short Term Care: Estimated # of Days <30  Condition at Discharge: Improving  Level of care:skilled   Rehabilitation Potential: Excellent  Admission H&P remains valid and up-to-date: Yes  Recent Chemotherapy: N/A  Use Nursing Home Standing Orders: Yes     Follow Up and recommended labs and tests    Follow up with Nursing home physician.  No follow up labs or test are needed.  Follow up with primary care provider in 2-3 weeks.  .     Activity - Up with nursing assistance     Reason for your hospital stay    Patient was admitted with respiratory failure requiring intubation mechanical ventilation.  He tested positive for influenza A.  Also a CT angiogram was performed which suggested pulmonary embolism.  He was treated with subcu Lovenox is now been transitioned to Xarelto.  He will be on Xarelto 15 mg twice daily for 3 weeks total followed by 20 mg daily for 6 months.  Remained intubated for several days.  Eventually extubated.  Had a full course of treatment with antibiotics with no clear-cut pneumonia also more than adequate course of Tamiflu.  Treat aggressively with bronchodilators and steroids also.  Last couple days has been on room air tolerating things very well.  He is on a prednisone taper.  Very weak and debilitated from his prolonged  bedrest.  Is starting to ambulate currently.  Felt that needs short-term stay at nursing home for continued strengthening.     Full Code     Physical Therapy Adult Consult    Evaluate and treat as clinically indicated.    Reason: Weakness     Occupational Therapy Adult Consult    Evaluate and treat as clinically indicated.    Reason: Weakness     Advance Diet as Tolerated    Follow this diet upon discharge: Orders Placed This Encounter      Regular Diet Adult       Significant Results and Procedures   Most Recent 3 CBC's:  Recent Labs   Lab Test 02/26/20  0600 02/25/20  0545 02/24/20  0440   WBC 14.1* 13.6* 13.5*   HGB 11.6* 11.9* 12.9*   MCV 92 92 93    159 172     Most Recent 3 BMP's:  Recent Labs   Lab Test 02/24/20  0440 02/23/20  0445 02/22/20  0426    142 141   POTASSIUM 4.2 4.3 4.4  4.3   CHLORIDE 111* 112* 111*   CO2 26 25 26   BUN 32* 33* 33*   CR 0.60* 0.63* 0.63*   ANIONGAP 4 5 4   CARLTON 7.8* 8.3* 8.4*   GLC 99 180* 180*  177*     Most Recent TSH and T4:No lab results found.  Most Recent Hemoglobin A1c:  Recent Labs   Lab Test 02/16/20  0437   A1C 6.4*     Most Recent ABG:  Recent Labs   Lab Test 02/19/20  1547   PH 7.51*   PO2 121*   PCO2 39   HCO3 31*   PRINCE 6.9   ,   Results for orders placed or performed during the hospital encounter of 02/14/20   CTA Chest with Contrast    Narrative    PROCEDURE: CTA CHEST WITH CONTRAST 2/14/2020 11:28 PM    HISTORY: PE suspected, high pretest prob    COMPARISONS: None.    Meds/Dose Given: [id number]- 50ml given    TECHNIQUE: CT angiogram of the chest with sagittal coronal MIP  reconstructions    FINDINGS: CT angiogram reveals suboptimal opacification of the  pulmonary arteries. There is a questionable pulmonary embolus seen in  the left main pulmonary artery on axial image series 4 image 76. There  are questionable bilateral pulmonary emboli in pulmonary artery  branches although these were not well visualized. The heart is normal  in size. The thoracic  aorta appears normal. There is some rounded  increased density adjacent to the cardiac apex in the lingula  follow-up of these opacities is recommended. The regional skeleton is  intact. The upper portion of the liver spleen and pancreas appear  normal. There is a large cyst in the left kidney.         Impression    IMPRESSION: A suboptimal study was achieved but there is a strong  suggestion of bilateral pulmonary emboli. The hospitalist was notified  of this finding at 6:54 AM.    MURTAZA ECHOLS MD   XR Chest Port 1 View    Narrative    PROCEDURE:  XR CHEST PORT 1 VW    HISTORY:  ETT placement.     COMPARISON:  February 14, 2020    FINDINGS:   The cardiac silhouette is normal in size. The pulmonary vasculature is  normal.  There is some left lungs scarring noted in the lingula. There  is an endotracheal tube its tip approximately 4 cm above the nahomy No  pleural effusion or pneumothorax.      Impression    IMPRESSION:  Endotracheal tube with its tip approximately 4 cm above  the nahomy.      MURTAZA ECHOLS MD   XR Chest Port 1 View    Narrative    PROCEDURE:  XR CHEST PORT 1 VW    HISTORY:  respiratory failure.     COMPARISON:  February 14, 2020    FINDINGS:   The cardiac silhouette is normal in size. The pulmonary vasculature is  normal.  There is some lingular atelectasis or scarring noted  unchanged. There is an endotracheal tube with its tip 6.5 cm above the  nahomy. No pleural effusion or pneumothorax.      Impression    IMPRESSION:  Lingular atelectasis or scarring without change      MURTAZA ECHOLS MD   XR Chest Port 1 View    Narrative    PROCEDURE:  XR CHEST PORT 1 VW    HISTORY:  gastric tube placement; intubated.     COMPARISON:  None.    FINDINGS:   The cardiac silhouette is normal in size. The pulmonary vasculature is  normal.  There is persistent area of increased density in the lingula.  A nasogastric tube is been passed into the stomach. No pleural  effusion or pneumothorax.      Impression     IMPRESSION:  Nasogastric tube with its tip in the stomach      MURTAZA ECHOLS MD   XR Chest Port 1 View    Narrative    PROCEDURE:  XR CHEST PORT 1 VW    HISTORY:  respiratory failure, intubated.     COMPARISON:  None.    FINDINGS:   The cardiac silhouette is normal in size. The pulmonary vasculature is  normal.  There is some increased density persist seen in the lingula  without change. There is an endotracheal tube is tip approximately 7  centimeters above the nahomy. There is a nasogastric tube passing in  the stomach. No pleural effusion or pneumothorax.      Impression    IMPRESSION:  No change from February 15, 2020      MURTAZA ECHOLS MD   XR Chest Port 1 View    Narrative    PROCEDURE:  XR CHEST PORT 1 VW    HISTORY:  intubated, respiratory failure.     COMPARISON:  None.    FINDINGS:   The cardiac silhouette is normal in size. The pulmonary vasculature is  normal.  The lingular opacity is again noted without change. There is  an endotracheal tube is tip proximal to 5 cm above the nahomy. There  is a nasogastric tube passing in the stomach. No pleural effusion or  pneumothorax.      Impression    IMPRESSION: Endotracheal tube with its tip approximate 5 cm above the  nahomy    MURTAZA ECHOLS MD   CT Chest w/o Contrast    Narrative    CT CHEST W/O CONTRAST    HISTORY:  Acute resp illness, > 40 years old; Dependence on respirator  [ventilator] status; ; persistent hypoventilation despite increase in  tidal volume; please evaluate for air trapping; worsening inflammatory  marker; evaluate for infiltrate      TECHNIQUE:  Non-contrast helical thoracic CT was performed.    COMPARISON:  CT PE chest 2/14/2020    FINDINGS:           Tracheostomy is in place. Cardiac size is normal. There is no  pericardial or pleural effusion. The thoracic aorta is normal in size  and course. No abnormal thoracic adenopathy is identified.    The central airways are patent. Atelectasis or consolidation is  present at the  right greater than left lung bases.    An enteric tube is present, with the proximal side hole just above the  GE junction.    Limited imaging of the upper abdomen demonstrates pericholecystic  fluid and a chronic left renal cyst.      Impression    IMPRESSION:    Right greater than left lower lobe atelectasis or consolidation.    Slightly proximally located enteric tube, with the proximal side hole  likely just above the GE junction.    Pericholecystic fluid. Consider follow-up ultrasound (NPO status  preferred).    ADINA FORMAN MD   XR Chest Port 1 View    Narrative    PROCEDURE:  XR CHEST PORT 1 VW    HISTORY:  intubated; agitation; check ETT placement.     COMPARISON:  None.    FINDINGS:   The cardiac silhouette is normal in size. The pulmonary vasculature is  normal.  Small area of increased density is again noted in the lingula  without change. There is an endotracheal tube with its tip  approximately 6 cm above the nahomy. This nasogastric tube passing in  the stomach. No pleural effusion or pneumothorax.      Impression    IMPRESSION:  No change from February 17, 2020      MURTAZA ECHOLS MD   XR Chest Port 1 View    Narrative    PROCEDURE:  XR CHEST PORT 1 VW    HISTORY:  intubated for respiratory failure.     COMPARISON:  None.    FINDINGS:   The cardiac silhouette is normal in size. The pulmonary vasculature is  normal.  There is elevation left hemidiaphragm with some increased  density at the left lung base which is worsened from previous  examination most likely the basis of atelectasis. There is an  endotracheal tube with its tip approximately 4 cm above the nahomy.  This nasogastric tube passing in the stomach. No pleural effusion or  pneumothorax.      Impression    IMPRESSION:  Elevation left hemidiaphragm with increasing volume loss  at the left lung base      MURTAZA ECHOLS MD   Echocardiogram Complete    Narrative    [id number]  [id number]  [id number]  [id number]^THOMAS           [place]  [facility]  Echocardiography Laboratory  [address] [address]     Name: AUGUSTUS QUINTERO  MRN: [id number]  : 1965  Study Date: 2020 05:36 AM  Age: 54 yrs  Gender: Male  Patient Location: [facility]  Reason For Study: CHF, Pulmonary Embolism  History: Pulmonary Embolus  Ordering Physician: LIDYA JIMENEZ  Referring Physician: LIDYA JIMENEZ  Performed By: Ynes Servin     BSA: 0.36 m2  Height: 6 in  Weight: 210 lb  _____________________________________________________________________________  __        Procedure  Technically difficult study. Pt intubated o ventiator. Done supine.  _____________________________________________________________________________  __        Interpretation Summary  Technically difficult study.  Pt intubated o ventiator. Done supine.  No pericardial effusion is present.  Global and regional left ventricular function is normal with an EF of 55-60%.  Grade I or early diastolic dysfunction.  The right ventricle is normal size.  Global right ventricular function is normal.  Mild left atrial enlargement is present.  Trace to mild mitral insufficiency is present.  Aortic valve is normal in structure and function.  Trace to mild tricuspid insufficiency is present.  The peak velocity of the tricuspid regurgitant jet is not obtainable.  The pulmonic valve cannot be assessed.  _____________________________________________________________________________  __        Left Ventricle  Global and regional left ventricular function is normal with an EF of 55-60%.  Grade I or early diastolic dysfunction.     Right Ventricle  The right ventricle is normal size. Global right ventricular function is  normal.     Atria  Mild left atrial enlargement is present.     Mitral Valve  Trace to mild mitral insufficiency is present.     Aortic Valve  Aortic valve is normal in structure and function. The mean AoV pressure  gradient is 2.2 mmHg.        Tricuspid Valve  Trace to mild  tricuspid insufficiency is present. The peak velocity of the  tricuspid regurgitant jet is not obtainable.     Pulmonic Valve  The pulmonic valve cannot be assessed.     Pericardium  No pericardial effusion is present.  _____________________________________________________________________________  __     MMode/2D Measurements & Calculations  IVSd: 0.94 cm  IVSs: 1.7 cm  LVIDd: 5.7 cm  LVIDs: 3.7 cm  LVPWd: 0.94 cm  LVPWs: 1.6 cm  FS: 34.3 %  LV mass(C)d: 206.6 grams  LV mass(C)dI: 575.4 grams/m2  LV mass(C)sI: 682.4 grams/m2  Ao root diam: 4.0 cm  LA dimension: 4.6 cm  LA/Ao: 1.1  LVOT diam: 2.4 cm  LVOT area: 4.4 cm2  RWT: 0.33           Doppler Measurements & Calculations  MV E max edmund: 75.2 cm/sec  MV A max edmund: 104.9 cm/sec  MV E/A: 0.72  MV dec slope: 288.8 cm/sec2  MV dec time: 0.26 sec  Ao V2 max: 100.5 cm/sec  Ao max P.0 mmHg  Ao V2 mean: 68.3 cm/sec  Ao mean P.2 mmHg  Ao V2 VTI: 25.7 cm  LORRAINE(I,D): 3.9 cm2  LORRAINE(V,D): 3.8 cm2  LV V1 max PG: 3.0 mmHg  LV V1 max: 86.5 cm/sec  LV V1 VTI: 23.0 cm  CO(LVOT): 15.8 l/min  CI(LVOT): 44.1 l/min/m2  SV(LVOT): 100.4 ml  SI(LVOT): 279.7 ml/m2  AV Edmund Ratio (DI): 0.86  LORRAINE Index (cm2/m2): 10.9  E/E': 8.6  Peak E' Edmund: 8.7 cm/sec     _____________________________________________________________________________  __           Report approved by: Anabel Castillo 2020 06:49 AM            Discharge Medications   Current Discharge Medication List      START taking these medications    Details   acetaminophen (TYLENOL) 325 MG tablet Take 2 tablets (650 mg) by mouth every 4 hours as needed for mild pain or fever    Associated Diagnoses: Chronic obstructive pulmonary disease with acute exacerbation (H)      cloNIDine (CATAPRES-TTS3) 0.3 MG/24HR WK patch Place 1 patch onto the skin once a week    Associated Diagnoses: Benign essential hypertension      ipratropium - albuterol 0.5 mg/2.5 mg/3 mL (DUONEB) 0.5-2.5 (3) MG/3ML neb solution Take 1 vial (3 mLs) by  nebulization 4 times daily    Associated Diagnoses: Chronic obstructive pulmonary disease with acute exacerbation (H)      metoprolol tartrate (LOPRESSOR) 25 MG tablet Take 1 tablet (25 mg) by mouth 2 times daily    Associated Diagnoses: Benign essential hypertension      nicotine (NICODERM CQ) 21 MG/24HR 24 hr patch Place 1 patch onto the skin daily as needed for smoking cessation    Associated Diagnoses: Chronic obstructive pulmonary disease with acute exacerbation (H)      pantoprazole (PROTONIX) 40 MG EC tablet Take 1 tablet (40 mg) by mouth every morning (before breakfast)    Associated Diagnoses: Chronic obstructive pulmonary disease with acute exacerbation (H)      rivaroxaban ANTICOAGULANT (XARELTO) 15 MG TABS tablet Take 1 tablet (15 mg) by mouth 2 times daily (with meals) for 20 days Then start 20 mg daily for 6 months    Associated Diagnoses: Acute pulmonary embolism without acute cor pulmonale, unspecified pulmonary embolism type (H)         CONTINUE these medications which have CHANGED    Details   predniSONE (DELTASONE) 20 MG tablet Take 1 tablet (20 mg) by mouth daily for 3 days, THEN 0.5 tablets (10 mg) daily for 3 days.    Associated Diagnoses: Chronic obstructive pulmonary disease with acute exacerbation (H)         CONTINUE these medications which have NOT CHANGED    Details   multivitamin w/minerals (MULTI-VITAMIN) tablet Take 1 tablet by mouth daily      Plant Sterols and Stanols (CHOLESTOFF) 450 MG TABS Take 900 mg by mouth daily      albuterol (PROAIR HFA/PROVENTIL HFA/VENTOLIN HFA) 108 (90 Base) MCG/ACT inhaler Inhale 1-2 puffs into the lungs every 4 hours as needed for shortness of breath / dyspnea or wheezing  Qty: 1 Inhaler, Refills: 0    Comments: Pharmacy may dispense brand covered by insurance (Proair, or proventil or ventolin or generic albuterol inhaler)      allopurinol (ZYLOPRIM) 300 MG tablet Take 300 mg by mouth daily       amLODIPine (NORVASC) 10 MG tablet Take 1 tablet (10 mg)  "by mouth daily  Qty: 30 tablet, Refills: 0      simvastatin (ZOCOR) 40 MG tablet Take 1 tablet by mouth At Bedtime.  Qty: 30 tablet, Refills: 6    Associated Diagnoses: Hyperlipidemia LDL goal <130         STOP taking these medications       aspirin 81 MG EC tablet Comments:   Reason for Stopping:         azithromycin (ZITHROMAX) 250 MG tablet Comments:   Reason for Stopping:         B Complex-C (SUPER B COMPLEX PO) Comments:   Reason for Stopping:         oseltamivir (TAMIFLU) 75 MG capsule Comments:   Reason for Stopping:         vitamin C (ASCORBIC ACID) 1000 MG TABS Comments:   Reason for Stopping:         Vitamin D, Cholecalciferol, 25 MCG (1000 UT) TABS Comments:   Reason for Stopping:         vitamin E (TOCOPHEROL) 400 units (360 mg) capsule Comments:   Reason for Stopping:             Allergies   Allergies   Allergen Reactions     Ethyl Alcohol, Skin      \"Ethyl Alcohol\"     Lisinopril Other (See Comments)     Cough       Valsartan Rash and GI Disturbance     Diovan     "

## 2020-02-27 NOTE — PLAN OF CARE
Occupational Therapy Discharge Summary    Reason for therapy discharge:    Discharged to transitional care facility.    Progress towards therapy goal(s). See goals on Care Plan in [facility] electronic health record for goal details.  Goals partially met.  Barriers to achieving goals:   discharge from facility.    Therapy recommendation(s):    Continued therapy is recommended.  Rationale/Recommendations:  To improve pt's strength and endurance for increased independence in ADLs/IADLs, for an eventual safe return home.

## 2020-02-27 NOTE — PLAN OF CARE
Physical Therapy Discharge Summary    Reason for therapy discharge:    Discharged to transitional care facility.    Progress towards therapy goal(s). See goals on Care Plan in [facility] electronic health record for goal details.  Goals not met.  Barriers to achieving goals:   discharge from facility.    Therapy recommendation(s):    Continued therapy is recommended.  Rationale/Recommendations:  to improve strength and functional mobility to return to independent level.

## 2020-02-27 NOTE — PLAN OF CARE
Pt A&O x4, VSS on RA. Denies pain. Up SBA with walker and gait belt. Pt ambulated 300 ft without difficulty. HRR and lungs are clear but dim in the bases. Appetite remains good. Pt slept throughout the night. No other significant events, will continue to monitor.

## 2020-02-27 NOTE — PLAN OF CARE
Patient discharged at 11:33 AM via wheel chair accompanied by other:friend and staff. Prescriptions sent to patients preferred pharmacy. All belongings sent with patient.     Discharge instructions reviewed with Ronny RN at [facility]. Listed belongings gathered and returned to patient.     Patient discharged to [facility].   Report called to Nursing Home:  DAVID Rivas    Core Measures and Vaccines  Core Measures applicable during stay: No. If yes, state diagnosis: n/a  Pneumonia and Influenza given prior to discharge, if indicated: Yes and N/A    Surgical Patient   Surgical Procedures during stay: no  Did patient receive discharge instruction on wound care and recognition of infection symptoms? Yes    MISC  Follow up appointment made:  Yes  Home and hospital aquired medications returned to patient: N/A  Patient reports pain was well managed at discharge: Yes

## 2020-03-05 DIAGNOSIS — J44.9 COPD (CHRONIC OBSTRUCTIVE PULMONARY DISEASE) (H): Primary | ICD-10-CM

## 2020-03-05 DIAGNOSIS — J44.1 CHRONIC OBSTRUCTIVE PULMONARY DISEASE WITH ACUTE EXACERBATION (H): ICD-10-CM

## 2020-03-05 NOTE — TELEPHONE ENCOUNTER
Received fax from [facility] in regards to Ry Man   :  1965     The following information was received via fax:    Insurance states discharge appropriate today. Please address ASAP    1. Ok to discharge home today with current meds?    2. Can we have an order for PT eval and treatment - dx COPD, ARF with hypoxia, PE    3. Please send prescription for tall front wheeled walker and home nebulizer to [facility].       Sun Allison LPN

## 2020-03-06 ENCOUNTER — TELEPHONE (OUTPATIENT)
Dept: FAMILY MEDICINE | Facility: OTHER | Age: 55
End: 2020-03-06

## 2020-03-06 NOTE — TELEPHONE ENCOUNTER
Melissa, from [facility],  Needs clarification on orders. She needs a second diagnosis on wheeled walker, patient needs to be seen face to face for the nebulizer and will need a copy of those signed notes. Stated she will need this information before they can dispense supplies.    Melissa's phone number is [phone]

## 2020-03-06 NOTE — TELEPHONE ENCOUNTER
VERONICA Zimmerman MD  You 2 hours ago (10:26 AM)      Looks like his next appointment is with Dr. Pereira she could do it at that appointment    Routing comment

## 2020-03-06 NOTE — TELEPHONE ENCOUNTER
Called patient- wife states they picked up the nebulizer and wheeled walked this morning and there is no issue. Was notified may need to be seen by primary care physician in order to be covered by insurance. Wife states no she got this morning. No further questions. Sun Allison LPN

## 2020-03-12 PROBLEM — E78.5 HYPERLIPIDEMIA: Status: ACTIVE | Noted: 2020-03-12

## 2020-03-12 NOTE — PROGRESS NOTES
"Subjective     Ry Man is a 54 year old male who presents to clinic today for the following health issues:    HPI   New Patient/Transfer of Care  Hyperlipidemia Follow-Up      Are you regularly taking any medication or supplement to lower your cholesterol?   { :404743}    Are you having muscle aches or other side effects that you think could be caused by your cholesterol lowering medication?  { :859311}    Hypertension Follow-up      Do you check your blood pressure regularly outside of the clinic? { :708636}     Are you following a low salt diet? { :038166}    Are your blood pressures ever more than 140 on the top number (systolic) OR more   than 90 on the bottom number (diastolic), for example 140/90? { :605510}    COPD Follow-Up    Overall, how are your COPD symptoms since your last clinic visit?  { :048543::\"No change\"}    How much fatigue or shortness of breath do you have when you are walking?  { :375509}    How much shortness of breath do you have when you are resting?  { :965298}    How often do you cough? { :630651}    Have you noticed any change in your sputum/phlegm?  { :083495}    Have you experienced a recent fever? { :504783}    Please describe how far you can walk without stopping to rest:  { :918219}    How many flights of stairs are you able to walk up without stopping?  { :250912}    Have you had any Emergency Room Visits, Urgent Care Visits, or Hospital Admissions because of your COPD since your last office visit?  { :293956}    History   Smoking Status     Current Every Day Smoker     Packs/day: 1.00     Years: 37.00     Types: Cigarettes   Smokeless Tobacco     Never Used     Comment: Tried to quit; longest tobacco free, 8 years     No results found for: FEV1, JJB7HFD      How many servings of fruits and vegetables do you eat daily?  { :719310}    On average, how many sweetened beverages do you drink each day (Examples: soda, juice, sweet tea, etc.  Do NOT count diet or artificially " "sweetened beverages)?   { 1-11:273275}    How many days per week do you exercise enough to make your heart beat faster? { :320560}    How many minutes a day do you exercise enough to make your heart beat faster? { :421287}    How many days per week do you miss taking your medication? {0-7 :815439}       {additonal problems for provider to add (Optional):263025}    {HIST REVIEW/ LINKS 2 (Optional):751724}    Reviewed and updated as needed this visit by Provider         Review of Systems   {ROS COMP (Optional):003471}      Objective    There were no vitals taken for this visit.  There is no height or weight on file to calculate BMI.  Physical Exam   {Exam List (Optional):648129}    {Diagnostic Test Results (Optional):538751::\"Diagnostic Test Results:\",\"Labs reviewed in Epic\"}        {PROVIDER CHARTING PREFERENCE:066847}    "

## 2020-03-13 ENCOUNTER — TRANSFERRED RECORDS (OUTPATIENT)
Dept: HEALTH INFORMATION MANAGEMENT | Facility: CLINIC | Age: 55
End: 2020-03-13

## 2020-03-13 ENCOUNTER — OFFICE VISIT (OUTPATIENT)
Dept: FAMILY MEDICINE | Facility: OTHER | Age: 55
End: 2020-03-13
Attending: FAMILY MEDICINE
Payer: COMMERCIAL

## 2020-03-13 VITALS
SYSTOLIC BLOOD PRESSURE: 110 MMHG | DIASTOLIC BLOOD PRESSURE: 62 MMHG | HEART RATE: 70 BPM | TEMPERATURE: 98 F | OXYGEN SATURATION: 92 %

## 2020-03-13 DIAGNOSIS — Z12.11 SPECIAL SCREENING FOR MALIGNANT NEOPLASMS, COLON: ICD-10-CM

## 2020-03-13 DIAGNOSIS — E79.0 ELEVATED URIC ACID IN BLOOD: ICD-10-CM

## 2020-03-13 DIAGNOSIS — E78.5 HYPERLIPIDEMIA, UNSPECIFIED HYPERLIPIDEMIA TYPE: ICD-10-CM

## 2020-03-13 DIAGNOSIS — Z11.4 ENCOUNTER FOR SCREENING FOR HIV: ICD-10-CM

## 2020-03-13 DIAGNOSIS — I26.99 ACUTE PULMONARY EMBOLISM, UNSPECIFIED PULMONARY EMBOLISM TYPE, UNSPECIFIED WHETHER ACUTE COR PULMONALE PRESENT (H): ICD-10-CM

## 2020-03-13 DIAGNOSIS — Z53.20 HIV SCREENING DECLINED: ICD-10-CM

## 2020-03-13 DIAGNOSIS — J96.00 ACUTE RESPIRATORY FAILURE, UNSPECIFIED WHETHER WITH HYPOXIA OR HYPERCAPNIA (H): Primary | ICD-10-CM

## 2020-03-13 DIAGNOSIS — J44.9 CHRONIC OBSTRUCTIVE PULMONARY DISEASE, UNSPECIFIED COPD TYPE (H): ICD-10-CM

## 2020-03-13 DIAGNOSIS — Z11.59 ENCOUNTER FOR HEPATITIS C SCREENING TEST FOR LOW RISK PATIENT: ICD-10-CM

## 2020-03-13 DIAGNOSIS — Z12.5 SCREENING FOR PROSTATE CANCER: ICD-10-CM

## 2020-03-13 LAB
ALT SERPL-CCNC: 77 U/L (ref 13–61)
AST SERPL-CCNC: 30 U/L (ref 15–37)
CHOLEST SERPL-MCNC: 143 MG/DL (ref 0–200)
CREAT SERPL-MCNC: 0.7 MG/DL (ref 0.7–1.2)
GFR SERPL CREATININE-BSD FRML MDRD: >60 ML/MIN/1.73M2
GLUCOSE SERPL-MCNC: 157 MG/DL (ref 74–106)
HBA1C MFR BLD: 6.4 % (ref 4–6)
HDLC SERPL-MCNC: 37 MG/DL
LDLC SERPL CALC-MCNC: 86 MG/DL
NONHDLC SERPL-MCNC: 106 MG/DL
POTASSIUM SERPL-SCNC: 4.1 MMOL/L (ref 3.5–5)
TRIGL SERPL-MCNC: 102 MG/DL (ref 0–150)

## 2020-03-13 PROCEDURE — 99203 OFFICE O/P NEW LOW 30 MIN: CPT | Performed by: FAMILY MEDICINE

## 2020-03-13 ASSESSMENT — PAIN SCALES - GENERAL: PAINLEVEL: MODERATE PAIN (5)

## 2020-03-13 NOTE — LETTER
My COPD Action Plan     Name: Ry Man    YOB: 1965   Date: 3/13/2020    My doctor: Ana Stark MD   My clinic: [facility] [place]    [facility] [address]  [phone]  My Controller Medicine: Albuterol (Proair/Ventolin/Proventolin)   Dose: 90mcg/act     My Rescue Medicine: Albuterol (Proair/Ventolin/Proventil) inhaler   Dose: 90mcg/act     My Flare Up Medicine: duo-neb   Dose: 0.5-2.5mg/3ml     My COPD Severity: Moderate = FeV1 < 79% -50%      Use of Oxygen: Oxygen Not Prescribed      Make sure you've had your pneumonia   vaccines.          GREEN ZONE       Doing well today      Usual level of activity and exercise    Usual amount of cough and mucus    No shortness of breath    Usual level of health (thinking clearly, sleeping well, feel like eating) Actions:      Take daily medicines    Use oxygen as prescribed    Follow regular exercise and diet plan    Avoid cigarette smoke and other irritants that harm the lungs           YELLOW ZONE          Having a bad day or flare up      Short of breath more than usual    A lot more sputum (mucus) than usual    Sputum looks yellow, green, tan, brown or bloody    More coughing or wheezing    Fever or chills    Less energy; trouble completing activities    Trouble thinking or focusing    Using quick relief inhaler or nebulizer more often    Poor sleep; symptoms wake me up    Do not feel like eating Actions:      Get plenty of rest    Take daily medicines    Use quick relief inhaler every 4 hours    If you use oxygen, call you doctor to see if you should adjust your oxygen    Do breathing exercises or other things to help you relax    Let a loved one, friend or neighbor know you are feeling worse    Call your care team if you have 2 or more symptoms.  Start taking steroids or antibiotics if directed by your care team           RED ZONE       Need medical care now      Severe shortness of breath (feel you can't  breathe)    Fever, chills    Not enough breath to do any activity    Trouble coughing up mucus, walking or talking    Blood in mucus    Frequent coughing   Rescue medicines are not working    Not able to sleep because of breathing    Feel confused or drowsy    Chest pain    Actions:      Call your health care team.  If you cannot reach your care team, call 911 or go to the emergency room.        Annual Reminders:  Meet with Care Team, Flu Shot every Fall  Pharmacy: [id number] - [place], [place] - [address]

## 2020-03-13 NOTE — PATIENT INSTRUCTIONS
Cologuard colon screen ordered.  Return for fasting labs - lipids, prostate, and uric acid levels.  [facility]  Smoking cessation with nicotine patches.  Outpatient physical therapy and occupational starts next week.  Continue current medications.  Follow up 1 month, sooner if concerns.

## 2020-03-13 NOTE — NURSING NOTE
"Chief Complaint   Patient presents with     Establish Care     Physical       Initial /62 (BP Location: Right arm, Patient Position: Sitting, Cuff Size: Adult Regular)   Pulse 70   Temp 98  F (36.7  C) (Tympanic)   SpO2 92%  Estimated body mass index is 26.06 kg/m  as calculated from the following:    Height as of 2/17/20: 1.85 m (6' 0.84\").    Weight as of 2/24/20: 89.2 kg (196 lb 10.4 oz).  Medication Reconciliation: complete  Marleni Christianson LPN  "

## 2020-03-13 NOTE — PROGRESS NOTES
3  SUBJECTIVE:   CC: Ry Man is an 54 year old male who presents for preventive health visit.     Healthy Habits:    Do you get at least three servings of calcium containing foods daily (dairy, green leafy vegetables, etc.)? yes    Amount of exercise or daily activities, outside of work: daily- regaining strength/walking with FWW.    Problems taking medications regularly No    Medication side effects: No    Have you had an eye exam in the past two years? yes    Do you see a dentist twice per year? no    Do you have sleep apnea, excessive snoring or daytime drowsiness?yes snoring    Hospital Follow-up Visit:    Hospital/Nursing Home/IP Rehab Facility: [facility]  Date of Admission: 2/14/20  Date of Discharge: 2/27/20  Reason(s) for Admission: acute respiratory failure, copd exacerbation, influenza A, acute PE; HTN; hyperglycemia secondary to steroids  Had been a slow decline over past year.  Prescribed Zpak frequently.  Progressive over past 1-2 months.    No prior PE/DVT.  Drives truck for work.      Had been off all medications leading to decline.    Treated with steroids, antibiotics, Tamiflu, Lovenox to Xarelto, nebs.    Discharged to rehab facility 2/28-3/6/20 at [facility].  Not covered to stay longer.  Will be starting outpatient PT next week.  Goal is to get to back to work.    Tobacco use -treated with patch - hasn't smoked since discharge; planning to do step down patches            Problems taking medications regularly:  None       Medication changes since discharge: None       Problems adhering to non-medication therapy:  None    Summary of hospitalization:  [facility] discharge summary reviewed  Diagnostic Tests/Treatments reviewed.  Follow up needed: lung nodule?  Other Healthcare Providers Involved in Patient s Care:         Physical Therapy  Update since discharge: improved.     Post Discharge Medication Reconciliation: discharge medications reconciled, continue medications  without change.  Plan of care communicated with patient and family     Coding guidelines for this visit:  Type of Medical   Decision Making Face-to-Face Visit       within 7 Days of discharge Face-to-Face Visit        within 14 days of discharge   Moderate Complexity 77970 [id number]   High Complexity 53422 [id number]               New Patient/Transfer of Care - Primary was Dr. Junior Garland    Hyperlipidemia Follow-Up    Are you regularly taking any medication or supplement to lower your cholesterol?   Yes- zocor    Are you having muscle aches or other side effects that you think could be caused by your cholesterol lowering medication?  No    Hypertension Follow-up    Do you check your blood pressure regularly outside of the clinic? No     Are you following a low salt diet? Yes    Are your blood pressures ever more than 140 on the top number (systolic) OR more   than 90 on the bottom number (diastolic), for example 140/90? Yes  Noted 2/14/20 systolic elevated  Multiple medications over time.  Current medication - catapress, lopressor, and clonidine - started in hospital and working well    COPD Follow-Up    Overall, how are your COPD symptoms since your last clinic visit?  Slightly worse, now improving    How much fatigue or shortness of breath do you have when you are walking?  Less than usual    How much shortness of breath do you have when you are resting?  None n/a    How often do you cough? All the time    Have you noticed any change in your sputum/phlegm?  No    Have you experienced a recent fever? No    Please describe how far you can walk without stopping to rest:  The length of 3-5 rooms(300ft) related to legs not breathing    How many flights of stairs are you able to walk up without stopping?  None related to leg problems-regaining strength    Have you had any Emergency Room Visits, Urgent Care Visits, or Hospital Admissions because of your COPD since your last office visit?  No-Hosp visit due to blood clots in  lungs    History   Smoking Status     Former Smoker     Packs/day: 1.00     Years: 37.00     Types: Cigarettes     Start date: 1980     Quit date: 2020   Smokeless Tobacco     Never Used     Comment: pt quite 18     No results found for: FEV1, JBT1PBO    How many servings of fruits and vegetables do you eat daily?  2-3    On average, how many sweetened beverages do you drink each day (Examples: soda, juice, sweet tea, etc.  Do NOT count diet or artificially sweetened beverages)?   1    How many days per week do you exercise enough to make your heart beat faster? 7    How many minutes a day do you exercise enough to make your heart beat faster? 9 or less    How many days per week do you miss taking your medication? 0       Gerd - on PPI - can feel it if misses or dose is over due; started in hospital    Allopurinol - uric acid was high.  Joint pains diffuse, but left knee mostly.  Has helped with joint pains.    Is overdue for colon screening.  Concerned about insurance coverage.    Today's PHQ-2 Score:   PHQ-2 (  [employer]) 2013   Q1: Little interest or pleasure in doing things 0   Q2: Feeling down, depressed or hopeless 0   PHQ-2 Score 0       Abuse: Current or Past(Physical, Sexual or Emotional)- No  Do you feel safe in your environment? Yes    Have you ever done Advance Care Planning? (For example, a Health Directive, POLST, or a discussion with a medical provider or your loved ones about your wishes): No, advance care planning information given to patient to review.  Advanced care planning was discussed at today's visit.- [facility]    Social History     Tobacco Use     Smoking status: Former Smoker     Packs/day: 1.00     Years: 37.00     Pack years: 37.00     Types: Cigarettes     Start date: 1980     Last attempt to quit: 2020     Years since quittin.1     Smokeless tobacco: Never Used     Tobacco comment: pt quite 18   Substance Use Topics     Alcohol  use: No     If you drink alcohol do you typically have >3 drinks per day or >7 drinks per week? No                      Last PSA: No results found for: PSA    Reviewed orders with patient. Reviewed health maintenance and updated orders accordingly - Yes  Current Outpatient Medications   Medication     acetaminophen (TYLENOL) 325 MG tablet     albuterol (PROAIR HFA/PROVENTIL HFA/VENTOLIN HFA) 108 (90 Base) MCG/ACT inhaler     allopurinol (ZYLOPRIM) 300 MG tablet     amLODIPine (NORVASC) 10 MG tablet     cloNIDine (CATAPRES-TTS3) 0.3 MG/24HR WK patch     ipratropium - albuterol 0.5 mg/2.5 mg/3 mL (DUONEB) 0.5-2.5 (3) MG/3ML neb solution     metoprolol tartrate (LOPRESSOR) 25 MG tablet     multivitamin w/minerals (MULTI-VITAMIN) tablet     nicotine (NICODERM CQ) 21 MG/24HR 24 hr patch     order for DME     order for DME     pantoprazole (PROTONIX) 40 MG EC tablet     Plant Sterols and Stanols (CHOLESTOFF) 450 MG TABS     rivaroxaban ANTICOAGULANT (XARELTO) 15 MG TABS tablet     simvastatin (ZOCOR) 40 MG tablet     UNABLE TO FIND     No current facility-administered medications for this visit.        Labs reviewed in EPIC    Reviewed and updated as needed this visit by clinical staff  Tobacco  Allergies  Meds         Reviewed and updated as needed this visit by Provider  Allergies  Meds        Past Medical History:   Diagnosis Date     Abnormal liver function test 1/1/2011     Bronchitis, not specified as acute or chronic 2/16/2005     Osteoarthritis 1/1/2011     Tobacco abuse 1/1/2011     Unspecified essential hypertension 9/1/2006      Past Surgical History:   Procedure Laterality Date     cysts removed from neck      Bilateral     vasectomy       wisdom teeth extraction         ROS:   ROS: 10 point ROS neg other than the symptoms noted above in the HPI.      OBJECTIVE:   /62 (BP Location: Right arm, Patient Position: Sitting, Cuff Size: Adult Regular)   Pulse 70   Temp 98  F (36.7  C) (Tympanic)   SpO2  92%   EXAM:  GENERAL: healthy, alert and no distress  EYES: Eyes grossly normal to inspection, PERRL and conjunctivae and sclerae normal  HENT: ear canals and TM's normal, nose and mouth without ulcers or lesions  NECK: no adenopathy, no asymmetry, masses, or scars and thyroid normal to palpation  RESP: lungs clear to auscultation - no rales, rhonchi or wheezes  CV: regular rate and rhythm, normal S1 S2, no S3 or S4, no murmur, click or rub, no peripheral edema and peripheral pulses strong  ABDOMEN: soft, nontender, no hepatosplenomegaly, no masses and bowel sounds normal  MS: no gross musculoskeletal defects noted, no edema  NEURO: Normal strength and tone, mentation intact and speech normal  PSYCH: mentation appears normal, affect normal/bright    Diagnostic Test Results:  Labs reviewed in Epic  Future labs ordered    ASSESSMENT/PLAN:       ICD-10-CM    1. Acute respiratory failure, unspecified whether with hypoxia or hypercapnia (H)  J96.00    2. Acute pulmonary embolism, unspecified pulmonary embolism type, unspecified whether acute cor pulmonale present (H)  I26.99    3. Screening for prostate cancer  Z12.5 PSA, screen   4. Special screening for malignant neoplasms, colon  Z12.11    5. Elevated uric acid in blood  E79.0 Uric acid   6. Chronic obstructive pulmonary disease, unspecified COPD type (H)  J44.9    7. Encounter for hepatitis C screening test for low risk patient  Z11.59 Hepatitis C Screen Reflex to HCV RNA Quant and Genotype   8. HIV screening declined  Z53.20    9. Encounter for screening for HIV  Z11.4 HIV Antigen Antibody Combo   10. Hyperlipidemia, unspecified hyperlipidemia type  E78.5 Lipid Profile (Chol, Trig, HDL, LDL calc)     Cologuard colon screen ordered.  Declines colonoscopy.  Return for fasting labs - lipids, prostate, and uric acid levels.  Other labs reviewed.  [facility]  Smoking cessation with nicotine patches.  Continue.  Outpatient physical therapy and  "occupational starts next week.  Continue current medications.  Follow up 1 month, sooner if concerns.    Over 40 min spent - over 50% in patient counseling.    COUNSELING:  Reviewed preventive health counseling, as reflected in patient instructions       Regular exercise       Healthy diet/nutrition       Vision screening       Hearing screening    Estimated body mass index is 26.06 kg/m  as calculated from the following:    Height as of 2/17/20: 1.85 m (6' 0.84\").    Weight as of 2/24/20: 89.2 kg (196 lb 10.4 oz).    Weight management plan: Discussed healthy diet and exercise guidelines     reports that he quit smoking about 5 weeks ago. His smoking use included cigarettes. He started smoking about 40 years ago. He has a 37.00 pack-year smoking history. He has never used smokeless tobacco.  Tobacco Cessation Action Plan: Pharmacotherapies : Nicotine patch    Counseling Resources:  ATP IV Guidelines  Pooled Cohorts Equation Calculator  FRAX Risk Assessment  ICSI Preventive Guidelines  Dietary Guidelines for Americans, 2010  USDA's MyPlate  ASA Prophylaxis  Lung CA Screening    Ana Stark MD  [facility] [place]  "

## 2020-03-16 ENCOUNTER — MEDICAL CORRESPONDENCE (OUTPATIENT)
Dept: HEALTH INFORMATION MANAGEMENT | Facility: CLINIC | Age: 55
End: 2020-03-16

## 2020-03-18 ENCOUNTER — TELEPHONE (OUTPATIENT)
Dept: FAMILY MEDICINE | Facility: OTHER | Age: 55
End: 2020-03-18

## 2020-03-18 NOTE — TELEPHONE ENCOUNTER
10:40 AM    Reason for Call: OVERBOOK    Patient is having the following symptoms: Pt was recently in hospital and needs to have paperwork filled out to continue s&a benfits while he is out of work.  months.    The patient is requesting an appointment for overbook/telephone visit with Dr. Stark.    Was an appointment offered for this call? No  If yes : Appointment type              Date    Preferred method for responding to this message: Telephone Call  What is your phone number [phone]    If we cannot reach you directly, may we leave a detailed response at the number you provided? Yes    Can this message wait until your PCP/provider returns, if unavailable today? PCP is in today    Sun Plata

## 2020-03-18 NOTE — TELEPHONE ENCOUNTER
Form received 03/19/2020 ,placed in provider's wall bin.   After form is completed patient would like form to be picked up..

## 2020-03-18 NOTE — TELEPHONE ENCOUNTER
Noted. Received paperwork this afternoon. Will fill out as soon as I can and notify pt. Marleni Christianson LPN

## 2020-03-20 ENCOUNTER — TELEPHONE (OUTPATIENT)
Dept: FAMILY MEDICINE | Facility: OTHER | Age: 55
End: 2020-03-20

## 2020-04-03 ENCOUNTER — TRANSFERRED RECORDS (OUTPATIENT)
Dept: HEALTH INFORMATION MANAGEMENT | Facility: CLINIC | Age: 55
End: 2020-04-03

## 2020-04-13 ENCOUNTER — TELEPHONE (OUTPATIENT)
Dept: FAMILY MEDICINE | Facility: OTHER | Age: 55
End: 2020-04-13

## 2020-04-13 NOTE — TELEPHONE ENCOUNTER
[employer] calling and will be faxing to  Family the incomplete order form that needs to be filled out for the Cologuard.    Please note.      Sammi Abdi RN

## 2020-04-20 ENCOUNTER — VIRTUAL VISIT (OUTPATIENT)
Dept: FAMILY MEDICINE | Facility: OTHER | Age: 55
End: 2020-04-20
Attending: FAMILY MEDICINE
Payer: COMMERCIAL

## 2020-04-20 VITALS
HEIGHT: 72 IN | WEIGHT: 178 LBS | SYSTOLIC BLOOD PRESSURE: 138 MMHG | DIASTOLIC BLOOD PRESSURE: 68 MMHG | BODY MASS INDEX: 24.11 KG/M2 | OXYGEN SATURATION: 95 % | HEART RATE: 60 BPM

## 2020-04-20 DIAGNOSIS — I10 BENIGN ESSENTIAL HYPERTENSION: ICD-10-CM

## 2020-04-20 DIAGNOSIS — J44.1 CHRONIC OBSTRUCTIVE PULMONARY DISEASE WITH ACUTE EXACERBATION (H): Primary | ICD-10-CM

## 2020-04-20 DIAGNOSIS — E78.5 HYPERLIPIDEMIA, UNSPECIFIED HYPERLIPIDEMIA TYPE: ICD-10-CM

## 2020-04-20 PROCEDURE — 99213 OFFICE O/P EST LOW 20 MIN: CPT | Mod: 95 | Performed by: FAMILY MEDICINE

## 2020-04-20 ASSESSMENT — PAIN SCALES - GENERAL: PAINLEVEL: MODERATE PAIN (5)

## 2020-04-20 ASSESSMENT — MIFFLIN-ST. JEOR: SCORE: 1675.4

## 2020-04-20 NOTE — NURSING NOTE
Chief Complaint   Patient presents with     COPD     Hypertension     Lipids       Initial /68   Pulse 60   Ht 1.829 m (6')   Wt 80.7 kg (178 lb)   SpO2 95%   BMI 24.14 kg/m   Estimated body mass index is 24.14 kg/m  as calculated from the following:    Height as of this encounter: 1.829 m (6').    Weight as of this encounter: 80.7 kg (178 lb).  Medication Reconciliation: complete  Marleni Christianson LPN

## 2020-04-20 NOTE — PROGRESS NOTES
"Ry Man is a 54 year old male who is being evaluated via a billable telephone visit.      The patient has been notified of following:     \"This telephone visit will be conducted via a call between you and your physician/provider. We have found that certain health care needs can be provided without the need for a physical exam.  This service lets us provide the care you need with a short phone conversation.  If a prescription is necessary we can send it directly to your pharmacy.  If lab work is needed we can place an order for that and you can then stop by our lab to have the test done at a later time.    Telephone visits are billed at different rates depending on your insurance coverage. During this emergency period, for some insurers they may be billed the same as an in-person visit.  Please reach out to your insurance provider with any questions.    If during the course of the call the physician/provider feels a telephone visit is not appropriate, you will not be charged for this service.\"    Patient has given verbal consent for Telephone visit?  Yes    How would you like to obtain your AVS? Mail a copy    Subjective     Ry Man is a 54 year old male who presents to clinic today for the following health issues:    Hyperlipidemia Follow-Up    Are you regularly taking any medication or supplement to lower your cholesterol?   Yes- lipitor    Are you having muscle aches or other side effects that you think could be caused by your cholesterol lowering medication?  Yes- left knee swells if not taken at the exact time everyday     Overdue for fasting lab - [facility]    Hypertension Follow-up    Do you check your blood pressure regularly outside of the clinic? Yes     Are you following a low salt diet? Yes    Are your blood pressures ever more than 140 on the top number (systolic) OR more   than 90 on the bottom number (diastolic), for example 140/90? Yes       COPD " Follow-Up    Overall, how are your COPD symptoms since your last clinic visit?  No change    How much fatigue or shortness of breath do you have when you are walking?  none    How much shortness of breath do you have when you are resting?  None    How often do you cough? Sometimes    Have you noticed any change in your sputum/phlegm?  Yes- improving since quiting smoking last febuary    Have you experienced a recent fever? Yes one time noted     Please describe how far you can walk without stopping to rest:  1-2 miles    How many flights of stairs are you able to walk up without stopping?  3 or more    Have you had any Emergency Room Visits, Urgent Care Visits, or Hospital Admissions because of your COPD since your last office visit?  Not since discharge    Seen for physical and hospital follow up 3/13/20    Stairs, walks a mile, own ADLs    Was doing PT/OT - once per week    Continues own daily home therapies.  Can do 1.4 miles daily at this point.  Stairs as well.    No falls or injuries    Pulse and O2 monitor; pulse was up to 80s with walking; oxygen lowest 90-91 with activity; 60 and 96% at rest    Consistent with neb use BID; AM phlegm    No fevers    ; no significant labor    Hasn't resumed smoking since hospitalization    Needs note to return to work.  Plans to return ASAP.  Will need a weeks worth of training.  Fax to Pamela Salvador, [phone].  Copy to patient as well .      History   Smoking Status     Former Smoker     Packs/day: 1.00     Years: 37.00     Types: Cigarettes     Start date: 1/1/1980     Quit date: 2/1/2020   Smokeless Tobacco     Never Used     Comment: pt quite 2/1/20     No results found for: FEV1, [id number]      How many servings of fruits and vegetables do you eat daily?  4 or more    On average, how many sweetened beverages do you drink each day (Examples: soda, juice, sweet tea, etc.  Do NOT count diet or artificially sweetened beverages)?   0    How many days  per week do you exercise enough to make your heart beat faster? 7    How many minutes a day do you exercise enough to make your heart beat faster? 30 - 60    How many days per week do you miss taking your medication? 0         Current Outpatient Medications   Medication     acetaminophen (TYLENOL) 325 MG tablet     albuterol (PROAIR HFA/PROVENTIL HFA/VENTOLIN HFA) 108 (90 Base) MCG/ACT inhaler     amLODIPine (NORVASC) 10 MG tablet     cloNIDine (CATAPRES-TTS3) 0.3 MG/24HR WK patch     ipratropium - albuterol 0.5 mg/2.5 mg/3 mL (DUONEB) 0.5-2.5 (3) MG/3ML neb solution     metoprolol tartrate (LOPRESSOR) 25 MG tablet     multivitamin w/minerals (MULTI-VITAMIN) tablet     nicotine (NICODERM CQ) 21 MG/24HR 24 hr patch     order for DME     order for DME     Plant Sterols and Stanols (CHOLESTOFF) 450 MG TABS     simvastatin (ZOCOR) 40 MG tablet     UNABLE TO FIND     allopurinol (ZYLOPRIM) 300 MG tablet     pantoprazole (PROTONIX) 40 MG EC tablet     rivaroxaban ANTICOAGULANT (XARELTO) 15 MG TABS tablet     No current facility-administered medications for this visit.        Patient Active Problem List   Diagnosis     Influenza A     Acute respiratory failure with hypoxia (H)     Benign essential hypertension     Respiratory failure (H)     Chronic obstructive pulmonary disease with acute exacerbation (H)     Acute respiratory failure with hypoxia and hypercapnia (H)     Acute pulmonary embolism (H)     Hyperlipidemia     Tobacco abuse     Hyperuricemia     ED (erectile dysfunction)     Past Surgical History:   Procedure Laterality Date     cysts removed from neck      Bilateral     vasectomy       wisdom teeth extraction         Social History     Tobacco Use     Smoking status: Former Smoker     Packs/day: 1.00     Years: 37.00     Pack years: 37.00     Types: Cigarettes     Start date: 1980     Last attempt to quit: 2020     Years since quittin.2     Smokeless tobacco: Never Used     Tobacco comment: pt  quite 2/1/20   Substance Use Topics     Alcohol use: No     Family History   Problem Relation Age of Onset     Heart Failure Father 72        CHF, cause of death     Diabetes Father      C.A.D. Mother      Diabetes Mother      C.A.D. Brother 51        Cause of death     Diabetes Brother      Hypertension Brother      Other - See Comments Brother         Multiple Sclerosis           Reviewed and updated as needed this visit by Provider         Review of Systems   ROS COMP: Constitutional, HEENT, cardiovascular, pulmonary, gi and gu systems are negative, except as otherwise noted.       Objective   Reported vitals:  /68   Pulse 60   Ht 1.829 m (6')   Wt 80.7 kg (178 lb)   SpO2 95%   BMI 24.14 kg/m       Diagnostic Test Results:  Labs reviewed in Epic        Assessment/Plan:  1. Chronic obstructive pulmonary disease with acute exacerbation (H)  Doing much better.  Able to do ADls.  Building stamina.    If worsening symptoms, will re-evaluate  See letter.    2. Benign essential hypertension  Stable.    3. Hyperlipidemia, unspecified hyperlipidemia type  Stable.      No follow-ups on file.    [facility]  Review labs.  Advise routine follow up.    Phone call duration:  9 minutes    Ana Stark MD

## 2020-04-20 NOTE — LETTER
[facility] [place]  [address]  [place] [address]  Phone: [phone]    April 20, 2020      Re:  Ry Man  [address] [place] [address]          To whom it may concern:    RE: Ry Man    Patient may return to work without restrictions 4/21/2020.    Please contact me for questions or concerns.      Sincerely,        Ana Stark MD

## 2020-04-20 NOTE — Clinical Note
Please put copy of today's letter for patient to  and fax one to [phone] attn Pamela Salvador.    Need to obtain [facility] - sign release if needed.

## 2020-05-04 ENCOUNTER — DOCUMENTATION ONLY (OUTPATIENT)
Dept: OTHER | Facility: CLINIC | Age: 55
End: 2020-05-04

## 2020-05-13 ENCOUNTER — TELEPHONE (OUTPATIENT)
Dept: FAMILY MEDICINE | Facility: OTHER | Age: 55
End: 2020-05-13

## 2020-05-13 NOTE — TELEPHONE ENCOUNTER
Form received forms for lay off ,placed in provider's wall bin.   After form is completed patient would like form to be pt would like pt to have forms faxed and he will  originals.

## 2020-05-18 ENCOUNTER — TELEPHONE (OUTPATIENT)
Dept: FAMILY MEDICINE | Facility: OTHER | Age: 55
End: 2020-05-18

## 2020-05-18 NOTE — TELEPHONE ENCOUNTER
Call placed to registration and form is at the  for patient to .  Call placed to patient and informed him that the form is done and at registration.    Patient is questioning if the form was faxed to the number that he had written on the front of the form?    Patient states that he will go to the [facility] now to pick it up.

## 2020-05-18 NOTE — TELEPHONE ENCOUNTER
Believe they were done last week. Please check.  If needing additional paperwork, will need to route to [place] this week for me to sign.

## 2020-05-18 NOTE — TELEPHONE ENCOUNTER
Patient would like providers nurse to call him regarding the Covid paperwork he dropped off to be signed and returned to his work.  He is not getting paid until they are signed.  [phone]

## 2020-05-22 NOTE — TELEPHONE ENCOUNTER
Can the forms be filled out by provider or covering provider and then patient fill out his part? Or does the Patient need to complete the forms first?   
Forms located. Per provider, needed more information from patient to fill out as there was no instruction or indication what forms were for. [facility] forms completion r/t being at high risk for covid.    
HC family nurse to call patient back regarding forms. Please leave detailed message ready for  per pt's request.   
I will be at [place] this afternoon. Otherwise [place] Tuesday.  Priyanka muhammad.  
Patient came in to the clinic stated that when he called whoever he talked to on the phone stated that the paperwork was here and it was in a big envelope. Checked back talked with nurses and nobody knew anything about it. Also double checked upstairs and there is no paperwork for patient. He needs to have this work release within the next day he needs to get paid from his employer.        [phone]          Pamela Bae  
Patient notified forms completed and faxed to [phone] ATTN Devin Devi. Copies made, one mailed to patient, and one sent to scanning.   
Patient returned call and states that the envelope that he picked up was old forms from 4/20/2020 that were a return to work form.  The forms that he is looking for is a full sized envelope that has about 4 pieces of paper in them for Dr. Pereira to sign that he had dropped off last week.   Neva, can you please look at the [place] and [place], can you please check the clinic for these forms.  Patient states he needs them to get paid at work.  
Patient talked to [place] samuel paperwork. He did have paperwork here but its not the right ones that he needs. He had given a big envelope for provider to fill out stating that he is at high risk and isnt able to go back to work. The only forms that were up front [employer] was his release to go back to work and he isnt able to he would like to go on the voluntary lay off through his employment. Would like a  Call back as soon as possible to know that you got the correct papers.Provider is in.            Pamela Bae       [phone]  
Please call patient to inquire what information is needed and complete this.  Then can route to johana. I will stop there this afternoon to sign things.  Otherwise will be at [place] on Tuesday.  If multiple unknowns/questinos - schedule virtual visit to be completed.  
Pt called again. He would like to receive a call mback as soon as possible to advise again if we have received the forms today, as he said Shannon was faxing here again.  
Received new forms today, need to be filled out again.hc  
Spoke to pt, filled out pt portion, just needs to be filled by you. Pt stated its for him to stay home due to COVID-19 and being high risk.   
Spoke with pt. Advised that [facility] and are looking through papers coming from [place] for them.   
They were at the [place].  They were blank.  I completed best I could.  I routed them back.  Please contact patient - if needing additional info.  
other

## 2020-09-28 DIAGNOSIS — J44.9 CHRONIC OBSTRUCTIVE PULMONARY DISEASE (COPD) (H): Primary | ICD-10-CM

## 2021-08-05 NOTE — PROGRESS NOTES
"Full note to follow  This is a 54-year-old man who presented twice to emergency department.  On first evaluation rapid screen showed influenza.  He apparently was otherwise compensated although remained tachypneic and was discharged from emergency department treated with oseltamivir and prednisone.  He returned with increased dyspnea and hypertension.  Arterial blood gas analysis showed combined respiratory metabolic acidosis (ABG 7.4/37 on first visit) he showed moderate leukocytosis.  Examination showed significant bronchospasm with diffuse wheezing.  Taken to CT for CT of the chest \"angiogram\" and became unresponsive on transport back to emergency department.  Emergently intubated in the emergency department.  Since then oxygenation has been adequate on relatively low FiO2.  He has had a minute ventilation in the range of 15- 17 L/min.  Ventilation is continued to be difficult although his degree of metabolic acidosis has been declining.  Lactate is never been elevated.  On my evaluation this morning examination showed poor aeration and diffuse wheezing.  Significant flow at end expiration.  Ineffective triggering minimally responsive to increase in PEEP to a maximum of 12 cm of water.  Mild hypotension responding to disconnection of ventilation circuit confirming air trapping.  On examination minimal withdrawal in flexor and extensor groups to tactile stimulation.  Pupils equal and round.  Miotic.  Conjugate.   Heart tones distinct S1 and S2 without significant tachycardia.  1/6 midsystolic murmur without radiation to carotids.  P2 questionably accentuated.  Respiratory exam shows moderate inspiratory efforts.  As noted ineffective triggering early in expiration.  Prolonged flow during expiration.  Continued inspiratory flow demands with peak flow 70 L/min improved with increased to 80 L/min.  Abdomen soft mildly obese.  Hypoactive bowel sounds.  Extremities warm.  Thigh wound on right lateral thigh examined.  " · Patient does not see an eye doctor but requires reading glasses  · Referral to ophthalmology for routine exam every 5 years or sooner as needed Nursing staff indicate a absorbent pad with marked purulent drainage was noted.  There is area of induration which appears to be chronic with postinflammatory discoloration.  0.5 x 1 cm wound extending to subcutaneous tissue without drainage and appears to be at least somewhat chronic.  Extremities are warm.  Easily palpable peripheral pulses    CT of the chest reviewed.  Discussed with radiology staff.  Technically limited study but does not eliminate the possibility of thromboembolic disease    Prior records from outpatient reviewed.  The patient does carry a history of COPD with at least several exacerbations during 2019.  Pulmonary function testing indicated on outpatient records although I do not find direct records of pulmonary function testing in the [facility] or our records.  FEV1/FVC 50%.  FEV1 33%.   DLCO 42% predicted.  % predicted with % predicted.  Reversibility was indicated.  The patient continues to smoke cigarettes with a 70-pack-year history.    1.  From the point of view of initial mechanical ventilatory management best to regard this is a patient with airway reversibility.  Severe limitation in ventilation with adequate oxygenation.  Likely COPD but best managed similar to severe asthma.  Permissive hypercapnia.  pH 7.18-7.22 likely adequate.  Allow hypercapnia.  Adequate oxygenation on  relatively low FiO2.  Currently 40%.  Spontaneous minute ventilation approximately 12-15 L/min currently.  If acidosis continues to be an issue may be reasonable to consider neuromuscular blockade although relatively low lactate suggests that respiratory muscle work, at least in terms of lactate turnover is not great and this may not add significantly to his management.  Continue relatively high-dose steroids.  Frequent bronchodilators.  He did receive continuous nebulization during the course of the night.  As this was while he was intubated and mechanically ventilated this may in fact have worsened  his airflow obstruction because of by his flow.  Frequent bronchodilators with albuterol MDI.  2.  Possible pulmonary embolism  Currently excessively high risk for repeat CT imaging.  Treat empirically for pulmonary embolism with enoxaparin every 12 hours.  Depending on his course repeat CT in another 24-36 hours if his clinical condition otherwise improves.  However may be necessary to commit him to at least 2-3 months of anticoagulation.  May be most reasonable to regard this as a provoked pulmonary embolism if in fact 1 is present.  3.  Nutritional support  Suspect he is going to require at least several days of mechanical ventilatory support.  Low threshold to initiating enteral feeds later today.     This patient remains critically ill at substantial risk of life-threatening deterioration. I have been present at the bedside on multiple occasions for serial evaluation as well as directing ongoing therapy including intensive mechanical ventilatory support. Critical care time so far today in evaluation and management exclusive of procedures 40 minutes.

## 2022-04-15 ENCOUNTER — APPOINTMENT (OUTPATIENT)
Dept: GENERAL RADIOLOGY | Facility: HOSPITAL | Age: 57
End: 2022-04-15
Attending: NURSE PRACTITIONER
Payer: COMMERCIAL

## 2022-04-15 ENCOUNTER — HOSPITAL ENCOUNTER (EMERGENCY)
Facility: HOSPITAL | Age: 57
Discharge: HOME | End: 2022-04-15
Attending: NURSE PRACTITIONER | Admitting: NURSE PRACTITIONER
Payer: COMMERCIAL

## 2022-04-15 VITALS
OXYGEN SATURATION: 90 % | RESPIRATION RATE: 28 BRPM | HEART RATE: 111 BPM | TEMPERATURE: 99.4 F | DIASTOLIC BLOOD PRESSURE: 135 MMHG | SYSTOLIC BLOOD PRESSURE: 168 MMHG

## 2022-04-15 DIAGNOSIS — J44.1 COPD EXACERBATION (H): ICD-10-CM

## 2022-04-15 LAB
ALBUMIN UR-MCNC: 70 MG/DL
ANION GAP SERPL CALCULATED.3IONS-SCNC: 5 MMOL/L (ref 3–14)
APPEARANCE UR: CLEAR
BASE EXCESS BLDV CALC-SCNC: 0.8 MMOL/L (ref -7.7–1.9)
BASOPHILS # BLD AUTO: 0 10E3/UL (ref 0–0.2)
BASOPHILS NFR BLD AUTO: 0 %
BILIRUB UR QL STRIP: NEGATIVE
BUN SERPL-MCNC: 23 MG/DL (ref 7–30)
CALCIUM SERPL-MCNC: 9.3 MG/DL (ref 8.5–10.1)
CHLORIDE BLD-SCNC: 104 MMOL/L (ref 94–109)
CO2 SERPL-SCNC: 27 MMOL/L (ref 20–32)
COLOR UR AUTO: YELLOW
CREAT SERPL-MCNC: 1.01 MG/DL (ref 0.66–1.25)
EOSINOPHIL # BLD AUTO: 0.2 10E3/UL (ref 0–0.7)
EOSINOPHIL NFR BLD AUTO: 2 %
ERYTHROCYTE [DISTWIDTH] IN BLOOD BY AUTOMATED COUNT: 13 % (ref 10–15)
GFR SERPL CREATININE-BSD FRML MDRD: 86 ML/MIN/1.73M2
GLUCOSE BLD-MCNC: 160 MG/DL (ref 70–99)
GLUCOSE UR STRIP-MCNC: NEGATIVE MG/DL
HCO3 BLDV-SCNC: 28 MMOL/L (ref 21–28)
HCT VFR BLD AUTO: 51.2 % (ref 40–53)
HGB BLD-MCNC: 16.8 G/DL (ref 13.3–17.7)
HGB UR QL STRIP: NEGATIVE
HOLD SPECIMEN: NORMAL
HYALINE CASTS: 5 /LPF
IMM GRANULOCYTES # BLD: 0 10E3/UL
IMM GRANULOCYTES NFR BLD: 1 %
KETONES UR STRIP-MCNC: 20 MG/DL
LACTATE SERPL-SCNC: 1.6 MMOL/L (ref 0.7–2)
LEUKOCYTE ESTERASE UR QL STRIP: NEGATIVE
LYMPHOCYTES # BLD AUTO: 1 10E3/UL (ref 0.8–5.3)
LYMPHOCYTES NFR BLD AUTO: 12 %
MCH RBC QN AUTO: 30.9 PG (ref 26.5–33)
MCHC RBC AUTO-ENTMCNC: 32.8 G/DL (ref 31.5–36.5)
MCV RBC AUTO: 94 FL (ref 78–100)
MONOCYTES # BLD AUTO: 0.7 10E3/UL (ref 0–1.3)
MONOCYTES NFR BLD AUTO: 8 %
MUCOUS THREADS #/AREA URNS LPF: PRESENT /LPF
NEUTROPHILS # BLD AUTO: 6.3 10E3/UL (ref 1.6–8.3)
NEUTROPHILS NFR BLD AUTO: 77 %
NITRATE UR QL: NEGATIVE
NRBC # BLD AUTO: 0 10E3/UL
NRBC BLD AUTO-RTO: 0 /100
NT-PROBNP SERPL-MCNC: 91 PG/ML (ref 0–900)
O2/TOTAL GAS SETTING VFR VENT: 21 %
OXYHGB MFR BLDV: 64 % (ref 70–75)
PCO2 BLDV: 52 MM HG (ref 40–50)
PH BLDV: 7.34 [PH] (ref 7.32–7.43)
PH UR STRIP: 6.5 [PH] (ref 4.7–8)
PLATELET # BLD AUTO: 192 10E3/UL (ref 150–450)
PO2 BLDV: 36 MM HG (ref 25–47)
POTASSIUM BLD-SCNC: 4.2 MMOL/L (ref 3.4–5.3)
RBC # BLD AUTO: 5.43 10E6/UL (ref 4.4–5.9)
RBC URINE: 0 /HPF
SODIUM SERPL-SCNC: 136 MMOL/L (ref 133–144)
SP GR UR STRIP: 1.02 (ref 1–1.03)
SQUAMOUS EPITHELIAL: 0 /HPF
TROPONIN I SERPL HS-MCNC: 26 NG/L
UROBILINOGEN UR STRIP-MCNC: NORMAL MG/DL
WBC # BLD AUTO: 8.2 10E3/UL (ref 4–11)
WBC URINE: 1 /HPF

## 2022-04-15 PROCEDURE — 36415 COLL VENOUS BLD VENIPUNCTURE: CPT | Performed by: NURSE PRACTITIONER

## 2022-04-15 PROCEDURE — 84484 ASSAY OF TROPONIN QUANT: CPT | Performed by: NURSE PRACTITIONER

## 2022-04-15 PROCEDURE — 96365 THER/PROPH/DIAG IV INF INIT: CPT

## 2022-04-15 PROCEDURE — 250N000009 HC RX 250: Performed by: NURSE PRACTITIONER

## 2022-04-15 PROCEDURE — 999N000157 HC STATISTIC RCP TIME EA 10 MIN

## 2022-04-15 PROCEDURE — 71045 X-RAY EXAM CHEST 1 VIEW: CPT

## 2022-04-15 PROCEDURE — 83605 ASSAY OF LACTIC ACID: CPT | Performed by: NURSE PRACTITIONER

## 2022-04-15 PROCEDURE — 82805 BLOOD GASES W/O2 SATURATION: CPT | Performed by: NURSE PRACTITIONER

## 2022-04-15 PROCEDURE — 99285 EMERGENCY DEPT VISIT HI MDM: CPT | Mod: 25

## 2022-04-15 PROCEDURE — 99285 EMERGENCY DEPT VISIT HI MDM: CPT | Performed by: NURSE PRACTITIONER

## 2022-04-15 PROCEDURE — 96375 TX/PRO/DX INJ NEW DRUG ADDON: CPT

## 2022-04-15 PROCEDURE — 85025 COMPLETE CBC W/AUTO DIFF WBC: CPT | Performed by: NURSE PRACTITIONER

## 2022-04-15 PROCEDURE — 93005 ELECTROCARDIOGRAM TRACING: CPT

## 2022-04-15 PROCEDURE — 250N000013 HC RX MED GY IP 250 OP 250 PS 637: Performed by: NURSE PRACTITIONER

## 2022-04-15 PROCEDURE — 87637 SARSCOV2&INF A&B&RSV AMP PRB: CPT | Performed by: NURSE PRACTITIONER

## 2022-04-15 PROCEDURE — 93010 ELECTROCARDIOGRAM REPORT: CPT | Performed by: INTERNAL MEDICINE

## 2022-04-15 PROCEDURE — 83880 ASSAY OF NATRIURETIC PEPTIDE: CPT | Performed by: NURSE PRACTITIONER

## 2022-04-15 PROCEDURE — 80048 BASIC METABOLIC PNL TOTAL CA: CPT | Performed by: NURSE PRACTITIONER

## 2022-04-15 PROCEDURE — 250N000011 HC RX IP 250 OP 636: Performed by: NURSE PRACTITIONER

## 2022-04-15 PROCEDURE — 81003 URINALYSIS AUTO W/O SCOPE: CPT | Performed by: NURSE PRACTITIONER

## 2022-04-15 PROCEDURE — 94640 AIRWAY INHALATION TREATMENT: CPT

## 2022-04-15 PROCEDURE — C9803 HOPD COVID-19 SPEC COLLECT: HCPCS

## 2022-04-15 RX ORDER — LEVOFLOXACIN 500 MG/1
500 TABLET, FILM COATED ORAL DAILY
Qty: 5 TABLET | Refills: 0 | Status: SHIPPED | OUTPATIENT
Start: 2022-04-15 | End: 2022-04-28

## 2022-04-15 RX ORDER — IPRATROPIUM BROMIDE AND ALBUTEROL SULFATE 2.5; .5 MG/3ML; MG/3ML
3 SOLUTION RESPIRATORY (INHALATION) ONCE
Status: COMPLETED | OUTPATIENT
Start: 2022-04-15 | End: 2022-04-15

## 2022-04-15 RX ORDER — ALBUTEROL SULFATE 0.83 MG/ML
2.5 SOLUTION RESPIRATORY (INHALATION) ONCE
Status: COMPLETED | OUTPATIENT
Start: 2022-04-15 | End: 2022-04-15

## 2022-04-15 RX ORDER — PREDNISONE 20 MG/1
TABLET ORAL
Qty: 10 TABLET | Refills: 0 | Status: SHIPPED | OUTPATIENT
Start: 2022-04-15 | End: 2022-04-15

## 2022-04-15 RX ORDER — AZITHROMYCIN 500 MG/1
TABLET, FILM COATED ORAL
Qty: 7 TABLET | Refills: 0 | Status: SHIPPED | OUTPATIENT
Start: 2022-04-15 | End: 2022-04-15 | Stop reason: SINTOL

## 2022-04-15 RX ORDER — METHYLPREDNISOLONE SODIUM SUCCINATE 125 MG/2ML
125 INJECTION, POWDER, LYOPHILIZED, FOR SOLUTION INTRAMUSCULAR; INTRAVENOUS ONCE
Status: COMPLETED | OUTPATIENT
Start: 2022-04-15 | End: 2022-04-15

## 2022-04-15 RX ORDER — ALBUTEROL SULFATE 0.83 MG/ML
SOLUTION RESPIRATORY (INHALATION)
Qty: 25 ML | Refills: 0 | Status: SHIPPED | OUTPATIENT
Start: 2022-04-15 | End: 2024-06-13

## 2022-04-15 RX ORDER — LEVOFLOXACIN 500 MG/1
500 TABLET, FILM COATED ORAL DAILY
Qty: 5 TABLET | Refills: 0 | Status: SHIPPED | OUTPATIENT
Start: 2022-04-15 | End: 2022-04-15

## 2022-04-15 RX ORDER — CLONIDINE HYDROCHLORIDE 0.1 MG/1
0.1 TABLET ORAL ONCE
Status: COMPLETED | OUTPATIENT
Start: 2022-04-15 | End: 2022-04-15

## 2022-04-15 RX ORDER — MAGNESIUM SULFATE HEPTAHYDRATE 40 MG/ML
2 INJECTION, SOLUTION INTRAVENOUS ONCE
Status: COMPLETED | OUTPATIENT
Start: 2022-04-15 | End: 2022-04-15

## 2022-04-15 RX ORDER — PREDNISONE 20 MG/1
TABLET ORAL
Qty: 10 TABLET | Refills: 0 | Status: SHIPPED | OUTPATIENT
Start: 2022-04-15 | End: 2022-04-28

## 2022-04-15 RX ADMIN — ALBUTEROL SULFATE 2.5 MG: 2.5 SOLUTION RESPIRATORY (INHALATION) at 13:37

## 2022-04-15 RX ADMIN — METHYLPREDNISOLONE SODIUM SUCCINATE 125 MG: 125 INJECTION, POWDER, FOR SOLUTION INTRAMUSCULAR; INTRAVENOUS at 12:43

## 2022-04-15 RX ADMIN — ALBUTEROL SULFATE 2.5 MG: 2.5 SOLUTION RESPIRATORY (INHALATION) at 12:36

## 2022-04-15 RX ADMIN — CLONIDINE HYDROCHLORIDE 0.1 MG: 0.1 TABLET ORAL at 15:00

## 2022-04-15 RX ADMIN — MAGNESIUM SULFATE IN WATER 2 G: 40 INJECTION, SOLUTION INTRAVENOUS at 13:33

## 2022-04-15 RX ADMIN — IPRATROPIUM BROMIDE AND ALBUTEROL SULFATE 3 ML: .5; 3 SOLUTION RESPIRATORY (INHALATION) at 14:42

## 2022-04-15 ASSESSMENT — ENCOUNTER SYMPTOMS
MUSCULOSKELETAL NEGATIVE: 1
WHEEZING: 1
EYES NEGATIVE: 1
HEMATOLOGIC/LYMPHATIC NEGATIVE: 1
CONSTITUTIONAL NEGATIVE: 1
GASTROINTESTINAL NEGATIVE: 1
ALLERGIC/IMMUNOLOGIC NEGATIVE: 1
ENDOCRINE NEGATIVE: 1
SHORTNESS OF BREATH: 1
PSYCHIATRIC NEGATIVE: 1
CARDIOVASCULAR NEGATIVE: 1
COUGH: 1
NEUROLOGICAL NEGATIVE: 1

## 2022-04-15 NOTE — ED NOTES
Pt receiving additional nebulizer due to increased wheezing. Magnesium Sulfate 2mg IV given at this time.

## 2022-04-15 NOTE — DISCHARGE INSTRUCTIONS
Thank you for choosing [facility] for your healthcare needs today.  For your COPD exacerbation, please continue with your neb treatments and inhalers as directed.  Take your prednisone 40 mg daily, your Zithromax 500 mg daily, increase your water intake, and follow-up with your primary care provider next week for reevaluation.  If your symptoms worsen or you develop any new concerning symptoms please return to ER.  Thank you

## 2022-04-15 NOTE — ED PROVIDER NOTES
"  History     Chief Complaint   Patient presents with     Shortness of Breath     HPI   History of presenting illness given by patient.    Ry Man is a 56 year old male  with a significant medical history of COPD and hypertension.  He presents to the emergency room today for evaluation of worsening shortness of breath, increased wheezing, and pulse rate that suddenly elevates into the 120s.  He feels he cannot cough up his phlegm that normally comes out with coughing in the morning.  He has used his breathing treatments without relief of symptoms.  His symptoms started 3 days ago and have progressively worsened.  Patient denies fevers, chest pain, pain that radiates to his left arm, neck, back, or jaw.      Allergies:  Allergies   Allergen Reactions     Ethyl Alcohol, Skin      \"Ethyl Alcohol\"     Lisinopril Other (See Comments)     Cough       Valsartan Rash and GI Disturbance     Diovan       Problem List:    Patient Active Problem List    Diagnosis Date Noted     Hyperlipidemia 03/12/2020     Priority: Medium     Acute respiratory failure with hypoxia and hypercapnia (H) 02/27/2020     Priority: Medium     Acute pulmonary embolism (H) 02/27/2020     Priority: Medium     Chronic obstructive pulmonary disease with acute exacerbation (H) 02/18/2020     Priority: Medium     Influenza A 02/15/2020     Priority: Medium     Acute respiratory failure with hypoxia (H) 02/15/2020     Priority: Medium     Benign essential hypertension 02/15/2020     Priority: Medium     Respiratory failure (H) 02/15/2020     Priority: Medium     Hyperuricemia 04/07/2015     Priority: Medium     ED (erectile dysfunction) 02/11/2015     Priority: Medium     Tobacco abuse 03/04/2014     Priority: Medium        Past Medical History:    Past Medical History:   Diagnosis Date     Abnormal liver function test 1/1/2011     Bronchitis, not specified as acute or chronic 2/16/2005     Osteoarthritis 1/1/2011     Prediabetes      Tobacco abuse " 2011     Unspecified essential hypertension 2006       Past Surgical History:    Past Surgical History:   Procedure Laterality Date     cysts removed from neck      Bilateral     vasectomy       wisdom teeth extraction         Family History:    Family History   Problem Relation Age of Onset     Heart Failure Father 72        CHF, cause of death     Diabetes Father      C.A.D. Mother      Diabetes Mother      C.A.D. Brother 51        Cause of death     Diabetes Brother      Hypertension Brother      Other - See Comments Brother         Multiple Sclerosis       Social History:  Marital Status:   [2]  Social History     Tobacco Use     Smoking status: Former Smoker     Packs/day: 1.00     Years: 37.00     Pack years: 37.00     Types: Cigarettes     Start date: 1980     Quit date: 2020     Years since quittin.2     Smokeless tobacco: Never Used     Tobacco comment: pt quite 18   Substance Use Topics     Alcohol use: No     Drug use: No        Medications:    albuterol (PROVENTIL) (2.5 MG/3ML) 0.083% neb solution  levofloxacin (LEVAQUIN) 500 MG tablet  predniSONE (DELTASONE) 20 MG tablet  acetaminophen (TYLENOL) 325 MG tablet  albuterol (PROAIR HFA/PROVENTIL HFA/VENTOLIN HFA) 108 (90 Base) MCG/ACT inhaler  allopurinol (ZYLOPRIM) 300 MG tablet  amLODIPine (NORVASC) 10 MG tablet  cloNIDine (CATAPRES-TTS3) 0.3 MG/24HR WK patch  ipratropium - albuterol 0.5 mg/2.5 mg/3 mL (DUONEB) 0.5-2.5 (3) MG/3ML neb solution  metoprolol tartrate (LOPRESSOR) 25 MG tablet  multivitamin w/minerals (MULTI-VITAMIN) tablet  nicotine (NICODERM CQ) 21 MG/24HR 24 hr patch  order for DME  order for DME  pantoprazole (PROTONIX) 40 MG EC tablet  Plant Sterols and Stanols (CHOLESTOFF) 450 MG TABS  rivaroxaban ANTICOAGULANT (XARELTO) 15 MG TABS tablet  simvastatin (ZOCOR) 40 MG tablet  UNABLE TO FIND          Review of Systems   Constitutional: Negative.    HENT: Negative.    Eyes: Negative.    Respiratory: Positive for  cough, shortness of breath and wheezing.    Cardiovascular: Negative.    Gastrointestinal: Negative.    Endocrine: Negative.    Genitourinary: Negative.    Musculoskeletal: Negative.    Skin: Negative.    Allergic/Immunologic: Negative.    Neurological: Negative.    Hematological: Negative.    Psychiatric/Behavioral: Negative.        Physical Exam   BP: (!) 227/159  Pulse: 109  Temp: 99.4  F (37.4  C)  Resp: (!) 28  SpO2: 93 % (91-94)      Physical Exam  Vitals and nursing note reviewed.   Constitutional:       Appearance: He is well-developed and normal weight.   HENT:      Head: Normocephalic.      Mouth/Throat:      Mouth: Mucous membranes are moist.      Pharynx: Oropharynx is clear.   Eyes:      Extraocular Movements: Extraocular movements intact.      Pupils: Pupils are equal, round, and reactive to light.   Cardiovascular:      Rate and Rhythm: Normal rate and regular rhythm.   Pulmonary:      Effort: Pulmonary effort is normal. Tachypnea present.      Breath sounds: Examination of the right-upper field reveals wheezing. Examination of the left-upper field reveals wheezing. Examination of the right-middle field reveals decreased breath sounds and wheezing. Examination of the left-middle field reveals decreased breath sounds and wheezing. Examination of the right-lower field reveals decreased breath sounds and wheezing. Examination of the left-lower field reveals decreased breath sounds and wheezing. Decreased breath sounds and wheezing present.   Abdominal:      General: Bowel sounds are normal.      Palpations: Abdomen is soft.   Musculoskeletal:         General: Normal range of motion.   Skin:     General: Skin is warm and dry.   Neurological:      General: No focal deficit present.      Mental Status: He is alert and oriented to person, place, and time.   Psychiatric:         Mood and Affect: Mood normal.         Behavior: Behavior normal.         ED Course     Labs: Troponin, CBC with differential, BMP,  lactic acid, UA    Treatments: Albuterol neb treatment x2, Solu-Medrol 125 mg IV, magnesium 2 g IV.     Peripheral saline lock: EKG, x-ray chest portable 1 view           ED Course as of 04/20/22 2151   Fri Apr 15, 2022   1239 Differentials:  COPD exacerbation  Airway obstruction  Pneumonia  Pulmonary embolism  Pulmonary hypertension  Tension pneumothorax  Cardiogenic pulmonary edema (CHF)  Pericarditis   myocarditis            EKG Interpretation:      Interpreted by LEONOR Rivas CNP  Time reviewed: 1304  Symptoms at time of EKG:SOB  Rhythm: normal sinus   Rate: normal 97  Axis: normal  Ectopy: none  Conduction: normal  ST Segments/ T Waves: No ST-T wave changes  Q Waves: none  Comparison to prior:  UNCHANGED    Clinical Impression: abnormal EKG                   No results found for this or any previous visit (from the past 24 hour(s)).    Medications   albuterol (PROVENTIL) neb solution 2.5 mg (2.5 mg Nebulization Given 4/15/22 1236)   methylPREDNISolone sodium succinate (solu-MEDROL) injection 125 mg (125 mg Intravenous Given 4/15/22 1243)   magnesium sulfate 2 g in water intermittent infusion (0 g Intravenous Stopped 4/15/22 1437)   albuterol (PROVENTIL) neb solution 2.5 mg (2.5 mg Nebulization Given 4/15/22 1337)   ipratropium - albuterol 0.5 mg/2.5 mg/3 mL (DUONEB) neb solution 3 mL (3 mLs Nebulization Given 4/15/22 1442)   cloNIDine (CATAPRES) tablet 0.1 mg (0.1 mg Oral Given 4/15/22 1500)       Assessments & Plan (with Medical Decision Making)   Findings as above.  On assessment lungs have wheezing bilaterally inspiratory and expiratory, diminished throughout but clear.  Heart sounds S1 normal, S2 normal rate is normal rate and regular.  There is no lower extremity edema.    In ER patient received Solu-Medrol 125 mg IV, 2 duo nebs, 2 albuterol nebs, magnesium sulfate 2 g IV.  Patient reports improvement in breathing and is was ready for discharge to home.  Saturations improved to 92%    Results: Patient is  negative for influenza a or B, RSV, or COVID.  UA is negative for infection.  BNP is normal.  Lactic acid normal.  BMP is normal with slightly elevated glucose at 160.  CBC normal.  Troponin normal.     I discussed with patient lab results and treatment for COPD exacerbation. as labs are normal I suspect this is COPD exacerbation as patient has wheezing throughout bilaterally and diminished lung sounds.  I do not suspect pneumonia as chest x-ray is negative and lab work is normal.  I discussed patient's BNP being normal so I can rule out CHF.  Patient does not have chest pain so I do not suspect tension pneumothorax, pericarditis, myocarditis, pulmonary hypertension, or pulmonary embolism.     Discharge plan: I discussed with patient treatment with Levaquin 500 mg antibiotics as he is COPD exacerbation.  He was started on prednisone 40 mg for 5 days.  Patient will also use albuterol neb treatments at home with his nebulizer.  Patient has been instructed if his symptoms worsen or he develops any new concerning symptoms to return to ER.    X-ray chest portable 1 view: IMPRESSION:     No acute cardiopulmonary process.       I have reviewed the nursing notes.    I have reviewed the findings, diagnosis, plan and need for follow up with the patient.      Discharge Medication List as of 4/15/2022  2:38 PM      START taking these medications    Details   azithromycin (ZITHROMAX) 500 MG tablet Take 2 tablets (500 mg) today, then take 1 tablet daily on days 2 through 5., Disp-7 tablet, R-0, Local Print      predniSONE (DELTASONE) 20 MG tablet Take two tablets (= 40mg) each day for 5 (five) days, Disp-10 tablet, R-0, Local Print             Final diagnoses:   COPD exacerbation (H)       4/15/2022   [facility]     Shanda Em APRN CNP  04/20/22 1789

## 2022-04-15 NOTE — ED NOTES
Pt arrived to ED after baseline shortness of breath significantly increased. Sats 88-91%. Audible inspiratory and expiratory wheezing. Pt denies pain or nausea. Remaining vitals stable. 18 gauge IV placed in right AC and labs drawn and sent. Xray and provider at bedside.

## 2022-04-15 NOTE — ED NOTES
Pt to be discharged. IV removed. AVS and prescriptions reviewed and pt's questions answered. Able to breathe better. Pt states that he feels much better. A & O x 4. Vitals stable. BP still elevated. Provider aware. Able to ambulate independently. No further issues.

## 2022-04-16 LAB
FLUAV RNA SPEC QL NAA+PROBE: NEGATIVE
FLUBV RNA RESP QL NAA+PROBE: NEGATIVE
RSV RNA SPEC NAA+PROBE: NEGATIVE
SARS-COV-2 RNA RESP QL NAA+PROBE: NEGATIVE

## 2022-04-28 ENCOUNTER — TELEPHONE (OUTPATIENT)
Dept: FAMILY MEDICINE | Facility: OTHER | Age: 57
End: 2022-04-28
Payer: COMMERCIAL

## 2022-04-28 ENCOUNTER — OFFICE VISIT (OUTPATIENT)
Dept: FAMILY MEDICINE | Facility: OTHER | Age: 57
End: 2022-04-28
Attending: NURSE PRACTITIONER
Payer: COMMERCIAL

## 2022-04-28 VITALS
OXYGEN SATURATION: 94 % | SYSTOLIC BLOOD PRESSURE: 148 MMHG | DIASTOLIC BLOOD PRESSURE: 98 MMHG | WEIGHT: 221.7 LBS | HEART RATE: 75 BPM | TEMPERATURE: 98.6 F | BODY MASS INDEX: 30.07 KG/M2

## 2022-04-28 DIAGNOSIS — J44.1 COPD EXACERBATION (H): Primary | ICD-10-CM

## 2022-04-28 PROCEDURE — 99213 OFFICE O/P EST LOW 20 MIN: CPT | Performed by: NURSE PRACTITIONER

## 2022-04-28 RX ORDER — ASPIRIN 81 MG/1
81 TABLET, CHEWABLE ORAL DAILY
COMMUNITY
End: 2024-07-18

## 2022-04-28 ASSESSMENT — PAIN SCALES - GENERAL: PAINLEVEL: NO PAIN (0)

## 2022-04-28 NOTE — NURSING NOTE
Chief Complaint   Patient presents with     Copd Exacerbation       Initial BP (!) 154/102 (BP Location: Right arm, Patient Position: Chair, Cuff Size: Adult Regular)   Pulse 75   Temp 98.6  F (37  C) (Tympanic)   Wt 100.6 kg (221 lb 11.2 oz)   SpO2 94%   BMI 30.07 kg/m   Estimated body mass index is 30.07 kg/m  as calculated from the following:    Height as of 4/20/20: 1.829 m (6').    Weight as of this encounter: 100.6 kg (221 lb 11.2 oz).  Medication Reconciliation: complete  Francesca Siddiqi

## 2022-04-28 NOTE — TELEPHONE ENCOUNTER
Emergency Department and Urgent Care Follow-up      Reason for ER/UC visit: Shortness of Breath - hx COPD  o Date seen: 4/15/22      New or Worsening symptoms:  No        Prescription Received/Picked up from Pharmacy?: yes   o Medications started? Prednisone, albuteral and levaquin  o Any questions or issues regarding your prescription?: no       Follow-up Results or Labs that are pending: no       Questions or concerns?: in need of work excuse to return to work, symptoms improved, completed prednisone and       ER Recommends Follow-up by: with PCP no time frame provided       RN Recommendations:   o Appointment scheduled:   o Patient requesting appointment as soon as possible as work will not allow pt to return until he has a follow up and return to work note.   o Appointment scheduled:  Next 5 appointments (look out 90 days)    Apr 28, 2022  3:00 PM  (Arrive by 2:45 PM)  SHORT with Consuelo Pizarro NP  [facility] ([facility] ) [address]  [facility] [address]  [phone]      o   o         If you start feeling worse, or have any further questions, please feel free to contact Nurse Triage at [phone].  If needing immediate medical attention at any time please call 911/Go to the ER.

## 2022-04-28 NOTE — PROGRESS NOTES
"  Assessment & Plan     1. COPD exacerbation (H)  Improved.   Work note provided    Review of prior external note(s) from - CareEverywhere information from ED and triage notes reviewed    Blood pressure elevated today, states has not taken medication yet.      Encouraged to take medication, follow-up with PCP for blood pressure control.    Consuelo Pizarro NP  [facility] [place] TREVA Raza is a 56 year old who presents for the following health issues     HPI     ED/ Followup:    Facility:  [facility]   Date of visit: 4/15/2022  Reason for visit: COPD Exacerbation   Current Status: \"improved immensely\"     Requesting return to work note.  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        Recent Labs   Lab Test 04/15/22  1221 03/13/20  0000 02/24/20  0440 02/23/20  0445 02/22/20  0426 02/21/20  0452 02/20/20  0450 02/18/20  1258 02/18/20  0448 02/17/20  0455 02/16/20  0437   A1C  --  6.4*  --   --   --   --   --   --   --   --  6.4*   LDL  --  86  --   --   --   --   --   --   --   --   --    HDL  --  37*  --   --   --   --   --   --   --   --   --    TRIG  --  102  --   --   --   --   --   --  180*  --   --    ALT  --  77*  --   --   --  45 30   < > 27   < > 30   CR 1.01 0.7 0.60* 0.63*   < > 0.62* 0.59*   < > 0.77   < > 1.11   GFRESTIMATED 86 >60 >90 >90   < > >90 >90   < > >90   < > 74   GFRESTBLACK  --   --  >90 >90   < > >90 >90   < > >90   < > 85   POTASSIUM 4.2 4.1 4.2 4.3   < > 4.1 3.8   < > 4.9   < > 5.0    < > = values in this interval not displayed.      BP Readings from Last 3 Encounters:   04/28/22 (!) 148/98   04/15/22 (!) 168/135   04/20/20 138/68    Wt Readings from Last 3 Encounters:   04/28/22 100.6 kg (221 lb 11.2 oz)   04/20/20 80.7 kg (178 lb)   02/24/20 89.2 kg (196 lb 10.4 oz)                          Review of Systems   Constitutional, HEENT, cardiovascular, pulmonary, gi and gu systems are negative, except as otherwise noted.      Objective    BP " (!) 148/98 (BP Location: Right arm, Patient Position: Chair, Cuff Size: Adult Large)   Pulse 75   Temp 98.6  F (37  C) (Tympanic)   Wt 100.6 kg (221 lb 11.2 oz)   SpO2 94%   BMI 30.07 kg/m    Body mass index is 30.07 kg/m .  Physical Exam   GENERAL: healthy, alert and no distress  RESP: lungs clear to auscultation - no rales, rhonchi or wheezes  CV: regular rate and rhythm, normal S1 S2, no S3 or S4, no murmur, click or rub, no peripheral edema and peripheral pulses strong  MS: no gross musculoskeletal defects noted, no edema  PSYCH: mentation appears normal, affect normal/bright

## 2022-04-28 NOTE — LETTER
[facility]  [address]  [facility] [address]  Phone: [phone]    April 28, 2022        Ry SHARA Man  [address] [place] [address]          To whom it may concern:    RE: Ry Carterw    Patient was seen and treated today at our clinic.  Patient may return to work April 29, 2022 with the following:  No working or lifting restrictions    Please contact me for questions or concerns.      Sincerely,        Consuelo Pizarro NP

## 2022-08-17 ENCOUNTER — TELEPHONE (OUTPATIENT)
Dept: OTOLARYNGOLOGY | Facility: OTHER | Age: 57
End: 2022-08-17

## 2022-08-17 NOTE — TELEPHONE ENCOUNTER
Patient called stating has a tip of his hearing aid stuck in his ear. Would like to be seen today. Please call patient back at [phone]

## 2023-04-11 ENCOUNTER — NURSE TRIAGE (OUTPATIENT)
Dept: FAMILY MEDICINE | Facility: OTHER | Age: 58
End: 2023-04-11

## 2023-04-11 ENCOUNTER — OFFICE VISIT (OUTPATIENT)
Dept: FAMILY MEDICINE | Facility: OTHER | Age: 58
End: 2023-04-11
Attending: FAMILY MEDICINE
Payer: COMMERCIAL

## 2023-04-11 VITALS
HEIGHT: 72 IN | HEART RATE: 69 BPM | DIASTOLIC BLOOD PRESSURE: 102 MMHG | SYSTOLIC BLOOD PRESSURE: 148 MMHG | TEMPERATURE: 98 F | RESPIRATION RATE: 18 BRPM | BODY MASS INDEX: 31.02 KG/M2 | OXYGEN SATURATION: 94 % | WEIGHT: 229 LBS

## 2023-04-11 DIAGNOSIS — I10 BENIGN ESSENTIAL HYPERTENSION: Primary | ICD-10-CM

## 2023-04-11 DIAGNOSIS — Z87.891 PERSONAL HISTORY OF TOBACCO USE: ICD-10-CM

## 2023-04-11 DIAGNOSIS — R73.01 IMPAIRED FASTING GLUCOSE: ICD-10-CM

## 2023-04-11 LAB
ANION GAP SERPL CALCULATED.3IONS-SCNC: 13 MMOL/L (ref 7–15)
BUN SERPL-MCNC: 19.8 MG/DL (ref 8–23)
CALCIUM SERPL-MCNC: 9.5 MG/DL (ref 8.6–10)
CHLORIDE SERPL-SCNC: 103 MMOL/L (ref 98–107)
CREAT SERPL-MCNC: 0.92 MG/DL (ref 0.67–1.17)
DEPRECATED HCO3 PLAS-SCNC: 23 MMOL/L (ref 22–29)
EST. AVERAGE GLUCOSE BLD GHB EST-MCNC: 169 MG/DL
GFR SERPL CREATININE-BSD FRML MDRD: >90 ML/MIN/1.73M2
GLUCOSE SERPL-MCNC: 201 MG/DL (ref 70–99)
HBA1C MFR BLD: 7.5 %
POTASSIUM SERPL-SCNC: 4.2 MMOL/L (ref 3.4–5.3)
SODIUM SERPL-SCNC: 139 MMOL/L (ref 136–145)
TSH SERPL DL<=0.005 MIU/L-ACNC: 0.56 UIU/ML (ref 0.3–4.2)

## 2023-04-11 PROCEDURE — 99213 OFFICE O/P EST LOW 20 MIN: CPT | Performed by: FAMILY MEDICINE

## 2023-04-11 PROCEDURE — 80048 BASIC METABOLIC PNL TOTAL CA: CPT | Performed by: FAMILY MEDICINE

## 2023-04-11 PROCEDURE — 83036 HEMOGLOBIN GLYCOSYLATED A1C: CPT | Performed by: FAMILY MEDICINE

## 2023-04-11 PROCEDURE — G0296 VISIT TO DETERM LDCT ELIG: HCPCS | Performed by: FAMILY MEDICINE

## 2023-04-11 PROCEDURE — 36415 COLL VENOUS BLD VENIPUNCTURE: CPT | Performed by: FAMILY MEDICINE

## 2023-04-11 PROCEDURE — 84443 ASSAY THYROID STIM HORMONE: CPT | Performed by: FAMILY MEDICINE

## 2023-04-11 RX ORDER — MOMETASONE FUROATE 200 UG/1
2 AEROSOL RESPIRATORY (INHALATION) 2 TIMES DAILY
COMMUNITY
Start: 2023-01-14

## 2023-04-11 RX ORDER — METOPROLOL TARTRATE 50 MG
50 TABLET ORAL 2 TIMES DAILY
Qty: 90 TABLET | Refills: 3 | Status: SHIPPED | OUTPATIENT
Start: 2023-04-11 | End: 2023-05-12

## 2023-04-11 RX ORDER — TIOTROPIUM BROMIDE AND OLODATEROL 3.124; 2.736 UG/1; UG/1
SPRAY, METERED RESPIRATORY (INHALATION)
COMMUNITY
Start: 2023-03-20

## 2023-04-11 ASSESSMENT — PAIN SCALES - GENERAL: PAINLEVEL: NO PAIN (0)

## 2023-04-11 NOTE — PROGRESS NOTES
Lung Cancer Screening Shared Decision Making Visit     Ry Man, a 57 year old male, is eligible for lung cancer screening    History   Smoking Status     Former     Packs/day: 1.00     Years: 37.00     Types: Cigarettes     Start date: 1/1/1980     Quit date: 2/1/2020   Smokeless Tobacco     Never       I have discussed with patient the risks and benefits of screening for lung cancer with low-dose CT.     The risks include:    radiation exposure: one low dose chest CT has as much ionizing radiation as about 15 chest x-rays, or 6 months of background radiation living in [place]      false positives: most findings/nodules are NOT cancer, but some might still require additional diagnostic evaluation, including biopsy    over-diagnosis: some slow growing cancers that might never have been clinically significant will be detected and treated unnecessarily     The benefit of early detection of lung cancer is contingent upon adherence to annual screening or more frequent follow up if indicated.     Furthermore, to benefit from screening, Ry must be willing and able to undergo diagnostic procedures, if indicated. Although no specific guide is available for determining severity of comorbidities, it is reasonable to withhold screening in patients who have greater mortality risk from other diseases.     We did discuss that the best way to prevent lung cancer is to not smoke.    Some patients may value a numeric estimation of lung cancer risk when evaluating if lung cancer screening is right for them, here is one calculator:    ShouldIScreen

## 2023-04-11 NOTE — PROGRESS NOTES
Assessment & Plan     Benign essential hypertension  BP here elevated, but not critical. Pt asymptomatic. Okay to return to work as a . Metoprolol increased as below (pt believes he had had poor reaction to losartan and lisinopril in the past). No other s/s of HF, however, suspect COPD is remains very symptomatic based on brief discussion. Return in 1 week for BP recheck. Labs today. Set up PCP follow up   - metoprolol tartrate (LOPRESSOR) 50 MG tablet  Dispense: 90 tablet; Refill: 3  - Basic metabolic panel  - TSH with free T4 reflex    Personal history of tobacco use  - Prof fee: Shared Decision Making for Lung Cancer Screening  - CT Chest Lung Cancer Scrn Low Dose wo    Impaired fasting glucose  - Hemoglobin A1c    25 minutes spent by me on the date of the encounter doing chart review, review of test results, interpretation of tests, patient visit and documentation     Return in about 3 weeks (around 5/2/2023) for BP recheck.    Ashlee Shaikh MD  [facility] - RADHA Raza is a 57 year old, presenting for the following health issues:  Hypertension         View : No data to display.              HPI     Hypertension Follow-up      Do you check your blood pressure regularly outside of the clinic? No     Are you following a low salt diet? Yes    Are your blood pressures ever more than 140 on the top number (systolic) OR more   than 90 on the bottom number (diastolic), for example 140/90? Yes    [facility] Medications reviewed. BP has been running high at home as well. Maybe a headache here and there, but he associates with using inhaler too much.     No vision changes, speech changes, no other neuro sx. Pt does not have hx of stroke. Not on blood thinners.     Review of Systems   Constitutional, HEENT, cardiovascular, pulmonary, gi and gu systems are negative, except as otherwise noted.      Objective    BP (!) 148/102 (BP Location: Left arm, Patient  Position: Sitting, Cuff Size: Adult Regular)   Pulse 69   Temp 98  F (36.7  C) (Tympanic)   Resp 18   Ht 1.829 m (6')   Wt 103.9 kg (229 lb)   SpO2 94%   BMI 31.06 kg/m    Body mass index is 31.06 kg/m .  Physical Exam   GENERAL: alert and no distress  NECK: no adenopathy, no asymmetry, masses, or scars and thyroid normal to palpation  RESP: lungs clear to auscultation - no rales, rhonchi or wheezes  CV: regular rates and rhythm, normal S1 S2, no S3 or S4, no murmur, click or rub and no peripheral edema  MS: no gross musculoskeletal defects noted, no edema  SKIN: no suspicious lesions or rashes  NEURO: mentation intact    No results found for any visits on 04/11/23.

## 2023-04-11 NOTE — PATIENT INSTRUCTIONS
Lung Cancer Screening   Frequently Asked Questions  If you are at high-risk for lung cancer, getting screened with low-dose computed tomography (LDCT) every year can help save your life. This handout offers answers to some of the most common questions about lung cancer screening. If you have other questions, please call [phone] ([phone]).     What is it?  Lung cancer screening uses special X-ray technology to create an image of your lung tissue. The exam is quick and easy and takes less than 10 seconds. We don t give you any medicine or use any needles. You can eat before and after the exam. You don t need to change your clothes as long as the clothing on your chest doesn t contain metal. But, you do need to be able to hold your breath for at least 6 seconds during the exam.    What is the goal of lung cancer screening?  The goal of lung cancer screening is to save lives. Many times, lung cancer is not found until a person starts having physical symptoms. Lung cancer screening can help detect lung cancer in the earliest stages when it may be easier to treat.    Who should be screened for lung cancer?  We suggest lung cancer screening for anyone who is at high-risk for lung cancer. You are in the high-risk group if you:      are between the ages of 55 and 79, and    have smoked at least 1 pack of cigarettes a day for 20 or more years, and    still smoke or have quit within the past 15 years.    However, if you have a new cough or shortness of breath, you should talk to your doctor before being screened.    Why does it matter if I have symptoms?  Certain symptoms can be a sign that you have a condition in your lungs that should be checked and treated by your doctor. These symptoms include fever, chest pain, a new or changing cough, shortness of breath that you have never felt before, coughing up blood or unexplained weight loss. Having any of these symptoms can greatly affect the results of lung  cancer screening.       Should all smokers get an LDCT lung cancer screening exam?  It depends. Lung cancer screening is for a very specific group of men and women who have a history of heavy smoking over a long period of time (see  Who should be screened for lung cancer  above).  I am in the high-risk group, but have been diagnosed with cancer in the past. Is LDCT lung cancer screening right for me?  In some cases, you should not have LDCT lung screening, such as when your doctor is already following your cancer with CT scan studies. Your doctor will help you decide if LDCT lung screening is right for you.  Do I need to have a screening exam every year?  Yes. If you are in the high-risk group described earlier, you should get an LDCT lung cancer screening exam every year until you are 79, or are no longer willing or able to undergo screening and possible procedures to diagnose and treat lung cancer.  How effective is LDCT at preventing death from lung cancer?  Studies have shown that LDCT lung cancer screening can lower the risk of death from lung cancer by 20 percent in people who are at high-risk.  What are the risks?  There are some risks and limitations of LDCT lung cancer screening. We want to make sure you understand the risks and benefits, so please let us know if you have any questions. Your doctor may want to talk with you more about these risks.    Radiation exposure: As with any exam that uses radiation, there is a very small increased risk of cancer. The amount of radiation in LDCT is small--about the same amount a person would get from a mammogram. Your doctor orders the exam when he or she feels the potential benefits outweigh the risks.    False negatives: No test is perfect, including LDCT. It is possible that you may have a medical condition, including lung cancer, that is not found during your exam. This is called a false negative result.    False positives and more testing: LDCT very often finds  something in the lung that could be cancer, but in fact is not. This is called a false positive result. False positive tests often cause anxiety. To make sure these findings are not cancer, you may need to have more tests. These tests will be done only if you give us permission. Sometimes patients need a treatment that can have side effects, such as a biopsy. For more information on false positives, see  What can I expect from the results?     Findings not related to lung cancer: Your LDCT exam also takes pictures of areas of your body next to your lungs. In a very small number of cases, the CT scan will show an abnormal finding in one of these areas, such as your kidneys, adrenal glands, liver or thyroid. This finding may not be serious, but you may need more tests. Your doctor can help you decide what other tests you may need, if any.  What can I expect from the results?  About 1 out of 4 LDCT exams will find something that may need more tests. Most of the time, these findings are lung nodules. Lung nodules are very small collections of tissue in the lung. These nodules are very common, and the vast majority--more than 97 percent--are not cancer (benign). Most are normal lymph nodes or small areas of scarring from past infections.  But, if a small lung nodule is found to be cancer, the cancer can be cured more than 90 percent of the time. To know if the nodule is cancer, we may need to get more images before your next yearly screening exam. If the nodule has suspicious features (for example, it is large, has an odd shape or grows over time), we will refer you to a specialist for further testing.  Will my doctor also get the results?  Yes. Your doctor will get a copy of your results.  Is it okay to keep smoking now that there s a cancer screening exam?  No. Tobacco is one of the strongest cancer-causing agents. It causes not only lung cancer, but other cancers and cardiovascular (heart) diseases as well. The damage  caused by smoking builds over time. This means that the longer you smoke, the higher your risk of disease. While it is never too late to quit, the sooner you quit, the better.  Where can I find help to quit smoking?  The best way to prevent lung cancer is to stop smoking. If you have already quit smoking, congratulations and keep it up! For help on quitting smoking, please call [employer] at 5-102-QUITNOW (1-901.358.2972) or the American [employer] at 1-647.300.1792 to find local resources near you.  One-on-one health coaching:  If you d prefer to work individually with a health care provider on tobacco cessation, we offer:      Medication Therapy Management:  Our specially trained pharmacists work closely with you and your doctor to help you quit smoking.  Call [phone] or [phone] (toll free).

## 2023-04-11 NOTE — TELEPHONE ENCOUNTER
"Patient had work physical at [facility] this a.m.  /103  Sent home & restricted from returning until seen by a Provider  Needs a letter to return to work   Takes BP meds as ordered  Has a home cuff but does not use  States feeling \"fine\" at time of the call    Scheduled with Dr. Shaikh today     Reason for Disposition    Systolic BP >= 180 OR Diastolic >= 110, and missed most recent dose of blood pressure medication    Additional Information    Negative: Sounds like a life-threatening emergency to the triager    Negative: Symptom is main concern (e.g., headache, chest pain)    Negative: Low blood pressure is main concern    Negative: Systolic BP >= 160 OR Diastolic >= 100, and any cardiac or neurologic symptoms (e.g., chest pain, difficulty breathing, unsteady gait, blurred vision)    Negative: Pregnant 20 or more weeks (or postpartum < 6 weeks) with new hand or face swelling    Negative: Pregnant 20 or more weeks (or postpartum < 6 weeks) and Systolic BP >= 160 OR Diastolic >= 100    Negative: Patient sounds very sick or weak to the triager    Negative: Systolic BP >= 200 OR Diastolic >= 120 and having NO cardiac or neurologic symptoms    Negative: Pregnant 20 or more weeks (or postpartum < 6 weeks) with Systolic BP >= 140 OR Diastolic >= 90    Answer Assessment - Initial Assessment Questions  1. BLOOD PRESSURE: \"What is the blood pressure?\" \"Did you take at least two measurements 5 minutes apart?\"      This a.m. 180/103        Work physical    2. ONSET: \"When did you take your blood pressure?\"      This a.m. at work physical    3. HOW: \"How did you obtain the blood pressure?\" (e.g., visiting nurse, automatic home BP monitor)      BP cuff    4. HISTORY: \"Do you have a history of high blood pressure?\"      Yes    5. MEDICATIONS: \"Are you taking any medications for blood pressure?\" \"Have you missed any doses recently?\"      Amlodipine, metoprolol, colonidine     6. OTHER SYMPTOMS: \"Do you have any symptoms?\" " (e.g., headache, chest pain, blurred vision, difficulty breathing, weakness)      Covid + Feb 2020.         Lost 60 lbs      Recovery in nursing home    Protocols used: BLOOD PRESSURE - HIGH-A-OH

## 2023-04-11 NOTE — LETTER
April 11, 2023      Ry Man  [address] [place] [address]        To Whom It May Concern:    Ry Man was seen in our clinic. He may return to work without restrictions at this time.      Sincerely,        Ashlee Shaikh MD

## 2023-04-17 NOTE — PROGRESS NOTES
Assessment & Plan     Essential hypertension  Seen in the clinic on 4/11. Increased metoprolol to 50 mg BID. Continues on amlodipine, clonidine patch. BP is quite elevated today- initial reading 190/108 with repeat of 168/110. Home blood pressures reviewed and somewhat more controlled, see below. Asymptomatic at this time. BMP stable last week. No signs of fluid overload. Reports he is not able to take ACE/ARB. Discussed addition of thiazide diuretic. Has been on hydrochlorothiazide in the past and would like to start this over chlorthalidone. Start 12.5 mg hydrochlorothiazide. Follow-up in 2 weeks to review BPs and obtain BMP. Discussed to call or be seen if becoming symptomatic or with new concerns.   - hydrochlorothiazide (HYDRODIURIL) 12.5 MG tablet  Dispense: 30 tablet; Refill: 0    Type 2 diabetes mellitus without complication, without long-term current use of insulin (H)  A!C of 7.5 noted on labs last week. Discussed starting daily metformin, reviewed side effects. Referral placed to diabetes education.   - Diabetes Education Referral (Antolin)  - metFORMIN (GLUCOPHAGE) 500 MG tablet  Dispense: 30 tablet; Refill: 0      30 minutes spent by me on the date of the encounter doing chart review, history and exam, documentation and further activities per the note         Return in about 2 weeks (around 5/2/2023) for Follow up.    LEONOR Rodriguez [facility] - ASHLEYEMMA Raza is a 57 year old, presenting for the following health issues:  Recheck Medication and Hypertension         View : No data to display.              HPI     Hypertension Follow-up      Do you check your blood pressure regularly outside of the clinic? Yes     Are you following a low salt diet? Yes    Are your blood pressures ever more than 140 on the top number (systolic) OR more   than 90 on the bottom number (diastolic), for example 140/90? Yes      How many servings of fruits and vegetables do you eat daily?   0-1    On average, how many sweetened beverages do you drink each day (Examples: soda, juice, sweet tea, etc.  Do NOT count diet or artificially sweetened beverages)?   0    How many days per week do you exercise enough to make your heart beat faster? 5    How many minutes a day do you exercise enough to make your heart beat faster? 30 - 60    How many days per week do you miss taking your medication? 0    Home blood pressure readings since previous apt as follows-    18/105, 132/82, 143/91, 178/100, 162/97, 151/88, 151/88, 151/97, 162/97    Currently taking metoprolol 50 mg BID, clonidine patch, and amlodipine.    Reports difficulty with multiple previous blood pressure medications in the past due to side effects. Has not tolerated lisinopril or losartan.     Denies any headaches, vision changes, speech changes, chest pain, dyspnea, dizziness. No edema. 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     [facility]     Review of Systems   CONSTITUTIONAL: NEGATIVE for fever, chills, change in weight  INTEGUMENTARY/SKIN: NEGATIVE for worrisome rashes, moles or lesions  EYES: NEGATIVE for vision changes or irritation  RESP: NEGATIVE for significant cough or SOB  CV: NEGATIVE for chest pain, palpitations or peripheral edema  GI: NEGATIVE for nausea, abdominal pain, heartburn, or change in bowel habits  MUSCULOSKELETAL: NEGATIVE for significant arthralgias or myalgia  NEURO: NEGATIVE for weakness, dizziness or paresthesias  ENDOCRINE: NEGATIVE for temperature intolerance, skin/hair changes  HEME: NEGATIVE for bleeding problems  PSYCHIATRIC: NEGATIVE for changes in mood or affect      Objective    BP (!) 168/110   Pulse 60   Temp 97.6  F (36.4  C)   Resp 24   Wt 104.1 kg (229 lb 9.6 oz)   SpO2 96%   BMI 31.14 kg/m    Body mass index is 31.14 kg/m .     Physical Exam   GENERAL: alert and no distress  NECK: no adenopathy, no asymmetry, masses, or scars and thyroid normal to palpation  RESP:  lungs clear to auscultation - no rales, rhonchi or wheezes  CV: regular rate and rhythm, normal S1 S2, no S3 or S4, no murmur, click or rub, no peripheral edema and peripheral pulses strong  ABDOMEN: soft, nontender, no hepatosplenomegaly, no masses and bowel sounds normal  MS: no gross musculoskeletal defects noted, no edema  SKIN: no suspicious lesions or rashes  NEURO: Normal strength and tone, mentation intact and speech normal

## 2023-04-18 ENCOUNTER — OFFICE VISIT (OUTPATIENT)
Dept: FAMILY MEDICINE | Facility: OTHER | Age: 58
End: 2023-04-18
Payer: COMMERCIAL

## 2023-04-18 VITALS
RESPIRATION RATE: 24 BRPM | OXYGEN SATURATION: 96 % | WEIGHT: 229.6 LBS | HEART RATE: 60 BPM | SYSTOLIC BLOOD PRESSURE: 168 MMHG | DIASTOLIC BLOOD PRESSURE: 110 MMHG | TEMPERATURE: 97.6 F | BODY MASS INDEX: 31.14 KG/M2

## 2023-04-18 DIAGNOSIS — E11.9 TYPE 2 DIABETES MELLITUS WITHOUT COMPLICATION, WITHOUT LONG-TERM CURRENT USE OF INSULIN (H): ICD-10-CM

## 2023-04-18 DIAGNOSIS — I10 ESSENTIAL HYPERTENSION: Primary | ICD-10-CM

## 2023-04-18 PROCEDURE — 99214 OFFICE O/P EST MOD 30 MIN: CPT

## 2023-04-18 RX ORDER — PREDNISONE 20 MG/1
TABLET ORAL
COMMUNITY
Start: 2022-10-12 | End: 2023-05-24

## 2023-04-18 RX ORDER — HYDROCORTISONE ACETATE 0.5 %
CREAM (GRAM) TOPICAL
COMMUNITY

## 2023-04-18 RX ORDER — BACILLUS COAGULANS/INULIN 1B-250 MG
CAPSULE ORAL
COMMUNITY

## 2023-04-18 RX ORDER — DOXYCYCLINE HYCLATE 100 MG
TABLET ORAL
COMMUNITY
Start: 2023-03-20 | End: 2023-05-24

## 2023-04-18 RX ORDER — MULTIVITAMIN WITH IRON
1 TABLET ORAL DAILY
COMMUNITY
End: 2024-07-18

## 2023-04-18 RX ORDER — HYDROCHLOROTHIAZIDE 12.5 MG/1
12.5 TABLET ORAL DAILY
Qty: 30 TABLET | Refills: 0 | Status: SHIPPED | OUTPATIENT
Start: 2023-04-18 | End: 2023-05-12

## 2023-04-18 RX ORDER — VITAMIN E 268 MG
400 CAPSULE ORAL
COMMUNITY
End: 2024-07-18

## 2023-04-18 NOTE — PATIENT INSTRUCTIONS
Continue monitoring BP at home    I have sent in hydrochlorothiazide, please start this. We will follow-up in 2 weeks and repeat your labs.    I have also sent in metformin. Take this with food. Monitor for GI symptoms.     They will marylou you to schedule an apt with diabetes education

## 2023-05-02 ENCOUNTER — OFFICE VISIT (OUTPATIENT)
Dept: FAMILY MEDICINE | Facility: OTHER | Age: 58
End: 2023-05-02
Payer: COMMERCIAL

## 2023-05-02 VITALS
WEIGHT: 225.7 LBS | OXYGEN SATURATION: 94 % | BODY MASS INDEX: 30.57 KG/M2 | TEMPERATURE: 97.5 F | HEIGHT: 72 IN | DIASTOLIC BLOOD PRESSURE: 96 MMHG | HEART RATE: 59 BPM | RESPIRATION RATE: 18 BRPM | SYSTOLIC BLOOD PRESSURE: 154 MMHG

## 2023-05-02 DIAGNOSIS — E78.5 HYPERLIPIDEMIA, UNSPECIFIED HYPERLIPIDEMIA TYPE: ICD-10-CM

## 2023-05-02 DIAGNOSIS — I10 ESSENTIAL HYPERTENSION: Primary | ICD-10-CM

## 2023-05-02 DIAGNOSIS — E11.9 TYPE 2 DIABETES MELLITUS WITHOUT COMPLICATION, WITHOUT LONG-TERM CURRENT USE OF INSULIN (H): ICD-10-CM

## 2023-05-02 LAB
ANION GAP SERPL CALCULATED.3IONS-SCNC: 9 MMOL/L (ref 7–15)
BUN SERPL-MCNC: 17.5 MG/DL (ref 8–23)
CALCIUM SERPL-MCNC: 9.8 MG/DL (ref 8.6–10)
CHLORIDE SERPL-SCNC: 103 MMOL/L (ref 98–107)
CHOLEST SERPL-MCNC: 276 MG/DL
CREAT SERPL-MCNC: 0.93 MG/DL (ref 0.67–1.17)
DEPRECATED HCO3 PLAS-SCNC: 27 MMOL/L (ref 22–29)
GFR SERPL CREATININE-BSD FRML MDRD: >90 ML/MIN/1.73M2
GLUCOSE SERPL-MCNC: 128 MG/DL (ref 70–99)
HDLC SERPL-MCNC: 53 MG/DL
LDLC SERPL CALC-MCNC: 194 MG/DL
NONHDLC SERPL-MCNC: 223 MG/DL
POTASSIUM SERPL-SCNC: 4.6 MMOL/L (ref 3.4–5.3)
SODIUM SERPL-SCNC: 139 MMOL/L (ref 136–145)
TRIGL SERPL-MCNC: 145 MG/DL

## 2023-05-02 PROCEDURE — 99213 OFFICE O/P EST LOW 20 MIN: CPT

## 2023-05-02 PROCEDURE — 80048 BASIC METABOLIC PNL TOTAL CA: CPT

## 2023-05-02 PROCEDURE — 80061 LIPID PANEL: CPT

## 2023-05-02 PROCEDURE — 36415 COLL VENOUS BLD VENIPUNCTURE: CPT

## 2023-05-02 RX ORDER — ATORVASTATIN CALCIUM 40 MG/1
40 TABLET, FILM COATED ORAL DAILY
Qty: 30 TABLET | Refills: 0 | Status: SHIPPED | OUTPATIENT
Start: 2023-05-02 | End: 2023-05-12

## 2023-05-02 RX ORDER — HYDROCHLOROTHIAZIDE 25 MG/1
25 TABLET ORAL DAILY
Qty: 90 TABLET | Refills: 0 | Status: SHIPPED | OUTPATIENT
Start: 2023-05-02 | End: 2024-07-18

## 2023-05-02 RX ORDER — HYDROCHLOROTHIAZIDE 25 MG/1
12.5 TABLET ORAL DAILY
Status: CANCELLED | OUTPATIENT
Start: 2023-05-02

## 2023-05-02 ASSESSMENT — PAIN SCALES - GENERAL: PAINLEVEL: NO PAIN (0)

## 2023-05-02 NOTE — PROGRESS NOTES
Assessment & Plan     Essential hypertension  Asymptomatic. Overall improved readings since previous visits. BMP pending. Will increase hydrochlorothiazide to 25 mg daily as long as BMP is stable. Follow-up in 3 weeks with PCP.   - Basic metabolic panel    Type 2 diabetes mellitus without complication, without long-term current use of insulin (H)  A1C of 6.5 noted on 4/18. Started on metformin. Patient stating he will likely stop this as he desires to work on dietary modification and have his A1C rechecked. Will discuss further with PCP at the end of the month.     Hyperlipidemia, unspecified hyperlipidemia type  Requesting lipid profile today. Has not been on statin in several years.   - Lipid Profile (Chol, Trig, HDL, LDL calc)    20 minutes spent by me on the date of the encounter doing chart review, history and exam, documentation and further activities per the note         Follow-up is scheduled- has establish care apt on 5/24    No follow-ups on file.    LEONOR Rodriguez [facility] - RADHA Raza is a 57 year old, presenting for the following health issues:  Hypertension         View : No data to display.              HPI     Hypertension Follow-up      Do you check your blood pressure regularly outside of the clinic? Yes     Are you following a low salt diet? Yes    Are your blood pressures ever more than 140 on the top number (systolic) OR more   than 90 on the bottom number (diastolic), for example 140/90? Yes    4/11- Increased metoprolol   4/18- Started on hydrochlorothiazide 12.5 mg. Also started on metformin, A1C of 6.5.    Continues on clonidine patch, amlodipine in addition to metoprolol, metformin.    Home readings reviewed:  121/88, 119/83, 150/89, 152/87, 164/88, 140/88, 160/93, 153/92, 156/85    Review of Systems   Constitutional, HEENT, cardiovascular, pulmonary, GI, , musculoskeletal, neuro, skin, endocrine and psych systems are negative, except as  otherwise noted.      Objective    BP (!) 154/96   Pulse 59   Temp 97.5  F (36.4  C) (Tympanic)   Resp 18   Ht 1.829 m (6')   Wt 102.4 kg (225 lb 11.2 oz)   SpO2 94%   BMI 30.61 kg/m    Body mass index is 30.61 kg/m .     Physical Exam   GENERAL: alert and no distress  NECK: no adenopathy, no asymmetry, masses, or scars and thyroid normal to palpation  RESP: lungs clear, diminished to auscultation - no rales, rhonchi or wheezes  CV: regular rate and rhythm, normal S1 S2, no S3 or S4, no murmur, click or rub, no peripheral edema and peripheral pulses strong  ABDOMEN: soft, nontender, no hepatosplenomegaly, no masses and bowel sounds normal  MS: no gross musculoskeletal defects noted, no edema  SKIN: no suspicious lesions or rashes  NEURO: Normal strength and tone, mentation intact and speech normal  PSYCH: mentation appears normal, affect normal/bright

## 2023-05-03 ENCOUNTER — TELEPHONE (OUTPATIENT)
Dept: FAMILY MEDICINE | Facility: OTHER | Age: 58
End: 2023-05-03

## 2023-05-03 NOTE — TELEPHONE ENCOUNTER
LVM for pt to schedule a Session 1 (90min) with Cherrie jackson, or Oriana Rodriguez, or Oriana/Shanel Stevenson.

## 2023-05-10 ENCOUNTER — TELEPHONE (OUTPATIENT)
Dept: EDUCATION SERVICES | Facility: HOSPITAL | Age: 58
End: 2023-05-10

## 2023-05-12 ENCOUNTER — TELEPHONE (OUTPATIENT)
Dept: EDUCATION SERVICES | Facility: HOSPITAL | Age: 58
End: 2023-05-12

## 2023-05-12 DIAGNOSIS — E11.9 TYPE 2 DIABETES MELLITUS WITHOUT COMPLICATION, WITHOUT LONG-TERM CURRENT USE OF INSULIN (H): ICD-10-CM

## 2023-05-12 DIAGNOSIS — E78.5 HYPERLIPIDEMIA, UNSPECIFIED HYPERLIPIDEMIA TYPE: ICD-10-CM

## 2023-05-12 DIAGNOSIS — I10 BENIGN ESSENTIAL HYPERTENSION: ICD-10-CM

## 2023-05-12 DIAGNOSIS — I10 ESSENTIAL HYPERTENSION: ICD-10-CM

## 2023-05-12 RX ORDER — METOPROLOL TARTRATE 50 MG
50 TABLET ORAL 2 TIMES DAILY
Qty: 90 TABLET | Refills: 3 | Status: SHIPPED | OUTPATIENT
Start: 2023-05-12 | End: 2023-05-24

## 2023-05-12 RX ORDER — ATORVASTATIN CALCIUM 40 MG/1
40 TABLET, FILM COATED ORAL DAILY
Qty: 90 TABLET | Refills: 3 | Status: SHIPPED | OUTPATIENT
Start: 2023-05-12 | End: 2023-05-24

## 2023-05-12 RX ORDER — HYDROCHLOROTHIAZIDE 12.5 MG/1
12.5 TABLET ORAL DAILY
Qty: 90 TABLET | Refills: 3 | Status: SHIPPED | OUTPATIENT
Start: 2023-05-12 | End: 2023-05-24 | Stop reason: ALTCHOICE

## 2023-05-12 RX ORDER — METOPROLOL TARTRATE 50 MG
50 TABLET ORAL 2 TIMES DAILY
Qty: 90 TABLET | Refills: 3 | Status: SHIPPED | OUTPATIENT
Start: 2023-05-12 | End: 2023-05-24 | Stop reason: ALTCHOICE

## 2023-05-12 NOTE — TELEPHONE ENCOUNTER
[facility]  They do not have these 4 medications on file to be refilled  Patient states they were hard to work with & could not find resolution with that call  Writer offered to send refills to [facility] to avoid him running out of medication  Further offered to [facility]end to [facility] so patient can call back to confirm requests have been [facility] with [facility]  Writer recommended to reach out to the local [facility] in [place] to request assistance & gave phone number  Instructed to call back with any further questions or concerns.  Patient relayed understanding.  No further concerns at calls end.

## 2023-05-18 NOTE — PROGRESS NOTES
Assessment & Plan     Encounter for medical examination to establish care  Extensive chart review completed.  Multiple supplements on board - will ask MTM to review.    Type 2 diabetes mellitus without complication, without long-term current use of insulin (H)  Not at goal.  Does not monitor glucose.  a1c - too soon to repeat.  Just restarted metformin.  Discussed DRC - education on goals, medication administration, monitor/sensor, etc.  Referral placed.  - rosuvastatin (CRESTOR) 20 MG tablet; Take 1 tablet (20 mg) by mouth daily  - Diabetes Education Referral (Julian)  - Med Therapy Management Referral  - Adult Eye  Referral; Future  - Comprehensive metabolic panel (BMP + Alb, Alk Phos, ALT, AST, Total. Bili, TP); Future  - Hemoglobin A1c; Future    Essential hypertension  Not at goal.  Longstanding Clonidine patch and Norvasc (at max).  On hydrochlorothiazide 25 mg daily -newest agent for him.  He preferred this over chlorthalidone for some reason??  Prior intolerance to ACE/ARB. Consider trial again?  On metoprolol 50 mg BID.  Will change to Coreg for better BP control.  Start with 6.26 mg BID, but likely need higher.  Has home cuff.  Close follow up.  - Med Therapy Management Referral  - Echocardiogram Complete; Future  - carvedilol (COREG) 6.25 MG tablet; Take 1 tablet (6.25 mg) by mouth 2 times daily (with meals)    Hyperlipidemia, unspecified hyperlipidemia type  Prior statin x 2 intolerant.  Trial of Crestor 20 mg.  - Diabetes Education Referral (Julian)  - rosuvastatin (CRESTOR) 20 MG tablet; Take 1 tablet (20 mg) by mouth daily  - Med Therapy Management Referral    Stage 3 severe COPD by GOLD classification (H)  Managed by pulmonology.  - Med Therapy Management Referral    Personal history of tobacco use  Due for CT screening - ordered.  - Prof fee: Shared Decision Making for Lung Cancer Screening  - CT Chest Lung Cancer Scrn Low Dose wo; Future    Review of prior external note(s) from -  "CareEverywhere information from [facility] reviewed  50 minutes spent by me on the date of the encounter doing chart review, history and exam, documentation and further activities per the note       BMI:   Estimated body mass index is 29.93 kg/m  as calculated from the following:    Height as of this encounter: 1.829 m (6').    Weight as of this encounter: 100.1 kg (220 lb 11.2 oz).   Weight management plan: Discussed healthy diet and exercise guidelines    See Patient Instructions    Return in about 1 month (around 6/24/2023) for BP Recheck, diabetes.    Ana Stark MD  [facility] - RADHA Raza is a 57 year old, presenting for the following health issues:  Hypertension, Diabetes, Lipids, and COPD    HPI     Not seen by me since 2020 - re establish care; has seen other providers in our clinic recently for BP only.  [facility]   [facility] at [facility] - 2 weeks  In 2020 after COVID and COPD exacerbation requiring intubation.  Had PE at that time as well.    Tobacco - former - 37 pack years - quit 5 years ago - CT ordered prior - ordering again    MTM- inquiring about effects of supplements on blood pressure; takes multiple.    Diabetes Follow-up    How often are you checking your blood sugar? Not at all    What concerns do you have today about your diabetes? None     Do you have any of these symptoms? (Select all that apply)  No numbness or tingling in feet.  No redness, sores or blisters on feet.  No complaints of excessive thirst.  No reports of blurry vision.  No significant changes to weight.    Have you had a diabetic eye exam in the last 12 months? 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    Wears compression stockings/pants    Hyperlipidemia Follow-Up    Are you regularly taking any medication or supplement to lower your cholesterol?   No    Are you having muscle aches or other side effects that you think could be caused by your cholesterol lowering medication?  Yes- when he takes his cholesterol medication he is having muscle weakness     Remote Simvastatin    Recent Lipitor - side effects    LDL - 194    Agreeable to trying Crestor    Hypertension Follow-up    Do you check your blood pressure regularly outside of the clinic? Yes     Are you following a low salt diet? Yes    Are your blood pressures ever more than 140 on the top number (systolic) OR more   than 90 on the bottom number (diastolic), for example 140/90? Yes   metoprolol 50 mg BID - for BP only -   Hydrochlorothiazide 25 mg added 5/2023- this month - Ivon Mcmillan  Clonidine patch - started by the [facility] 10 mg for years.  Prior allergy/intolerance to ace/arb.    Home readings 130-140s/80s past few weeks.  Brought records with.    BP Readings from Last 2 Encounters:   05/24/23 (!) 160/100   05/02/23 (!) 154/96     Hemoglobin A1C (%)   Date Value   04/11/2023 7.5 (H)   03/13/2020 6.4 (H)   02/16/2020 6.4 (H)     LDL Cholesterol Calculated (mg/dL)   Date Value   05/02/2023 194 (H)   03/13/2020 86   05/07/2013 129         COPD Follow-Up    Overall, how are your COPD symptoms since your last clinic visit?  No change    How much fatigue or shortness of breath do you have when you are walking?  Same as usual    How much shortness of breath do you have when you are resting?  None    How often do you cough? Sometimes    Have you noticed any change in your sputum/phlegm?  No    Have you experienced a recent fever? No    Please describe how far you can walk without stopping to rest:  1-2 miles    How many flights of stairs are you able to walk up without stopping?  1    Have you had any Emergency Room Visits, Urgent Care Visits, or  Hospital Admissions because of your COPD since your last office visit?  No     Dr Sood - pulmonologist - [facility]    PFTs- severe - Gold 3    History   Smoking Status     Former     Packs/day: 1.00     Years: 37.00     Types: Cigarettes     Start date: 1/1/1980     Quit date: 2/1/2020   Smokeless Tobacco     Never     No results found for: FEV1, HMW2NQL          Review of Systems   Constitutional, HEENT, cardiovascular, pulmonary, gi and gu systems are negative, except as otherwise noted.      Objective    BP (!) 160/100 (BP Location: Right arm, Patient Position: Sitting, Cuff Size: Adult Regular)   Pulse 64   Temp 98  F (36.7  C) (Tympanic)   Ht 1.829 m (6')   Wt 100.1 kg (220 lb 11.2 oz)   SpO2 96%   BMI 29.93 kg/m    Body mass index is 29.93 kg/m .  Physical Exam   GENERAL: healthy, alert and no distress  EYES: Eyes grossly normal to inspection, PERRL and conjunctivae and sclerae normal  HENT: ear canals and TM's normal, nose and mouth without ulcers or lesions  NECK: no adenopathy, no asymmetry, masses, or scars and thyroid normal to palpation  RESP: lungs clear to auscultation - no rales, rhonchi or wheezes  CV: regular rate and rhythm, normal S1 S2, no S3 or S4, no murmur, click or rub, no peripheral edema and peripheral pulses strong  ABDOMEN: soft, nontender, no hepatosplenomegaly, no masses and bowel sounds normal  MS: no gross musculoskeletal defects noted, no edema  SKIN: no suspicious lesions or rashes  Diabetic foot exam: normal DP and PT pulses, no trophic changes or ulcerative lesions, normal sensory exam and normal monofilament exam      Prior labs reviewed with patient.  Care everywhere as well.                Lung Cancer Screening Shared Decision Making Visit     Ry Man, a 57 year old male, is eligible for lung cancer screening    History   Smoking Status     Former     Packs/day: 1.00     Years: 37.00     Types: Cigarettes     Start date: 1/1/1980     Quit date: 2/1/2020   Smokeless  Tobacco     Never       I have discussed with patient the risks and benefits of screening for lung cancer with low-dose CT.     The risks include:    radiation exposure: one low dose chest CT has as much ionizing radiation as about 15 chest x-rays, or 6 months of background radiation living in [place]      false positives: most findings/nodules are NOT cancer, but some might still require additional diagnostic evaluation, including biopsy    over-diagnosis: some slow growing cancers that might never have been clinically significant will be detected and treated unnecessarily     The benefit of early detection of lung cancer is contingent upon adherence to annual screening or more frequent follow up if indicated.     Furthermore, to benefit from screening, Ry must be willing and able to undergo diagnostic procedures, if indicated. Although no specific guide is available for determining severity of comorbidities, it is reasonable to withhold screening in patients who have greater mortality risk from other diseases.     We did discuss that the best way to prevent lung cancer is to not smoke.    Some patients may value a numeric estimation of lung cancer risk when evaluating if lung cancer screening is right for them, here is one calculator:    ShouldIScreen

## 2023-05-24 ENCOUNTER — OFFICE VISIT (OUTPATIENT)
Dept: FAMILY MEDICINE | Facility: OTHER | Age: 58
End: 2023-05-24
Attending: FAMILY MEDICINE
Payer: COMMERCIAL

## 2023-05-24 VITALS
SYSTOLIC BLOOD PRESSURE: 160 MMHG | HEIGHT: 72 IN | WEIGHT: 220.7 LBS | OXYGEN SATURATION: 96 % | DIASTOLIC BLOOD PRESSURE: 100 MMHG | TEMPERATURE: 98 F | BODY MASS INDEX: 29.89 KG/M2 | HEART RATE: 64 BPM

## 2023-05-24 DIAGNOSIS — I10 ESSENTIAL HYPERTENSION: ICD-10-CM

## 2023-05-24 DIAGNOSIS — E11.9 TYPE 2 DIABETES MELLITUS WITHOUT COMPLICATION, WITHOUT LONG-TERM CURRENT USE OF INSULIN (H): ICD-10-CM

## 2023-05-24 DIAGNOSIS — J44.9 STAGE 3 SEVERE COPD BY GOLD CLASSIFICATION (H): ICD-10-CM

## 2023-05-24 DIAGNOSIS — E78.5 HYPERLIPIDEMIA, UNSPECIFIED HYPERLIPIDEMIA TYPE: ICD-10-CM

## 2023-05-24 DIAGNOSIS — Z00.00 ENCOUNTER FOR MEDICAL EXAMINATION TO ESTABLISH CARE: Primary | ICD-10-CM

## 2023-05-24 DIAGNOSIS — Z87.891 PERSONAL HISTORY OF TOBACCO USE: ICD-10-CM

## 2023-05-24 PROCEDURE — G0296 VISIT TO DETERM LDCT ELIG: HCPCS | Performed by: FAMILY MEDICINE

## 2023-05-24 PROCEDURE — 99215 OFFICE O/P EST HI 40 MIN: CPT | Performed by: FAMILY MEDICINE

## 2023-05-24 RX ORDER — CARVEDILOL 6.25 MG/1
6.25 TABLET ORAL 2 TIMES DAILY WITH MEALS
Qty: 60 TABLET | Refills: 1 | Status: SHIPPED | OUTPATIENT
Start: 2023-05-24 | End: 2023-05-26

## 2023-05-24 RX ORDER — ROSUVASTATIN CALCIUM 20 MG/1
20 TABLET, COATED ORAL DAILY
Qty: 90 TABLET | Refills: 3 | Status: SHIPPED | OUTPATIENT
Start: 2023-05-24 | End: 2023-05-26

## 2023-05-24 ASSESSMENT — PAIN SCALES - GENERAL: PAINLEVEL: NO PAIN (0)

## 2023-05-24 NOTE — TELEPHONE ENCOUNTER
Notify patient -   After our visit - completed chart review further.    1. [place] pharmacy consult placed.  2. Diabetes Center referral placed.  3. Diabetic eye referral placed.  4. Echocardiogram ordered- last done 2020 - to reassess heart in setting of uncontrolled high blood pressure.  5. Stop Metoprolol and start Carvedilol (coreg) - 6.25 mg BID.  New script sent.  Better type of beta blocker for BP control.  Please have him call with BP readings after 1 week, sooner if concerns.  6. Lung CT ordered for lung cancer screening.  7. Crestor 20 mg nightly for lipids - script sent.

## 2023-05-24 NOTE — PATIENT INSTRUCTIONS
Start Crestor 20 mg daily for lipids.  Stop Metoprolol.  Start Carvedilol 6.25 mg twice daily for BP.  Follow up 1 month.  Call diabetes center to schedule.  Eye referral placed.  Lung CT ordered.      Lung Cancer Screening   Frequently Asked Questions  If you are at high-risk for lung cancer, getting screened with low-dose computed tomography (LDCT) every year can help save your life. This handout offers answers to some of the most common questions about lung cancer screening. If you have other questions, please call [phone] ([phone]).     What is it?  Lung cancer screening uses special X-ray technology to create an image of your lung tissue. The exam is quick and easy and takes less than 10 seconds. We don t give you any medicine or use any needles. You can eat before and after the exam. You don t need to change your clothes as long as the clothing on your chest doesn t contain metal. But, you do need to be able to hold your breath for at least 6 seconds during the exam.    What is the goal of lung cancer screening?  The goal of lung cancer screening is to save lives. Many times, lung cancer is not found until a person starts having physical symptoms. Lung cancer screening can help detect lung cancer in the earliest stages when it may be easier to treat.    Who should be screened for lung cancer?  We suggest lung cancer screening for anyone who is at high-risk for lung cancer. You are in the high-risk group if you:     are between the ages of 55 and 79, and   have smoked at least 1 pack of cigarettes a day for 20 or more years, and   still smoke or have quit within the past 15 years.    However, if you have a new cough or shortness of breath, you should talk to your doctor before being screened.    Why does it matter if I have symptoms?  Certain symptoms can be a sign that you have a condition in your lungs that should be checked and treated by your doctor. These symptoms include fever, chest  pain, a new or changing cough, shortness of breath that you have never felt before, coughing up blood or unexplained weight loss. Having any of these symptoms can greatly affect the results of lung cancer screening.       Should all smokers get an LDCT lung cancer screening exam?  It depends. Lung cancer screening is for a very specific group of men and women who have a history of heavy smoking over a long period of time (see  Who should be screened for lung cancer  above).  I am in the high-risk group, but have been diagnosed with cancer in the past. Is LDCT lung cancer screening right for me?  In some cases, you should not have LDCT lung screening, such as when your doctor is already following your cancer with CT scan studies. Your doctor will help you decide if LDCT lung screening is right for you.  Do I need to have a screening exam every year?  Yes. If you are in the high-risk group described earlier, you should get an LDCT lung cancer screening exam every year until you are 79, or are no longer willing or able to undergo screening and possible procedures to diagnose and treat lung cancer.  How effective is LDCT at preventing death from lung cancer?  Studies have shown that LDCT lung cancer screening can lower the risk of death from lung cancer by 20 percent in people who are at high-risk.  What are the risks?  There are some risks and limitations of LDCT lung cancer screening. We want to make sure you understand the risks and benefits, so please let us know if you have any questions. Your doctor may want to talk with you more about these risks.   Radiation exposure: As with any exam that uses radiation, there is a very small increased risk of cancer. The amount of radiation in LDCT is small--about the same amount a person would get from a mammogram. Your doctor orders the exam when he or she feels the potential benefits outweigh the risks.   False negatives: No test is perfect, including LDCT. It is possible  that you may have a medical condition, including lung cancer, that is not found during your exam. This is called a false negative result.   False positives and more testing: LDCT very often finds something in the lung that could be cancer, but in fact is not. This is called a false positive result. False positive tests often cause anxiety. To make sure these findings are not cancer, you may need to have more tests. These tests will be done only if you give us permission. Sometimes patients need a treatment that can have side effects, such as a biopsy. For more information on false positives, see  What can I expect from the results?    Findings not related to lung cancer: Your LDCT exam also takes pictures of areas of your body next to your lungs. In a very small number of cases, the CT scan will show an abnormal finding in one of these areas, such as your kidneys, adrenal glands, liver or thyroid. This finding may not be serious, but you may need more tests. Your doctor can help you decide what other tests you may need, if any.  What can I expect from the results?  About 1 out of 4 LDCT exams will find something that may need more tests. Most of the time, these findings are lung nodules. Lung nodules are very small collections of tissue in the lung. These nodules are very common, and the vast majority--more than 97 percent--are not cancer (benign). Most are normal lymph nodes or small areas of scarring from past infections.  But, if a small lung nodule is found to be cancer, the cancer can be cured more than 90 percent of the time. To know if the nodule is cancer, we may need to get more images before your next yearly screening exam. If the nodule has suspicious features (for example, it is large, has an odd shape or grows over time), we will refer you to a specialist for further testing.  Will my doctor also get the results?  Yes. Your doctor will get a copy of your results.  Is it okay to keep smoking now that  there s a cancer screening exam?  No. Tobacco is one of the strongest cancer-causing agents. It causes not only lung cancer, but other cancers and cardiovascular (heart) diseases as well. The damage caused by smoking builds over time. This means that the longer you smoke, the higher your risk of disease. While it is never too late to quit, the sooner you quit, the better.  Where can I find help to quit smoking?  The best way to prevent lung cancer is to stop smoking. If you have already quit smoking, congratulations and keep it up! For help on quitting smoking, please call [employer] at 4-934-QUIT-NOW (1-823.753.7883) or the American [employer] Society at 1-921.221.6948 to find local resources near you.  One-on-one health coaching:  If you d prefer to work individually with a health care provider on tobacco cessation, we offer:     Medication Therapy Management:  Our specially trained pharmacists work closely with you and your doctor to help you quit smoking.  Call [phone] or [phone] (toll free).

## 2023-05-25 ENCOUNTER — TELEPHONE (OUTPATIENT)
Dept: EDUCATION SERVICES | Facility: HOSPITAL | Age: 58
End: 2023-05-25

## 2023-05-26 ENCOUNTER — TELEPHONE (OUTPATIENT)
Dept: FAMILY MEDICINE | Facility: OTHER | Age: 58
End: 2023-05-26

## 2023-05-26 RX ORDER — CARVEDILOL 6.25 MG/1
6.25 TABLET ORAL 2 TIMES DAILY WITH MEALS
Qty: 60 TABLET | Refills: 1 | Status: SHIPPED | OUTPATIENT
Start: 2023-05-26 | End: 2023-06-30

## 2023-05-26 RX ORDER — ROSUVASTATIN CALCIUM 20 MG/1
20 TABLET, COATED ORAL DAILY
Qty: 90 TABLET | Refills: 3 | Status: SHIPPED | OUTPATIENT
Start: 2023-05-26 | End: 2023-06-30 | Stop reason: SINTOL

## 2023-05-26 NOTE — TELEPHONE ENCOUNTER
MTM referral from: [facility] visit (referral by provider)    MT referral outreach attempt #2 on May 26, 2023 at 11:10 AM      Outcome: Patient not reachable after several attempts, will route to [facility] Pharmacist/Provider as an FYI.  [facility] scheduling number is [phone].  Thank you for the referral.    Use [employer] OOS for the carrier/Plan on the flowsheet    Tavia Roberts [facility]

## 2023-06-29 NOTE — PROGRESS NOTES
Assessment & Plan     Type 2 diabetes mellitus without complication, without long-term current use of insulin (H)  Upcoming appointment with dietician and nurse.  Then will start checking sugars.  Continue Metformin.  Too soon for a1 - scheduled prior to leaving.  Foot exam completed.  He will schedule eye exam.    Hyperlipidemia, unspecified hyperlipidemia type  Intolerant to statins.  Start Zetia.  Recheck lab in 1 month with a1c.  - ezetimibe (ZETIA) 10 MG tablet; Take 1 tablet (10 mg) by mouth daily  - Lipid Profile (Chol, Trig, HDL, LDL calc); Future    Essential hypertension  Not at goal in clinic, but home readings much improved.  Mild bradycardia, asymptomatic.  Did not sleep last night - may play a role.  Continue current regimen.  Nurse BP check 1 month with lab visit.  - carvedilol (COREG) 6.25 MG tablet; Take 1 tablet (6.25 mg) by mouth 2 times daily (with meals)    Tobacco use disorder  former      Stage 3 severe COPD by GOLD classification (H)  Managed by pulmonology.       Patient Instructions   Schedule -   Eye exam  Lung CT  Diabetes nurse/dietician.    Continue Coreg/Carvedilol.  Monitor BP and pulse.  BP goal <130/80.  If pulse running <40 - let use know.    Stop Crestor.  Start Zetia for lipids.    Lab 1 month.  Fasting.        Return in about 1 month (around 7/30/2023) for fasting lab, BP Recheck nurse only.    Ana Stark MD  [facility] - RADHA Raza is a 57 year old, presenting for the following health issues:  Hypertension and Diabetes        5/2/2023    12:48 PM   Additional Questions   Roomed by Cain Deutsch LPN   Accompanied by Self     HPI     Tobacco - quit 2020  [facility]    Colon screen - C[facility]uard [facility]    Diabetes Follow-up    How often are you checking your blood sugar? Not at all    What concerns do you have today about your diabetes? None     Do you have any of these symptoms? (Select all that apply)  Weight  loss    Have you had a diabetic eye exam in the last 12 months? No     Foot exam - due    Eye exam - will schedule    Metformin restarted; appetite down; down 5 pounds    Walking daily; tracks steps - variable 2000 - 14,000       Hyperlipidemia Follow-Up - lipid panel 5/2023 - resumed statin - had to stop due to severe muscle aches    Are you regularly taking any medication or supplement to lower your cholesterol?   No    Are you having muscle aches or other side effects that you think could be caused by your cholesterol lowering medication?  Yes- Swelling in joints so bad he could not walk     Hypertension Follow-up - changed metoprolol to Coreg 6.25 BID; home BP monitor -     Do you check your blood pressure regularly outside of the clinic? Yes     Are you following a low salt diet? Yes    Are your blood pressures ever more than 140 on the top number (systolic) OR more   than 90 on the bottom number (diastolic), for example 140/90? No   Also on hydrochlorothiazide, norvasc, clonidine  Intolerant to ACE/ARB  Home readings -  120-130s/70s-80s average at home - brings in several readins  High 147/83.  Low 109/77  Pulse can be 40s, 50s.    BP Readings from Last 2 Encounters:   06/30/23 (!) 134/96   05/24/23 (!) 160/100     Hemoglobin A1C (%)   Date Value   04/11/2023 7.5 (H)   03/13/2020 6.4 (H)   02/16/2020 6.4 (H)     LDL Cholesterol Calculated (mg/dL)   Date Value   05/02/2023 194 (H)   03/13/2020 86   05/07/2013 129       COPD Follow-Up - Dr Sood - pulmonology    Overall, how are your COPD symptoms since your last clinic visit?  No change    How much fatigue or shortness of breath do you have when you are walking?  Same as usual    How much shortness of breath do you have when you are resting?  None    How often do you cough? Often    Have you noticed any change in your sputum/phlegm?  Yes- Patient was coughing up blood about 2 weeks ago for about 4-5 days     Have you experienced a recent fever? Yes    Please  describe how far you can walk without stopping to rest:  Greater than 2 miles    How many flights of stairs are you able to walk up without stopping?  1    Have you had any Emergency Room Visits, Urgent Care Visits, or Hospital Admissions because of your COPD since your last office visit?  No    History   Smoking Status     Former     Packs/day: 1.00     Years: 37.00     Types: Cigarettes     Start date: 1/1/1980     Quit date: 2/1/2020   Smokeless Tobacco     Never     No results found for: FEV1, BQL8DQN      Review of Systems   Constitutional, HEENT, cardiovascular, pulmonary, gi and gu systems are negative, except as otherwise noted.      Objective    BP (!) 134/96 (BP Location: Left arm, Patient Position: Chair, Cuff Size: Adult Large)   Pulse 61   Temp 97.7  F (36.5  C) (Tympanic)   Wt 97.9 kg (215 lb 12.8 oz)   SpO2 95%   BMI 29.27 kg/m    Body mass index is 29.27 kg/m .  Physical Exam   GENERAL: healthy, alert and no distress  EYES: Eyes grossly normal to inspection, PERRL and conjunctivae and sclerae normal  NECK: no adenopathy, no asymmetry, masses, or scars and thyroid normal to palpation  RESP: lungs clear to auscultation - no rales, rhonchi or wheezes  CV: regular rate and rhythm, normal S1 S2, no S3 or S4, no murmur, click or rub, no peripheral edema and peripheral pulses strong  ABDOMEN: soft, nontender, no hepatosplenomegaly, no masses and bowel sounds normal  MS: no gross musculoskeletal defects noted, no edema  SKIN: lower legs and feet with kaitlyn brown deposits from venous insufficiency  Diabetic foot exam: normal DP and PT pulses, no trophic changes or ulcerative lesions, normal sensory exam and normal monofilament exam    Future labs

## 2023-06-30 ENCOUNTER — OFFICE VISIT (OUTPATIENT)
Dept: FAMILY MEDICINE | Facility: OTHER | Age: 58
End: 2023-06-30
Attending: FAMILY MEDICINE
Payer: COMMERCIAL

## 2023-06-30 VITALS
SYSTOLIC BLOOD PRESSURE: 134 MMHG | HEART RATE: 61 BPM | WEIGHT: 215.8 LBS | BODY MASS INDEX: 29.27 KG/M2 | TEMPERATURE: 97.7 F | DIASTOLIC BLOOD PRESSURE: 96 MMHG | OXYGEN SATURATION: 95 %

## 2023-06-30 DIAGNOSIS — J44.9 STAGE 3 SEVERE COPD BY GOLD CLASSIFICATION (H): ICD-10-CM

## 2023-06-30 DIAGNOSIS — E78.5 HYPERLIPIDEMIA, UNSPECIFIED HYPERLIPIDEMIA TYPE: ICD-10-CM

## 2023-06-30 DIAGNOSIS — F17.200 TOBACCO USE DISORDER: ICD-10-CM

## 2023-06-30 DIAGNOSIS — E11.9 TYPE 2 DIABETES MELLITUS WITHOUT COMPLICATION, WITHOUT LONG-TERM CURRENT USE OF INSULIN (H): Primary | ICD-10-CM

## 2023-06-30 DIAGNOSIS — I10 ESSENTIAL HYPERTENSION: ICD-10-CM

## 2023-06-30 PROBLEM — J10.1 INFLUENZA A: Status: RESOLVED | Noted: 2020-02-15 | Resolved: 2023-06-30

## 2023-06-30 PROBLEM — J96.01 ACUTE RESPIRATORY FAILURE WITH HYPOXIA (H): Status: RESOLVED | Noted: 2020-02-15 | Resolved: 2023-06-30

## 2023-06-30 PROCEDURE — 99214 OFFICE O/P EST MOD 30 MIN: CPT | Performed by: FAMILY MEDICINE

## 2023-06-30 RX ORDER — EZETIMIBE 10 MG/1
10 TABLET ORAL DAILY
Qty: 90 TABLET | Refills: 3 | Status: SHIPPED | OUTPATIENT
Start: 2023-06-30 | End: 2024-07-18

## 2023-06-30 RX ORDER — CARVEDILOL 6.25 MG/1
6.25 TABLET ORAL 2 TIMES DAILY WITH MEALS
Qty: 180 TABLET | Refills: 1 | Status: SHIPPED | OUTPATIENT
Start: 2023-06-30 | End: 2023-07-19

## 2023-06-30 ASSESSMENT — PAIN SCALES - GENERAL: PAINLEVEL: SEVERE PAIN (6)

## 2023-06-30 NOTE — PATIENT INSTRUCTIONS
Schedule -   Eye exam  Lung CT  Diabetes nurse/dietician.    Continue Coreg/Carvedilol.  Monitor BP and pulse.  BP goal <130/80.  If pulse running <40 - let use know.    Stop Crestor.  Start Zetia for lipids.    Lab 1 month.  Fasting.

## 2023-07-18 DIAGNOSIS — I10 ESSENTIAL HYPERTENSION: ICD-10-CM

## 2023-07-19 ENCOUNTER — TELEPHONE (OUTPATIENT)
Dept: FAMILY MEDICINE | Facility: OTHER | Age: 58
End: 2023-07-19

## 2023-07-19 RX ORDER — CARVEDILOL 6.25 MG/1
TABLET ORAL
Qty: 180 TABLET | Refills: 0 | Status: SHIPPED | OUTPATIENT
Start: 2023-07-19 | End: 2024-07-18

## 2023-07-19 RX ORDER — AMLODIPINE BESYLATE 10 MG/1
10 TABLET ORAL DAILY
Qty: 90 TABLET | Refills: 0 | Status: SHIPPED | OUTPATIENT
Start: 2023-07-19

## 2023-07-19 NOTE — TELEPHONE ENCOUNTER
Working with patient [facility] through the [facility]managed care program.  [facility] is calling the [facility] today for a list of active medications.  Will call writer back to reconcile with [facility] med list to determine which medications need to be sent with visits notes to [facility]     Patient returned call this a.m.  States speaking with the [facility] pharmacy & verified meds were reviewed & approved by the [facility] Provider.  Relayed [facility] update is medications will be mailed to Pt today.    Writer did submit carvedilol & amlodipine to local RX to avoid patient running out of medication    Writer instructed to call back to the clinic after [facility] meds are received if Pt still has med discrepancies.

## 2023-08-08 DIAGNOSIS — E11.9 TYPE 2 DIABETES MELLITUS WITHOUT COMPLICATION, WITHOUT LONG-TERM CURRENT USE OF INSULIN (H): Primary | ICD-10-CM

## 2023-08-10 ENCOUNTER — ALLIED HEALTH/NURSE VISIT (OUTPATIENT)
Dept: FAMILY MEDICINE | Facility: OTHER | Age: 58
End: 2023-08-10
Attending: FAMILY MEDICINE
Payer: COMMERCIAL

## 2023-08-10 ENCOUNTER — LAB (OUTPATIENT)
Dept: LAB | Facility: OTHER | Age: 58
End: 2023-08-10
Attending: FAMILY MEDICINE
Payer: COMMERCIAL

## 2023-08-10 VITALS — DIASTOLIC BLOOD PRESSURE: 82 MMHG | SYSTOLIC BLOOD PRESSURE: 132 MMHG

## 2023-08-10 DIAGNOSIS — E78.5 HYPERLIPIDEMIA, UNSPECIFIED HYPERLIPIDEMIA TYPE: ICD-10-CM

## 2023-08-10 DIAGNOSIS — E11.9 TYPE 2 DIABETES MELLITUS WITHOUT COMPLICATION, WITHOUT LONG-TERM CURRENT USE OF INSULIN (H): ICD-10-CM

## 2023-08-10 DIAGNOSIS — I10 ESSENTIAL HYPERTENSION: Primary | ICD-10-CM

## 2023-08-10 LAB
ALBUMIN SERPL BCG-MCNC: 4.4 G/DL (ref 3.5–5.2)
ALP SERPL-CCNC: 86 U/L (ref 40–129)
ALT SERPL W P-5'-P-CCNC: 27 U/L (ref 0–70)
ANION GAP SERPL CALCULATED.3IONS-SCNC: 11 MMOL/L (ref 7–15)
AST SERPL W P-5'-P-CCNC: 21 U/L (ref 0–45)
BILIRUB SERPL-MCNC: 0.5 MG/DL
BUN SERPL-MCNC: 18.7 MG/DL (ref 8–23)
CALCIUM SERPL-MCNC: 9.7 MG/DL (ref 8.8–10.2)
CHLORIDE SERPL-SCNC: 101 MMOL/L (ref 98–107)
CHOLEST SERPL-MCNC: 270 MG/DL
CREAT SERPL-MCNC: 0.89 MG/DL (ref 0.67–1.17)
CREAT UR-MCNC: 96.2 MG/DL
DEPRECATED HCO3 PLAS-SCNC: 26 MMOL/L (ref 22–29)
EST. AVERAGE GLUCOSE BLD GHB EST-MCNC: 148 MG/DL
GFR SERPL CREATININE-BSD FRML MDRD: >90 ML/MIN/1.73M2
GLUCOSE SERPL-MCNC: 117 MG/DL (ref 70–99)
HBA1C MFR BLD: 6.8 %
HDLC SERPL-MCNC: 54 MG/DL
LDLC SERPL CALC-MCNC: 186 MG/DL
MICROALBUMIN UR-MCNC: 25 MG/L
MICROALBUMIN/CREAT UR: 25.99 MG/G CR (ref 0–17)
NONHDLC SERPL-MCNC: 216 MG/DL
POTASSIUM SERPL-SCNC: 3.8 MMOL/L (ref 3.4–5.3)
PROT SERPL-MCNC: 7.6 G/DL (ref 6.4–8.3)
SODIUM SERPL-SCNC: 138 MMOL/L (ref 136–145)
TRIGL SERPL-MCNC: 152 MG/DL

## 2023-08-10 PROCEDURE — 80053 COMPREHEN METABOLIC PANEL: CPT

## 2023-08-10 PROCEDURE — 80061 LIPID PANEL: CPT

## 2023-08-10 PROCEDURE — 99207 PR NO CHARGE NURSE ONLY: CPT

## 2023-08-10 PROCEDURE — 36415 COLL VENOUS BLD VENIPUNCTURE: CPT

## 2023-08-10 PROCEDURE — 82570 ASSAY OF URINE CREATININE: CPT

## 2023-08-10 PROCEDURE — 83036 HEMOGLOBIN GLYCOSYLATED A1C: CPT

## 2023-08-10 PROCEDURE — 82043 UR ALBUMIN QUANTITATIVE: CPT

## 2023-08-10 NOTE — PROGRESS NOTES
Patient in for BP check. BP was taken in left arm, adult large cuff, sitting. BP was 132/90.  Retook BP 5 minutes later. Taken in left arm, adult large cufff, sitting. BP was 132/82. Patient tolerated well.

## 2024-06-03 ENCOUNTER — TELEPHONE (OUTPATIENT)
Dept: FAMILY MEDICINE | Facility: OTHER | Age: 59
End: 2024-06-03

## 2024-06-12 ENCOUNTER — APPOINTMENT (OUTPATIENT)
Dept: ULTRASOUND IMAGING | Facility: HOSPITAL | Age: 59
End: 2024-06-12
Attending: PHYSICIAN ASSISTANT
Payer: COMMERCIAL

## 2024-06-12 ENCOUNTER — APPOINTMENT (OUTPATIENT)
Dept: GENERAL RADIOLOGY | Facility: HOSPITAL | Age: 59
End: 2024-06-12
Attending: PHYSICIAN ASSISTANT
Payer: COMMERCIAL

## 2024-06-12 ENCOUNTER — HOSPITAL ENCOUNTER (EMERGENCY)
Facility: HOSPITAL | Age: 59
Discharge: HOME | End: 2024-06-12
Attending: PHYSICIAN ASSISTANT | Admitting: PHYSICIAN ASSISTANT
Payer: COMMERCIAL

## 2024-06-12 ENCOUNTER — TELEPHONE (OUTPATIENT)
Dept: FAMILY MEDICINE | Facility: OTHER | Age: 59
End: 2024-06-12

## 2024-06-12 ENCOUNTER — APPOINTMENT (OUTPATIENT)
Dept: CT IMAGING | Facility: HOSPITAL | Age: 59
End: 2024-06-12
Attending: PHYSICIAN ASSISTANT
Payer: COMMERCIAL

## 2024-06-12 VITALS
TEMPERATURE: 98 F | OXYGEN SATURATION: 93 % | HEART RATE: 70 BPM | DIASTOLIC BLOOD PRESSURE: 99 MMHG | HEIGHT: 73 IN | RESPIRATION RATE: 16 BRPM | BODY MASS INDEX: 25.31 KG/M2 | SYSTOLIC BLOOD PRESSURE: 154 MMHG | WEIGHT: 191 LBS

## 2024-06-12 DIAGNOSIS — J44.1 CHRONIC OBSTRUCTIVE PULMONARY DISEASE WITH ACUTE EXACERBATION (H): ICD-10-CM

## 2024-06-12 DIAGNOSIS — R06.02 SOB (SHORTNESS OF BREATH): ICD-10-CM

## 2024-06-12 DIAGNOSIS — J44.9 STAGE 3 SEVERE COPD BY GOLD CLASSIFICATION (H): ICD-10-CM

## 2024-06-12 DIAGNOSIS — I71.43 INFRARENAL ABDOMINAL AORTIC ANEURYSM (AAA) WITHOUT RUPTURE (H): Primary | ICD-10-CM

## 2024-06-12 DIAGNOSIS — I71.43 INFRARENAL ABDOMINAL AORTIC ANEURYSM (AAA) WITHOUT RUPTURE (H): ICD-10-CM

## 2024-06-12 LAB
ANION GAP SERPL CALCULATED.3IONS-SCNC: 11 MMOL/L (ref 7–15)
BASOPHILS # BLD AUTO: 0 10E3/UL (ref 0–0.2)
BASOPHILS NFR BLD AUTO: 0 %
BUN SERPL-MCNC: 22.9 MG/DL (ref 8–23)
CALCIUM SERPL-MCNC: 9.7 MG/DL (ref 8.8–10.2)
CHLORIDE SERPL-SCNC: 101 MMOL/L (ref 98–107)
CREAT SERPL-MCNC: 0.83 MG/DL (ref 0.67–1.17)
D DIMER PPP FEU-MCNC: 1.52 UG/ML FEU (ref 0–0.5)
DEPRECATED HCO3 PLAS-SCNC: 25 MMOL/L (ref 22–29)
EGFRCR SERPLBLD CKD-EPI 2021: >90 ML/MIN/1.73M2
EOSINOPHIL # BLD AUTO: 0.4 10E3/UL (ref 0–0.7)
EOSINOPHIL NFR BLD AUTO: 5 %
ERYTHROCYTE [DISTWIDTH] IN BLOOD BY AUTOMATED COUNT: 13.1 % (ref 10–15)
GLUCOSE SERPL-MCNC: 129 MG/DL (ref 70–99)
HCT VFR BLD AUTO: 46.1 % (ref 40–53)
HGB BLD-MCNC: 15.4 G/DL (ref 13.3–17.7)
HOLD SPECIMEN: NORMAL
HOLD SPECIMEN: NORMAL
IMM GRANULOCYTES # BLD: 0 10E3/UL
IMM GRANULOCYTES NFR BLD: 0 %
LYMPHOCYTES # BLD AUTO: 1.6 10E3/UL (ref 0.8–5.3)
LYMPHOCYTES NFR BLD AUTO: 19 %
MCH RBC QN AUTO: 30.9 PG (ref 26.5–33)
MCHC RBC AUTO-ENTMCNC: 33.4 G/DL (ref 31.5–36.5)
MCV RBC AUTO: 92 FL (ref 78–100)
MONOCYTES # BLD AUTO: 0.5 10E3/UL (ref 0–1.3)
MONOCYTES NFR BLD AUTO: 6 %
NEUTROPHILS # BLD AUTO: 5.8 10E3/UL (ref 1.6–8.3)
NEUTROPHILS NFR BLD AUTO: 69 %
NRBC # BLD AUTO: 0 10E3/UL
NRBC BLD AUTO-RTO: 0 /100
NT-PROBNP SERPL-MCNC: <36 PG/ML (ref 0–900)
PLATELET # BLD AUTO: 201 10E3/UL (ref 150–450)
POTASSIUM SERPL-SCNC: 4.2 MMOL/L (ref 3.4–5.3)
RBC # BLD AUTO: 4.99 10E6/UL (ref 4.4–5.9)
SODIUM SERPL-SCNC: 137 MMOL/L (ref 135–145)
TROPONIN T SERPL HS-MCNC: 10 NG/L
WBC # BLD AUTO: 8.5 10E3/UL (ref 4–11)

## 2024-06-12 PROCEDURE — 99285 EMERGENCY DEPT VISIT HI MDM: CPT | Mod: 25

## 2024-06-12 PROCEDURE — 93005 ELECTROCARDIOGRAM TRACING: CPT

## 2024-06-12 PROCEDURE — 84484 ASSAY OF TROPONIN QUANT: CPT | Performed by: PHYSICIAN ASSISTANT

## 2024-06-12 PROCEDURE — 93010 ELECTROCARDIOGRAM REPORT: CPT | Performed by: INTERNAL MEDICINE

## 2024-06-12 PROCEDURE — 36415 COLL VENOUS BLD VENIPUNCTURE: CPT | Performed by: PHYSICIAN ASSISTANT

## 2024-06-12 PROCEDURE — 85025 COMPLETE CBC W/AUTO DIFF WBC: CPT | Performed by: PHYSICIAN ASSISTANT

## 2024-06-12 PROCEDURE — 94640 AIRWAY INHALATION TREATMENT: CPT

## 2024-06-12 PROCEDURE — 250N000009 HC RX 250: Performed by: PHYSICIAN ASSISTANT

## 2024-06-12 PROCEDURE — 71275 CT ANGIOGRAPHY CHEST: CPT

## 2024-06-12 PROCEDURE — 80048 BASIC METABOLIC PNL TOTAL CA: CPT | Performed by: PHYSICIAN ASSISTANT

## 2024-06-12 PROCEDURE — 250N000012 HC RX MED GY IP 250 OP 636 PS 637: Performed by: PHYSICIAN ASSISTANT

## 2024-06-12 PROCEDURE — 99284 EMERGENCY DEPT VISIT MOD MDM: CPT | Performed by: PHYSICIAN ASSISTANT

## 2024-06-12 PROCEDURE — 76775 US EXAM ABDO BACK WALL LIM: CPT

## 2024-06-12 PROCEDURE — 71046 X-RAY EXAM CHEST 2 VIEWS: CPT

## 2024-06-12 PROCEDURE — 83880 ASSAY OF NATRIURETIC PEPTIDE: CPT | Performed by: PHYSICIAN ASSISTANT

## 2024-06-12 PROCEDURE — 250N000011 HC RX IP 250 OP 636: Performed by: RADIOLOGY

## 2024-06-12 PROCEDURE — 85379 FIBRIN DEGRADATION QUANT: CPT | Performed by: PHYSICIAN ASSISTANT

## 2024-06-12 RX ORDER — DOXYCYCLINE 100 MG/1
100 CAPSULE ORAL 2 TIMES DAILY
Qty: 14 CAPSULE | Refills: 0 | Status: SHIPPED | OUTPATIENT
Start: 2024-06-12 | End: 2024-06-19

## 2024-06-12 RX ORDER — IOPAMIDOL 755 MG/ML
65 INJECTION, SOLUTION INTRAVASCULAR ONCE
Status: COMPLETED | OUTPATIENT
Start: 2024-06-12 | End: 2024-06-12

## 2024-06-12 RX ORDER — PREDNISONE 20 MG/1
60 TABLET ORAL ONCE
Status: COMPLETED | OUTPATIENT
Start: 2024-06-12 | End: 2024-06-12

## 2024-06-12 RX ORDER — PREDNISONE 10 MG/1
TABLET ORAL
Qty: 32 TABLET | Refills: 0 | Status: SHIPPED | OUTPATIENT
Start: 2024-06-12 | End: 2024-06-22

## 2024-06-12 RX ORDER — ALBUTEROL SULFATE 0.83 MG/ML
2.5 SOLUTION RESPIRATORY (INHALATION) ONCE
Status: COMPLETED | OUTPATIENT
Start: 2024-06-12 | End: 2024-06-12

## 2024-06-12 RX ADMIN — IOPAMIDOL 65 ML: 755 INJECTION, SOLUTION INTRAVENOUS at 12:40

## 2024-06-12 RX ADMIN — ALBUTEROL SULFATE 2.5 MG: 2.5 SOLUTION RESPIRATORY (INHALATION) at 11:31

## 2024-06-12 RX ADMIN — PREDNISONE 60 MG: 20 TABLET ORAL at 12:14

## 2024-06-12 ASSESSMENT — ENCOUNTER SYMPTOMS
APPETITE CHANGE: 0
FEVER: 0
SHORTNESS OF BREATH: 1
COUGH: 1
FATIGUE: 0
ABDOMINAL PAIN: 0
BACK PAIN: 0
WHEEZING: 1
ACTIVITY CHANGE: 0
PALPITATIONS: 0

## 2024-06-12 ASSESSMENT — COLUMBIA-SUICIDE SEVERITY RATING SCALE - C-SSRS
2. HAVE YOU ACTUALLY HAD ANY THOUGHTS OF KILLING YOURSELF IN THE PAST MONTH?: NO
6. HAVE YOU EVER DONE ANYTHING, STARTED TO DO ANYTHING, OR PREPARED TO DO ANYTHING TO END YOUR LIFE?: NO
1. IN THE PAST MONTH, HAVE YOU WISHED YOU WERE DEAD OR WISHED YOU COULD GO TO SLEEP AND NOT WAKE UP?: NO

## 2024-06-12 ASSESSMENT — ACTIVITIES OF DAILY LIVING (ADL)
ADLS_ACUITY_SCORE: 38

## 2024-06-12 NOTE — ED TRIAGE NOTES
Patient here with  [facility] for shortness of breath. States that his shortness of breath started Monday and has just gotten worse. Patient has history of COPD and blood clots in his lungs.

## 2024-06-12 NOTE — DISCHARGE INSTRUCTIONS
Continue prednisone tomorrow as prescribed.    Start doxycycline today.    Follow-up in the clinic for recheck and to discuss the abdominal aortic aneurysm.  This will likely need to be seen by vascular surgery at some point.    Please return to this emergency department for any new or worsening symptoms.

## 2024-06-12 NOTE — ED PROVIDER NOTES
"  History     Chief Complaint   Patient presents with    Shortness of Breath     The history is provided by the patient.     Ry Man is a 58 year old male who presented to the emergency department via EMS for evaluation of shortness of breath.  Worsening over the last few days.  He has known severe COPD.  The COPD significantly worsened after darrin COVID-19 in 2020.  He does not smoke.  He does have a history of pulmonary embolism.  Reports significant wheezing and coughing without any production.  He does see pulmonary.  No fevers.  No hemoptysis.  No chest pain.    Allergies:  Allergies   Allergen Reactions    Homeopathic Products      \"Ethyl Alcohol\"    Lisinopril Other (See Comments)     Cough      Valsartan Rash and GI Disturbance     Diovan       Problem List:    Patient Active Problem List    Diagnosis Date Noted    Stage 3 severe COPD by GOLD classification (H) 05/24/2023     Priority: Medium    Diabetes mellitus, type 2 (H) 05/02/2023     Priority: Medium    Hyperlipidemia 03/12/2020     Priority: Medium    Acute respiratory failure with hypoxia and hypercapnia (H) 02/27/2020     Priority: Medium    Acute pulmonary embolism (H) 02/27/2020     Priority: Medium    Chronic obstructive pulmonary disease with acute exacerbation (H) 02/18/2020     Priority: Medium    Essential hypertension 02/15/2020     Priority: Medium    Respiratory failure (H) 02/15/2020     Priority: Medium    Hyperuricemia 04/07/2015     Priority: Medium    ED (erectile dysfunction) 02/11/2015     Priority: Medium    Tobacco abuse 03/04/2014     Priority: Medium        Past Medical History:    Past Medical History:   Diagnosis Date    Abnormal liver function test 1/1/2011    Bronchitis, not specified as acute or chronic 2/16/2005    Osteoarthritis 1/1/2011    Prediabetes     Tobacco abuse 1/1/2011    Unspecified essential hypertension 9/1/2006       Past Surgical History:    Past Surgical History:   Procedure Laterality Date    " cysts removed from neck      Bilateral    vasectomy      wisdom teeth extraction         Family History:    Family History   Problem Relation Age of Onset    Heart Failure Father 72        CHF, cause of death    Diabetes Father     C.A.D. Mother     Diabetes Mother     C.A.D. Brother 51        Cause of death    Diabetes Brother     Hypertension Brother     Other - See Comments Brother         Multiple Sclerosis       Social History:  Marital Status:   [2]  Social History     Tobacco Use    Smoking status: Former     Current packs/day: 0.00     Average packs/day: 1 pack/day for 40.1 years (40.1 ttl pk-yrs)     Types: Cigarettes     Start date: 1980     Quit date: 2020     Years since quittin.3    Smokeless tobacco: Never    Tobacco comments:     pt quit 20   Vaping Use    Vaping status: Never Used   Substance Use Topics    Alcohol use: No    Drug use: No        Medications:    doxycycline hyclate (VIBRAMYCIN) 100 MG capsule  predniSONE (DELTASONE) 10 MG tablet  albuterol (PROAIR HFA/PROVENTIL HFA/VENTOLIN HFA) 108 (90 Base) MCG/ACT inhaler  albuterol (PROVENTIL) (2.5 MG/3ML) 0.083% neb solution  amLODIPine (NORVASC) 10 MG tablet  ascorbic acid 1000 MG TABS tablet  ASMANEX  MCG/ACT inhaler  aspirin (ASA) 81 MG chewable tablet  Bacillus Coagulans-Inulin (PROBIOTIC) 1-250 BILLION-MG CAPS  carvedilol (COREG) 6.25 MG tablet  cloNIDine (CATAPRES-TTS3) 0.3 MG/24HR WK patch  ezetimibe (ZETIA) 10 MG tablet  Glucosamine-Chondroit-Vit C-Mn (GLUCOSAMINE CHONDROITIN 1500 COMPLEX) CAPS  hydrochlorothiazide (HYDRODIURIL) 25 MG tablet  metFORMIN (GLUCOPHAGE) 500 MG tablet  multivitamin w/minerals (THERA-VIT-M) tablet  Plant Sterols and Stanols 450 MG TABS  STIOLTO RESPIMAT 2.5-2.5 MCG/ACT AERS  vitamin C with B complex (B COMPLEX-C) tablet  vitamin E (TOCOPHEROL) 400 units (180 mg) capsule          Review of Systems   Constitutional:  Negative for activity change, appetite change, fatigue and fever.  "  Respiratory:  Positive for cough, shortness of breath and wheezing.    Cardiovascular:  Negative for chest pain, palpitations and leg swelling.   Gastrointestinal:  Negative for abdominal pain.   Genitourinary: Negative.    Musculoskeletal:  Negative for back pain.       Physical Exam   BP: (!) 151/121  Pulse: 81  Temp: 97.4  F (36.3  C)  Resp: 20  Height: 185.4 cm (6' 1\")  Weight: 86.6 kg (191 lb)  SpO2: 94 %      Physical Exam  Vitals and nursing note reviewed.   Constitutional:       General: He is not in acute distress.     Appearance: Normal appearance. He is normal weight. He is not ill-appearing, toxic-appearing or diaphoretic.   Cardiovascular:      Rate and Rhythm: Normal rate and regular rhythm.   Pulmonary:      Comments: He has no significant tachypnea.  The patient does have some mild increased work of breathing.  He has rather appreciable expiratory wheezes bilaterally with distant lung sounds.  Skin:     General: Skin is warm and dry.      Capillary Refill: Capillary refill takes less than 2 seconds.   Neurological:      General: No focal deficit present.      Mental Status: He is alert and oriented to person, place, and time.         ED Course     ED Course as of 06/12/24 1354   Wed Jun 12, 2024   1147 My independent review of the EKG shows a normal sinus rhythm at a rate of 73.  Normal UT interval.  Normal QRS duration.  Normal QTc.  Normal axis.  There are no concerning ST segments.  No concerning T waves.  Comparison the previous EKG shows no significant change.   1213 My independent review of the chest x-ray shows no evidence of focal consolidation, pneumothorax, or widened mediastinum.     Procedures              Critical Care time:  none               Results for orders placed or performed during the hospital encounter of 06/12/24 (from the past 24 hour(s))   CBC with platelets differential    Narrative    The following orders were created for panel order CBC with platelets " differential.  Procedure                               Abnormality         Status                     ---------                               -----------         ------                     CBC with platelets and d...[[id number]]                      Final result                 Please view results for these tests on the individual orders.   Basic metabolic panel   Result Value Ref Range    Sodium 137 135 - 145 mmol/L    Potassium 4.2 3.4 - 5.3 mmol/L    Chloride 101 98 - 107 mmol/L    Carbon Dioxide (CO2) 25 22 - 29 mmol/L    Anion Gap 11 7 - 15 mmol/L    Urea Nitrogen 22.9 8.0 - 23.0 mg/dL    Creatinine 0.83 0.67 - 1.17 mg/dL    GFR Estimate >90 >60 mL/min/1.73m2    Calcium 9.7 8.8 - 10.2 mg/dL    Glucose 129 (H) 70 - 99 mg/dL   Nt probnp inpatient (BNP)   Result Value Ref Range    N terminal Pro BNP Inpatient <36 0 - 900 pg/mL   D dimer quantitative   Result Value Ref Range    D-Dimer Quantitative 1.52 (H) 0.00 - 0.50 ug/mL FEU    Narrative    This D-dimer assay is intended for use in conjunction with a clinical pretest probability assessment model to exclude pulmonary embolism (PE) and deep venous thrombosis (DVT) in outpatients suspected of PE or DVT. The cut-off value is 0.50 ug/mL FEU.    For patients 50 years of age or older, the application of age-adjusted cut-off values for D-Dimer may increase the specificity without significant effect on sensitivity. The literature suggested calculation age adjusted cut-off in ug/L = age in years x 10 ug/L. The results in this laboratory are reported as ug/mL rather than ug/L. The calculation for age adjusted cut off in ug/mL= age in years x 0.01 ug/mL. For example, the cut off for a 76 year old male is 74 x 0.01 ug/mL = 0.76 ug/mL (760 ug/L).    SHARA Reyes et al. Age adjusted D-dimer cut-off levels to rule out pulmonary embolism: The ADJUST-PE Study. BESSIE 2014;311:2620-3368.; HJ Jordyn et al. Diagnostic accuracy of conventional or age adjusted D-dimer cutoff values in  older patients with suspected venous thromboembolism. Systemic review and meta-analysis. BMJ 2013:346:f2492.   CBC with platelets and differential   Result Value Ref Range    WBC Count 8.5 4.0 - 11.0 10e3/uL    RBC Count 4.99 4.40 - 5.90 10e6/uL    Hemoglobin 15.4 13.3 - 17.7 g/dL    Hematocrit 46.1 40.0 - 53.0 %    MCV 92 78 - 100 fL    MCH 30.9 26.5 - 33.0 pg    MCHC 33.4 31.5 - 36.5 g/dL    RDW 13.1 10.0 - 15.0 %    Platelet Count 201 150 - 450 10e3/uL    % Neutrophils 69 %    % Lymphocytes 19 %    % Monocytes 6 %    % Eosinophils 5 %    % Basophils 0 %    % Immature Granulocytes 0 %    NRBCs per 100 WBC 0 <1 /100    Absolute Neutrophils 5.8 1.6 - 8.3 10e3/uL    Absolute Lymphocytes 1.6 0.8 - 5.3 10e3/uL    Absolute Monocytes 0.5 0.0 - 1.3 10e3/uL    Absolute Eosinophils 0.4 0.0 - 0.7 10e3/uL    Absolute Basophils 0.0 0.0 - 0.2 10e3/uL    Absolute Immature Granulocytes 0.0 <=0.4 10e3/uL    Absolute NRBCs 0.0 10e3/uL   Extra Tube    Narrative    The following orders were created for panel order Extra Tube.  Procedure                               Abnormality         Status                     ---------                               -----------         ------                     Extra Red Top Tube[[id number]]                               Final result               Extra Heparinized Syringe[[id number]]                        Final result                 Please view results for these tests on the individual orders.   Extra Red Top Tube   Result Value Ref Range    Hold Specimen JIC    Extra Heparinized Syringe   Result Value Ref Range    Hold Specimen JIC    Troponin T, High Sensitivity   Result Value Ref Range    Troponin T, High Sensitivity 10 <=22 ng/L   XR Chest 2 Views    Narrative    PROCEDURE:  XR CHEST 2 VIEWS    HISTORY:  Shortness of breath.     COMPARISON:  2022    FINDINGS:   The cardiac silhouette is normal in size. The pulmonary vasculature is  normal.  Is a left basilar scarring is stable as compared to  the 2022.  No pleural effusion or pneumothorax.      Impression    IMPRESSION:  No acute cardiopulmonary disease.      MURTAZA ECHOLS MD         SYSTEM ID:  [id number]   CTA Chest with Contrast    Narrative    PROCEDURE: CTA CHEST WITH CONTRAST 6/12/2024 12:50 PM    HISTORY: Worsening shortness of breath, elevated D-dimer, history of  pulmonary embolism.    COMPARISONS: None.    Meds/Dose Given: Isovue 370 65ml Given    TECHNIQUE: CT angiogram of the chest with sagittal and coronal MIPS  reconstructions    FINDINGS: There are no pulmonary emboli. The thoracic aorta is normal.  The heart is normal in size.    There is some linear atelectasis or scarring at the lung bases. No  pleural abnormalities are seen.    The hilar and mediastinal lymph nodes are normal in caliber. Axillary  and supraclavicular lymph nodes are normal. The upper portion of the  liver spleen and pancreas are normal. There is a large abdominal  aortic aneurysm. The entire aneurysm was not included on the study.  The upper portion of the aneurysm measures 5.5 cm. Degenerative  changes are present in the thoracic spine.         Impression    IMPRESSION: No pulmonary emboli.    There is an at least a 5.5 cm infrarenal abdominal aortic aneurysm.  The entire aneurysm is not included on the study    Bibasilar atelectasis or scarring in the lower lobes.    MURTAZA ECHOLS MD         SYSTEM ID:  [id number]   US Abdominal Aorta Imaging    Narrative    EXAMINATION: US ABDOMINAL AORTA, 6/12/2024 1:36 PM     COMPARISON: None.    HISTORY: Abdominal aortic aneurysm    TECHNIQUE: Gray-scale and color Doppler ultrasound of the abdominal  aorta.    FINDINGS: There is a 5.5 cm abdominal aortic aneurysm noted. The  common iliac arteries are nonaneurysmal.    The    Impression    IMPRESSION:  1.  5.5 cm infrarenal abdominal aortic aneurysm    ------------------------------------------------------------  Aorta diameter measurement classification:  < 2.5cm: Normal.  2.5  - 2.9cm: Ectatic.  >3cm: Aneurysmal.    MURTAZA ECHOLS MD         SYSTEM ID:  [id number]       Medications   predniSONE (DELTASONE) tablet 60 mg (60 mg Oral $Given 6/12/24 1214)   albuterol (PROVENTIL) neb solution 2.5 mg (2.5 mg Nebulization $Given 6/12/24 1131)   iopamidol (ISOVUE-370) solution 65 mL (65 mLs Intravenous $Given 6/12/24 1240)   sodium chloride (PF) 0.9% PF flush 100 mL (100 mLs Intravenous $Given 6/12/24 1240)       Assessments & Plan (with Medical Decision Making)   58-year-old male with a history of severe COPD presents with concerns of cough and shortness of breath.  Patient's subjective and objective findings are most consistent with an exacerbation of underlying COPD.  CT angiogram of the chest due to elevated D-dimer and previous PE was unremarkable.  It did show an infrarenal AAA.  Ultrasound showed the same.  No concerns over dissection.  He has no abdominal pain.  This was discussed with the patient.  Note placed to his primary physician.  Strict return precautions were provided.  Close clinic follow-up was stressed.  Outpatient doxycycline and prednisone.  Treated in the emergency department with nebulization as well as oral steroids.  Oxygen saturations are acceptable on room air.  He has no evidence of tachypnea, increased work of breathing, or respiratory distress.  The remainder of his workup is otherwise unremarkable for an emergent process.    Mr. Man has also agreed to schedule a follow up appointment with Dr. Pereira for re-evaluation and further management. He verbalized an understanding of the results of our workup and agree with the complete discharge plan including instructions for return and follow up.  There is no indication for further investigation or treatment on an emergent basis.  There is no reasonably foreseeable injury that would be associated with discharge and close outpatient follow-up.      This document was prepared using a combination of typing and voice  generated software.  While every attempt was made for accuracy, spelling and grammatical errors may exist.     I have reviewed the nursing notes.    I have reviewed the findings, diagnosis, plan and need for follow up with the patient.           Medical Decision Making  The patient's presentation was of moderate complexity (a chronic illness mild to moderate exacerbation, progression, or side effect of treatment).    The patient's evaluation involved:  ordering and/or review of 3+ test(s) in this encounter (multiple labs and images as well as EKG)    The patient's management necessitated moderate risk (prescription drug management including medications given in the ED) and moderate risk (IV contrast administration).        New Prescriptions    DOXYCYCLINE HYCLATE (VIBRAMYCIN) 100 MG CAPSULE    Take 1 capsule (100 mg) by mouth 2 times daily for 7 days    PREDNISONE (DELTASONE) 10 MG TABLET    Take 4 tablets daily for 5 days,  take 2 tablets daily for 3 days, take 1 tablet daily for 3 days, take half a tablet for 3 days.       Final diagnoses:   Chronic obstructive pulmonary disease with acute exacerbation (H)   Infrarenal abdominal aortic aneurysm (AAA) without rupture (H24)       6/12/2024   [facility]       Doir Mckeon PA-C  06/12/24 [id number]

## 2024-06-13 RX ORDER — ALBUTEROL SULFATE 0.83 MG/ML
SOLUTION RESPIRATORY (INHALATION)
Qty: 25 ML | Refills: 3 | Status: SHIPPED | OUTPATIENT
Start: 2024-06-13 | End: 2024-07-18

## 2024-06-13 NOTE — TELEPHONE ENCOUNTER
Atmpt 1: LVM with my extension for patient to call and schedule f/up and discuss vascular referral

## 2024-06-13 NOTE — TELEPHONE ENCOUNTER
Received note from ER staff.  Patient with newly diagnosed AAA.  Needs to see vascular surgeon.  [facility].  [facility]  [facility]  [facility] [facility]?    Please clarify.    Please also schedule ER follow up with me.

## 2024-06-13 NOTE — TELEPHONE ENCOUNTER
6/13/2024 10:36 AM  Writer called pt. Pt is already scheduled for ER follow-up next week.     Pt would prefer [facility]. Updated referral PCP to advise.    Pt also requesting refill of albuterol neb, pended.     Future Appointments 6/13/2024 - 12/10/2024        Date Visit Type Length Department Provider     7/18/2024  4:00 PM OFFICE VISIT 30 min [facility] Ana Pereira MD    Location Instructions:     From [place]: Take [address]. Turn left at [address]. Turn left at the first stoplight on [address]. At the first stop sign, take a right onto [address]. The upper level parking lot will be on the left. East Entrance Door number 10.   From [place]: Take [address]. Take a right at [address]. At the first stop sign, take a right onto [address]. The upper level parking lot will be on the left. East Entrance Door number 10.   From [place]: Take [address]. Take the [address] ramp towards [place]. Turn left onto [address]. Take a slight right onto [address]. Turn left at the first stoplight on [address]. At the first stop sign, take a right onto [address]. The upper level parking lot will be on the left. East Entrance Door number 10.                    Pt notified to allow 7-10 business days to process referral. If not contacted to schedule an appointment please notify us.      Sona Lizarraga RN

## 2024-06-14 DIAGNOSIS — J44.1 CHRONIC OBSTRUCTIVE PULMONARY DISEASE WITH ACUTE EXACERBATION (H): ICD-10-CM

## 2024-06-14 DIAGNOSIS — R06.02 SOB (SHORTNESS OF BREATH): ICD-10-CM

## 2024-06-14 DIAGNOSIS — J44.9 STAGE 3 SEVERE COPD BY GOLD CLASSIFICATION (H): ICD-10-CM

## 2024-06-14 RX ORDER — ALBUTEROL SULFATE 0.83 MG/ML
SOLUTION RESPIRATORY (INHALATION)
Qty: 25 ML | Refills: 3 | Status: CANCELLED | OUTPATIENT
Start: 2024-06-14

## 2024-06-15 LAB
ATRIAL RATE - MUSE: 73 BPM
DIASTOLIC BLOOD PRESSURE - MUSE: NORMAL MMHG
INTERPRETATION ECG - MUSE: NORMAL
P AXIS - MUSE: 99 DEGREES
PR INTERVAL - MUSE: 184 MS
QRS DURATION - MUSE: 92 MS
QT - MUSE: 414 MS
QTC - MUSE: 456 MS
R AXIS - MUSE: 84 DEGREES
SYSTOLIC BLOOD PRESSURE - MUSE: NORMAL MMHG
T AXIS - MUSE: 63 DEGREES
VENTRICULAR RATE- MUSE: 73 BPM

## 2024-07-16 NOTE — PROGRESS NOTES
Assessment & Plan     Chronic obstructive pulmonary disease, unspecified COPD type (H)  Chronic obstructive pulmonary disease with acute exacerbation (H)  Acute exacerbation resolved.  - albuterol (PROVENTIL) (2.5 MG/3ML) 0.083% neb solution; Take 1 vial (2.5 mg) by nebulization every 6 hours as needed for shortness of breath / dyspnea or wheezing    Stage 3 severe COPD by GOLD classification (H)  As above  - albuterol (PROVENTIL) (2.5 MG/3ML) 0.083% neb solution; Take 1 vial (2.5 mg) by nebulization every 6 hours as needed for shortness of breath / dyspnea or wheezing    Type 2 diabetes mellitus without complication, without long-term current use of insulin (H)  Not monitoring glucose.  Overdue for A1c.  Returning for labs Monday.  Foot exam completed.  Eye referral placed.  Stopped his metformin on his own?  Will reassess based on updated labs.  - Adult Eye  Referral; Future  - Albumin Random Urine Quantitative with Creat Ratio; Future  - Hemoglobin A1c; Future  - Lipid Profile (Chol, Trig, HDL, LDL calc); Future  - TSH with free T4 reflex; Future  - Comprehensive metabolic panel (BMP + Alb, Alk Phos, ALT, AST, Total. Bili, TP); Future  - Adult Eye  Referral; Future    Essential hypertension  Not controlled.  Shows home readings that are at goa?  With AAA - worried about risk of rupture.  Agreeable to increase Coreg to 12.5 mg BID.  Continue other medications.  - Comprehensive metabolic panel (BMP + Alb, Alk Phos, ALT, AST, Total. Bili, TP); Future  - hydrochlorothiazide (HYDRODIURIL) 25 MG tablet; Take 1 tablet (25 mg) by mouth daily  - carvedilol (COREG) 12.5 MG tablet; Take 1 tablet (12.5 mg) by mouth 2 times daily (with meals)    Hyperlipidemia, unspecified hyperlipidemia type  Declines statin and zetia - stopped on his own.  Update fasting labs.    - Lipid Profile (Chol, Trig, HDL, LDL calc); Future  - Comprehensive metabolic panel (BMP + Alb, Alk Phos, ALT, AST, Total. Bili, TP);  "Future    Infrarenal abdominal aortic aneurysm (AAA) without rupture (H24)  5.5 cm on CT and US 6/2024.  Referral to vascular surgery made.  Sent to [facility].  Per patient not yet scheduled?  Phone tag?  Given direct number to [facility] vascular today - he will.  Instructed to not lift heavy, to take his medications, and avoid tobacco.    Former smoker  Quit 2020    Screening for prostate cancer  - PSA, screen; Future          BMI  Estimated body mass index is 25.46 kg/m  as calculated from the following:    Height as of this encounter: 1.854 m (6' 1\").    Weight as of this encounter: 87.5 kg (193 lb).   Weight management plan: Discussed healthy diet and exercise guidelines  The longitudinal plan of care for the diagnosis(es)/condition(s) as documented were addressed during this visit. Due to the added complexity in care, I will continue to support Ry in the subsequent management and with ongoing continuity of care.    See Patient Instructions    No follow-ups on file.    Subjective   Ry is a 58 year old, presenting for the following health issues:  ER follow up , COPD, Hypertension, Diabetes, and Lipids        7/18/2024     3:44 PM   Additional Questions   Roomed by wendy khan   Accompanied by self         7/18/2024     3:44 PM   Patient Reported Additional Medications   Patient reports taking the following new medications none     History of Present Illness       Reason for visit:  Followup    He eats 4 or more servings of fruits and vegetables daily.He consumes 0 sweetened beverage(s) daily.He exercises with enough effort to increase his heart rate 9 or less minutes per day.  He exercises with enough effort to increase his heart rate 4 days per week.   He is taking medications regularly.       ED/UC Followup:  Facility:  [facility]   Date of visit: 6/12/24  Reason for visit:COPD  /121    CTA negative  Doxycycline, Prednisone, neb.  Current Status: feeling better than normal     AAA - new diagnosis in ER on CT " "and US - 5.5 cm -   Vascular surgery referral 6/12/24 - [facility]  Not scheduled.  Misplaced number to call - but found it - will call.  [facility].    Overdue for HCM/physical, etc.  Last visit 6/2023.    Work physical.  BP high - then rechecks.    Colon screen - declines - [facility]   Vaccines - decline     Lab 9am  Monday - fasting     Shift work.    COPD Follow-Up  Overall, how are your COPD symptoms since your last clinic visit?  No change  How much fatigue or shortness of breath do you have when you are walking?  Same as usual  How much shortness of breath do you have when you are resting?  None  How often do you cough? Often  Have you noticed any change in your sputum/phlegm?  No  Have you experienced a recent fever? No  Please describe how far you can walk without stopping to rest:  Greater than 2 miles  How many flights of stairs are you able to walk up without stopping?  2  Have you had any Emergency Room Visits, Urgent Care Visits, or Hospital Admissions because of your COPD since your last office visit?  Yes    How many times have you gone to the ED, Urgent Care, or been hospitalized for COPD since your last clinic visit?  1    History   Smoking Status    Former    Packs/day: 1.00    Years: 37.00    Types: Cigarettes    Start date: 1/1/1980    Quit date: 2/1/2020   Smokeless Tobacco    Never     No results found for: \"FEV1\", \"AXA6XBO\"    Diabetes Follow-up - prior metformin - stopped after a month or 2?  How often are you checking your blood sugar? Not at all  What concerns do you have today about your diabetes? None   Do you have any of these symptoms? (Select all that apply)  No numbness or tingling in feet.  No redness, sores or blisters on feet.  No complaints of excessive thirst.  No reports of blurry vision.  No significant changes to weight.  Have you had a diabetic eye exam in the last 12 months? No  Eye exam- due  Foot exam - agreeable - no symptoms  Down 40 pounds past " "year  Low carb diet now  Fruits/vegetables/lean protein only    Hyperlipidemia Follow-Up - prior zetia - stopped on own; joint swelling  Are you regularly taking any medication or supplement to lower your cholesterol?   No  Are you having muscle aches or other side effects that you think could be caused by your cholesterol lowering medication?  No  Clonidine  Norvasc  Coreg 6.25 mg BID  Prior [facility]oprol[facility] - [facility] stopped [facility]    Hypertension Follow-up  Do you check your blood pressure regularly outside of the clinic? Yes   Are you following a low salt diet? Yes  Are your blood pressures ever more than 140 on the top number (systolic) OR more   than 90 on the bottom number (diastolic), for example 140/90? No    BP Readings from Last 2 Encounters:   07/18/24 (!) 140/92   06/12/24 154/99     Hemoglobin A1C (%)   Date Value   08/10/2023 6.8 (H)   04/11/2023 7.5 (H)   03/13/2020 6.4 (H)   02/16/2020 6.4 (H)     LDL Cholesterol Calculated (mg/dL)   Date Value   08/10/2023 186 (H)   05/02/2023 194 (H)   03/13/2020 86   05/07/2013 129           Review of Systems  Constitutional, HEENT, cardiovascular, pulmonary, gi and gu systems are negative, except as otherwise noted.      Objective    BP (!) 140/92 (BP Location: Right arm, Patient Position: Sitting, Cuff Size: Adult Regular)   Pulse 68   Temp 97.8  F (36.6  C) (Tympanic)   Ht 1.854 m (6' 1\")   Wt 87.5 kg (193 lb)   SpO2 94%   BMI 25.46 kg/m    Body mass index is 25.46 kg/m .  Physical Exam   GENERAL: alert and no distress  NECK: no adenopathy, no asymmetry, masses, or scars  RESP: lungs clear to auscultation - no rales, rhonchi or wheezes  CV: regular rate and rhythm, normal S1 S2, no S3 or S4, no murmur, click or rub, no peripheral edema  ABDOMEN: soft, nontender, no hepatosplenomegaly, no masses and bowel sounds normal  MS: no gross musculoskeletal defects noted, no edema  SKIN: no suspicious lesions or rashes  NEURO: Normal strength and tone, mentation intact " and speech normal  PSYCH: mentation appears normal, affect normal/bright  Diabetic foot exam: normal DP and PT pulses, no trophic changes or ulcerative lesions, normal sensory exam, and normal monofilament exam    Future labs ordered        Signed Electronically by: Ana Stark MD

## 2024-07-18 ENCOUNTER — OFFICE VISIT (OUTPATIENT)
Dept: FAMILY MEDICINE | Facility: OTHER | Age: 59
End: 2024-07-18
Attending: FAMILY MEDICINE
Payer: COMMERCIAL

## 2024-07-18 VITALS
DIASTOLIC BLOOD PRESSURE: 92 MMHG | WEIGHT: 193 LBS | SYSTOLIC BLOOD PRESSURE: 140 MMHG | OXYGEN SATURATION: 94 % | HEIGHT: 73 IN | TEMPERATURE: 97.8 F | BODY MASS INDEX: 25.58 KG/M2 | HEART RATE: 68 BPM

## 2024-07-18 DIAGNOSIS — J44.1 CHRONIC OBSTRUCTIVE PULMONARY DISEASE WITH ACUTE EXACERBATION (H): ICD-10-CM

## 2024-07-18 DIAGNOSIS — I10 ESSENTIAL HYPERTENSION: ICD-10-CM

## 2024-07-18 DIAGNOSIS — Z87.891 FORMER SMOKER: ICD-10-CM

## 2024-07-18 DIAGNOSIS — I71.43 INFRARENAL ABDOMINAL AORTIC ANEURYSM (AAA) WITHOUT RUPTURE (H): ICD-10-CM

## 2024-07-18 DIAGNOSIS — Z12.5 SCREENING FOR PROSTATE CANCER: ICD-10-CM

## 2024-07-18 DIAGNOSIS — J44.9 CHRONIC OBSTRUCTIVE PULMONARY DISEASE, UNSPECIFIED COPD TYPE (H): Primary | ICD-10-CM

## 2024-07-18 DIAGNOSIS — E78.5 HYPERLIPIDEMIA, UNSPECIFIED HYPERLIPIDEMIA TYPE: ICD-10-CM

## 2024-07-18 DIAGNOSIS — J44.9 STAGE 3 SEVERE COPD BY GOLD CLASSIFICATION (H): ICD-10-CM

## 2024-07-18 DIAGNOSIS — E11.9 TYPE 2 DIABETES MELLITUS WITHOUT COMPLICATION, WITHOUT LONG-TERM CURRENT USE OF INSULIN (H): ICD-10-CM

## 2024-07-18 PROCEDURE — G2211 COMPLEX E/M VISIT ADD ON: HCPCS | Performed by: FAMILY MEDICINE

## 2024-07-18 PROCEDURE — 99214 OFFICE O/P EST MOD 30 MIN: CPT | Performed by: FAMILY MEDICINE

## 2024-07-18 RX ORDER — HYDROCHLOROTHIAZIDE 25 MG/1
25 TABLET ORAL DAILY
Qty: 90 TABLET | Refills: 1 | Status: SHIPPED | OUTPATIENT
Start: 2024-07-18

## 2024-07-18 RX ORDER — CARVEDILOL 6.25 MG/1
6.25 TABLET ORAL 2 TIMES DAILY WITH MEALS
Qty: 180 TABLET | Refills: 1 | Status: CANCELLED | OUTPATIENT
Start: 2024-07-18

## 2024-07-18 RX ORDER — CARVEDILOL 12.5 MG/1
12.5 TABLET ORAL 2 TIMES DAILY WITH MEALS
Qty: 60 TABLET | Refills: 1 | Status: SHIPPED | OUTPATIENT
Start: 2024-07-18

## 2024-07-18 RX ORDER — ALBUTEROL SULFATE 0.83 MG/ML
SOLUTION RESPIRATORY (INHALATION)
Qty: 25 ML | Refills: 3 | Status: SHIPPED | OUTPATIENT
Start: 2024-07-18

## 2024-07-18 RX ORDER — CARVEDILOL 12.5 MG/1
12.5 TABLET ORAL 2 TIMES DAILY WITH MEALS
Qty: 180 TABLET | Refills: 1 | Status: SHIPPED | OUTPATIENT
Start: 2024-07-18 | End: 2024-07-18

## 2024-07-18 NOTE — PATIENT INSTRUCTIONS
Call vascular surgery to schedule for AAA 5.5 cm - [facility].  [phone]    Increase Coreg/carvedilol to 12.5 mg twice daily - reduce BP/pulse - risk CV.    Return Monday for fasting labs 9 am.  Ok to take medications, water, and black coffee.    Eye referral placed.    Colon screening also due.

## 2024-07-18 NOTE — Clinical Note
1 - put on lab for Monday at 9am  2 - be sure [facility] vascular has prior referral from last month 3 - eye referral to be faxed

## 2024-07-22 ENCOUNTER — LAB (OUTPATIENT)
Dept: LAB | Facility: OTHER | Age: 59
End: 2024-07-22
Payer: COMMERCIAL

## 2024-07-22 DIAGNOSIS — E78.5 HYPERLIPIDEMIA, UNSPECIFIED HYPERLIPIDEMIA TYPE: ICD-10-CM

## 2024-07-22 DIAGNOSIS — E11.9 TYPE 2 DIABETES MELLITUS WITHOUT COMPLICATION, WITHOUT LONG-TERM CURRENT USE OF INSULIN (H): ICD-10-CM

## 2024-07-22 DIAGNOSIS — Z12.5 SCREENING FOR PROSTATE CANCER: ICD-10-CM

## 2024-07-22 DIAGNOSIS — I10 ESSENTIAL HYPERTENSION: ICD-10-CM

## 2024-07-22 LAB
ALBUMIN SERPL BCG-MCNC: 4.4 G/DL (ref 3.5–5.2)
ALP SERPL-CCNC: 75 U/L (ref 40–150)
ALT SERPL W P-5'-P-CCNC: 21 U/L (ref 0–70)
ANION GAP SERPL CALCULATED.3IONS-SCNC: 12 MMOL/L (ref 7–15)
AST SERPL W P-5'-P-CCNC: 22 U/L (ref 0–45)
BILIRUB SERPL-MCNC: 0.5 MG/DL
BUN SERPL-MCNC: 24.8 MG/DL (ref 8–23)
CALCIUM SERPL-MCNC: 9.4 MG/DL (ref 8.8–10.4)
CHLORIDE SERPL-SCNC: 105 MMOL/L (ref 98–107)
CHOLEST SERPL-MCNC: 257 MG/DL
CREAT SERPL-MCNC: 0.84 MG/DL (ref 0.67–1.17)
EGFRCR SERPLBLD CKD-EPI 2021: >90 ML/MIN/1.73M2
EST. AVERAGE GLUCOSE BLD GHB EST-MCNC: 143 MG/DL
FASTING STATUS PATIENT QL REPORTED: YES
FASTING STATUS PATIENT QL REPORTED: YES
GLUCOSE SERPL-MCNC: 104 MG/DL (ref 70–99)
HBA1C MFR BLD: 6.6 %
HCO3 SERPL-SCNC: 23 MMOL/L (ref 22–29)
HDLC SERPL-MCNC: 59 MG/DL
LDLC SERPL CALC-MCNC: 177 MG/DL
NONHDLC SERPL-MCNC: 198 MG/DL
POTASSIUM SERPL-SCNC: 4.1 MMOL/L (ref 3.4–5.3)
PROT SERPL-MCNC: 7.5 G/DL (ref 6.4–8.3)
PSA SERPL DL<=0.01 NG/ML-MCNC: 1.03 NG/ML (ref 0–4.5)
SODIUM SERPL-SCNC: 140 MMOL/L (ref 135–145)
TRIGL SERPL-MCNC: 106 MG/DL
TSH SERPL DL<=0.005 MIU/L-ACNC: 0.66 UIU/ML (ref 0.3–4.2)

## 2024-07-22 PROCEDURE — 84443 ASSAY THYROID STIM HORMONE: CPT

## 2024-07-22 PROCEDURE — 83036 HEMOGLOBIN GLYCOSYLATED A1C: CPT

## 2024-07-22 PROCEDURE — 36415 COLL VENOUS BLD VENIPUNCTURE: CPT

## 2024-07-22 PROCEDURE — 80061 LIPID PANEL: CPT

## 2024-07-22 PROCEDURE — 80053 COMPREHEN METABOLIC PANEL: CPT

## 2024-07-22 PROCEDURE — G0103 PSA SCREENING: HCPCS

## 2024-07-22 RX ORDER — ATORVASTATIN CALCIUM 40 MG/1
40 TABLET, FILM COATED ORAL DAILY
Qty: 90 TABLET | Refills: 3 | Status: SHIPPED | OUTPATIENT
Start: 2024-07-22 | End: 2024-07-30 | Stop reason: SINTOL

## 2024-07-25 ENCOUNTER — TELEPHONE (OUTPATIENT)
Dept: FAMILY MEDICINE | Facility: OTHER | Age: 59
End: 2024-07-25

## 2024-07-25 NOTE — TELEPHONE ENCOUNTER
Lizzette from [facility] called to let Dr. Pereira know the pt has been scheduled to see Dr. Williams Toro for VASCULAR SURGERY REFERRAL

## 2024-07-29 ENCOUNTER — TELEPHONE (OUTPATIENT)
Dept: FAMILY MEDICINE | Facility: OTHER | Age: 59
End: 2024-07-29

## 2024-07-29 DIAGNOSIS — E78.5 HYPERLIPIDEMIA, UNSPECIFIED HYPERLIPIDEMIA TYPE: Primary | ICD-10-CM

## 2024-07-29 NOTE — TELEPHONE ENCOUNTER
Care team 1  /  Randall    2:31 PM    Reason for Call: OVERBOOK    Patient is having the following symptoms: PRE OP NEEDED     SURGERY DETAILS:  Date: wanted to know how soon appointment could be before setting surgery date  Hospital: [facility]  Doctor: Dr. Williams Toro  Procedure: Vascular      The patient is requesting an appointment for (see above)    Was an appointment offered for this call? No  If yes : Appointment type              Date    Preferred method for responding to this message: Telephone Call    What is your phone number [phone]     If we cannot reach you directly, may we leave a detailed response at the number you provided? Yes    Can this message wait until your PCP/provider returns, if unavailable today? No, Please return call as soon as possible.  Thank you    Em Marley

## 2024-07-29 NOTE — TELEPHONE ENCOUNTER
Spoke with patient, he states he is unable to take the Lipitor states it causes him to have horrible muscle aches.

## 2024-07-30 RX ORDER — EZETIMIBE 10 MG/1
10 TABLET ORAL DAILY
Qty: 90 TABLET | Refills: 3 | Status: SHIPPED | OUTPATIENT
Start: 2024-07-30

## 2024-07-30 NOTE — TELEPHONE ENCOUNTER
Need to know surgical date.  Then we set up preop.  Has to be within 30 days of procedure.  Typically I see the preops 1-2 weeks prior to surgical date.

## 2024-07-30 NOTE — TELEPHONE ENCOUNTER
If unable to tolerate statin - prior Crestor, Lipitor - could try Zetia - non statin.   Script sent.

## 2024-07-31 NOTE — TELEPHONE ENCOUNTER
"Spoke with patient. He states he \"may\" pick this up, but may not. Upset that he is being put on so many medications. States that he is tired of missing work when his joints get sore and feels that someone should be reimbursing him 500 dollars per day when he misses work. States no one has even given him numbers on his labs, just that they have been elevated.   Went over recent lipid profile labs with patient. Discussed that new medication is not a statin.   He will think about starting the medication and keep the clinic updated.   "

## 2024-07-31 NOTE — TELEPHONE ENCOUNTER
Spoke with patient. Informed of timeframe preops are done. He will call [facility] and get surgery date and let us know.

## 2024-08-13 ENCOUNTER — TELEPHONE (OUTPATIENT)
Dept: FAMILY MEDICINE | Facility: OTHER | Age: 59
End: 2024-08-13

## 2024-08-13 NOTE — TELEPHONE ENCOUNTER
4:06 PM    Reason for Call: OVERBOOK/PREOP       Patient is having aortic aneurysm surgery with Dr Williams Toro at [facility] on 8/29/2024.    The patient is requesting an appointment for Preop with Dr Pereira.    Was an appointment offered for this call? No  If yes : Appointment type              Date    Preferred method for responding to this message: Telephone Call  What is your phone number ? [phone]    If we cannot reach you directly, may we leave a detailed response at the number you provided? Yes    Can this message wait until your PCP/provider returns, if unavailable today? No

## 2024-08-13 NOTE — TELEPHONE ENCOUNTER
1:56 PM    Reason for Call: OVERBOOK    Patient needs a preop / [facility] / DOS 8-29- / Triple A repair / Dr. Toro        The patient is requesting an appointment for prior to surgery with Dr Pereira or any PCP.    Was an appointment offered for this call? No  If yes : Appointment type              Date    Preferred method for responding to this message: Telephone Call  What is your phone number ? [phone]     If we cannot reach you directly, may we leave a detailed response at the number you provided? Did not speak to pt, Matt called on this gave all information for procedure date     Can this message wait until your PCP/provider returns, if unavailable today? Rani Bird

## 2024-08-19 ENCOUNTER — TELEPHONE (OUTPATIENT)
Dept: FAMILY MEDICINE | Facility: OTHER | Age: 59
End: 2024-08-19

## 2024-08-19 ENCOUNTER — OFFICE VISIT (OUTPATIENT)
Dept: FAMILY MEDICINE | Facility: OTHER | Age: 59
End: 2024-08-19
Attending: FAMILY MEDICINE
Payer: COMMERCIAL

## 2024-08-19 VITALS
HEART RATE: 66 BPM | WEIGHT: 198.8 LBS | TEMPERATURE: 98.1 F | DIASTOLIC BLOOD PRESSURE: 94 MMHG | RESPIRATION RATE: 20 BRPM | SYSTOLIC BLOOD PRESSURE: 158 MMHG | OXYGEN SATURATION: 95 % | BODY MASS INDEX: 26.23 KG/M2

## 2024-08-19 DIAGNOSIS — I10 ESSENTIAL HYPERTENSION: ICD-10-CM

## 2024-08-19 DIAGNOSIS — I71.43 INFRARENAL ABDOMINAL AORTIC ANEURYSM (AAA) WITHOUT RUPTURE (H): ICD-10-CM

## 2024-08-19 DIAGNOSIS — Z01.818 PREOP GENERAL PHYSICAL EXAM: Primary | ICD-10-CM

## 2024-08-19 DIAGNOSIS — Z78.9 STATIN INTOLERANCE: ICD-10-CM

## 2024-08-19 DIAGNOSIS — F17.200 TOBACCO USE DISORDER: ICD-10-CM

## 2024-08-19 DIAGNOSIS — J44.9 STAGE 3 SEVERE COPD BY GOLD CLASSIFICATION (H): ICD-10-CM

## 2024-08-19 DIAGNOSIS — E78.5 HYPERLIPIDEMIA, UNSPECIFIED HYPERLIPIDEMIA TYPE: ICD-10-CM

## 2024-08-19 DIAGNOSIS — Z71.6 ENCOUNTER FOR TOBACCO USE CESSATION COUNSELING: ICD-10-CM

## 2024-08-19 DIAGNOSIS — E11.9 TYPE 2 DIABETES MELLITUS WITHOUT COMPLICATION, WITHOUT LONG-TERM CURRENT USE OF INSULIN (H): ICD-10-CM

## 2024-08-19 LAB
ANION GAP SERPL CALCULATED.3IONS-SCNC: 13 MMOL/L (ref 7–15)
BASOPHILS # BLD AUTO: 0 10E3/UL (ref 0–0.2)
BASOPHILS NFR BLD AUTO: 0 %
BUN SERPL-MCNC: 22.8 MG/DL (ref 8–23)
CALCIUM SERPL-MCNC: 10.1 MG/DL (ref 8.8–10.4)
CHLORIDE SERPL-SCNC: 104 MMOL/L (ref 98–107)
CREAT SERPL-MCNC: 0.83 MG/DL (ref 0.67–1.17)
EGFRCR SERPLBLD CKD-EPI 2021: >90 ML/MIN/1.73M2
EOSINOPHIL # BLD AUTO: 0.3 10E3/UL (ref 0–0.7)
EOSINOPHIL NFR BLD AUTO: 3 %
ERYTHROCYTE [DISTWIDTH] IN BLOOD BY AUTOMATED COUNT: 13.5 % (ref 10–15)
GLUCOSE SERPL-MCNC: 114 MG/DL (ref 70–99)
HCO3 SERPL-SCNC: 24 MMOL/L (ref 22–29)
HCT VFR BLD AUTO: 45.4 % (ref 40–53)
HGB BLD-MCNC: 15.4 G/DL (ref 13.3–17.7)
IMM GRANULOCYTES # BLD: 0.1 10E3/UL
IMM GRANULOCYTES NFR BLD: 1 %
LYMPHOCYTES # BLD AUTO: 1.7 10E3/UL (ref 0.8–5.3)
LYMPHOCYTES NFR BLD AUTO: 18 %
MCH RBC QN AUTO: 31 PG (ref 26.5–33)
MCHC RBC AUTO-ENTMCNC: 33.9 G/DL (ref 31.5–36.5)
MCV RBC AUTO: 92 FL (ref 78–100)
MONOCYTES # BLD AUTO: 0.6 10E3/UL (ref 0–1.3)
MONOCYTES NFR BLD AUTO: 7 %
NEUTROPHILS # BLD AUTO: 6.4 10E3/UL (ref 1.6–8.3)
NEUTROPHILS NFR BLD AUTO: 71 %
NRBC # BLD AUTO: 0 10E3/UL
NRBC BLD AUTO-RTO: 0 /100
PLATELET # BLD AUTO: 181 10E3/UL (ref 150–450)
POTASSIUM SERPL-SCNC: 4.5 MMOL/L (ref 3.4–5.3)
RBC # BLD AUTO: 4.96 10E6/UL (ref 4.4–5.9)
SODIUM SERPL-SCNC: 141 MMOL/L (ref 135–145)
WBC # BLD AUTO: 9 10E3/UL (ref 4–11)

## 2024-08-19 PROCEDURE — 85025 COMPLETE CBC W/AUTO DIFF WBC: CPT | Performed by: FAMILY MEDICINE

## 2024-08-19 PROCEDURE — 80048 BASIC METABOLIC PNL TOTAL CA: CPT | Performed by: FAMILY MEDICINE

## 2024-08-19 PROCEDURE — G2211 COMPLEX E/M VISIT ADD ON: HCPCS | Performed by: FAMILY MEDICINE

## 2024-08-19 PROCEDURE — 36415 COLL VENOUS BLD VENIPUNCTURE: CPT | Performed by: FAMILY MEDICINE

## 2024-08-19 PROCEDURE — 99214 OFFICE O/P EST MOD 30 MIN: CPT | Performed by: FAMILY MEDICINE

## 2024-08-19 RX ORDER — CLONIDINE HYDROCHLORIDE 0.1 MG/1
0.1 TABLET ORAL 2 TIMES DAILY
Qty: 60 TABLET | Refills: 0 | Status: SHIPPED | OUTPATIENT
Start: 2024-08-19

## 2024-08-19 ASSESSMENT — PAIN SCALES - GENERAL: PAINLEVEL: NO PAIN (0)

## 2024-08-19 NOTE — PATIENT INSTRUCTIONS
How to Take Your Medication Before Surgery  Preoperative Medication Instructions   Antiplatelet or Anticoagulation Medication Instructions   - aspirin: Discontinue aspirin 7-10 days prior to procedure to reduce bleeding risk. It should be resumed postoperatively.     Additional Medication Instructions  Take all scheduled medications on the day of surgery EXCEPT for modifications listed below:   - Diuretics: hydrochlorothiazide -DO NOT TAKE on the day of surgery.   - Herbal medications and vitamins: DO NOT TAKE 14 days prior to surgery.   - ibuprofen (Advil, Motrin): DO NOT TAKE 1 day before surgery.     Add Clonidine 0.1 mg twice daily pill.  Recheck BP 7:30 Friday morning.  Avoid caffeine and salt.  Will notify of lab results.         Patient Education   Preparing for Your Surgery  Getting started  A nurse will call you to review your health history and instructions. They will give you an arrival time based on your scheduled surgery time. Please be ready to share:  Your doctor's clinic name and phone number  Your medical, surgical, and anesthesia history  A list of allergies and sensitivities  A list of medicines, including herbal treatments and over-the-counter drugs  Whether the patient has a legal guardian (ask how to send us the papers in advance)  Please tell us if you're pregnant--or if there's any chance you might be pregnant. Some surgeries may injure a fetus (unborn baby), so they require a pregnancy test. Surgeries that are safe for a fetus don't always need a test, and you can choose whether to have one.   If you have a child who's having surgery, please ask for a copy of Preparing for Your Child's Surgery.    Preparing for surgery  Within 10 to 30 days of surgery: Have a pre-op exam (sometimes called an H&P, or History and Physical). This can be done at a clinic or pre-operative center.  If you're having a , you may not need this exam. Talk to your care team.  At your pre-op exam, talk to your  care team about all medicines you take. If you need to stop any medicines before surgery, ask when to start taking them again.  We do this for your safety. Many medicines can make you bleed too much during surgery. Some change how well surgery (anesthesia) drugs work.  Call your insurance company to let them know you're having surgery. (If you don't have insurance, call [phone].)  Call your clinic if there's any change in your health. This includes signs of a cold or flu (sore throat, runny nose, cough, rash, fever). It also includes a scrape or scratch near the surgery site.  If you have questions on the day of surgery, call your hospital or surgery center.  Eating and drinking guidelines  For your safety: Unless your surgeon tells you otherwise, follow the guidelines below.  Eat and drink as usual until 8 hours before you arrive for surgery. After that, no food or milk.  Drink clear liquids until 2 hours before you arrive. These are liquids you can see through, like water, Gatorade, and Propel Water. They also include plain black coffee and tea (no cream or milk), candy, and breath mints. You can spit out gum when you arrive.  If you drink alcohol: Stop drinking it the night before surgery.  If your care team tells you to take medicine on the morning of surgery, it's okay to take it with a sip of water.  Preventing infection  Shower or bathe the night before and morning of your surgery. Follow the instructions your clinic gave you. (If no instructions, use regular soap.)  Don't shave or clip hair near your surgery site. We'll remove the hair if needed.  Don't smoke or vape the morning of surgery. You may chew nicotine gum up to 2 hours before surgery. A nicotine patch is okay.  Note: Some surgeries require you to completely quit smoking and nicotine. Check with your surgeon.  Your care team will make every effort to keep you safe from infection. We will:  Clean our hands often with soap and water (or an  alcohol-based hand rub).  Clean the skin at your surgery site with a special soap that kills germs.  Give you a special gown to keep you warm. (Cold raises the risk of infection.)  Wear special hair covers, masks, gowns and gloves during surgery.  Give antibiotic medicine, if prescribed. Not all surgeries need antibiotics.  What to bring on the day of surgery  Photo ID and insurance card  Copy of your health care directive, if you have one  Glasses and hearing aids (bring cases)  You can't wear contacts during surgery  Inhaler and eye drops, if you use them (tell us about these when you arrive)  CPAP machine or breathing device, if you use them  A few personal items, if spending the night  If you have . . .  A pacemaker, ICD (cardiac defibrillator) or other implant: Bring the ID card.  An implanted stimulator: Bring the remote control.  A legal guardian: Bring a copy of the certified (court-stamped) guardianship papers.  Please remove any jewelry, including body piercings. Leave jewelry and other valuables at home.  If you're going home the day of surgery  You must have a responsible adult drive you home. They should stay with you overnight as well.  If you don't have someone to stay with you, and you aren't safe to go home alone, we may keep you overnight. Insurance often won't pay for this.  After surgery  If it's hard to control your pain or you need more pain medicine, please call your surgeon's office.  Questions?   If you have any questions for your care team, list them here: _________________________________________________________________________________________________________________________________________________________________________ ____________________________________ ____________________________________ ____________________________________  For informational purposes only. Not to replace the advice of your health care provider. Copyright   2003, 2019 [facility]. All rights reserved.  Clinically reviewed by Emily Bower MD. [employer] [id number] - REV 12/22.
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
## 2024-08-20 ENCOUNTER — ALLIED HEALTH/NURSE VISIT (OUTPATIENT)
Dept: FAMILY MEDICINE | Facility: OTHER | Age: 59
End: 2024-08-20
Payer: COMMERCIAL

## 2024-08-20 VITALS — DIASTOLIC BLOOD PRESSURE: 80 MMHG | SYSTOLIC BLOOD PRESSURE: 126 MMHG

## 2024-08-20 DIAGNOSIS — I10 ESSENTIAL HYPERTENSION: Primary | ICD-10-CM

## 2024-08-20 NOTE — PROGRESS NOTES
Patient here for Blood pressure check due to hypertension.  First B/P check: 148/92  Re check 126 80 after 5 minutes.

## 2024-08-23 ENCOUNTER — ALLIED HEALTH/NURSE VISIT (OUTPATIENT)
Dept: FAMILY MEDICINE | Facility: OTHER | Age: 59
End: 2024-08-23
Attending: FAMILY MEDICINE
Payer: COMMERCIAL

## 2024-08-23 VITALS — SYSTOLIC BLOOD PRESSURE: 126 MMHG | DIASTOLIC BLOOD PRESSURE: 86 MMHG

## 2024-08-23 DIAGNOSIS — I10 ESSENTIAL HYPERTENSION: Primary | ICD-10-CM

## 2025-03-12 ENCOUNTER — APPOINTMENT (OUTPATIENT)
Dept: GENERAL RADIOLOGY | Facility: HOSPITAL | Age: 60
End: 2025-03-12
Attending: EMERGENCY MEDICINE
Payer: COMMERCIAL

## 2025-03-12 ENCOUNTER — HOSPITAL ENCOUNTER (INPATIENT)
Facility: HOSPITAL | Age: 60
LOS: 1 days | Discharge: HOME | End: 2025-03-13
Attending: EMERGENCY MEDICINE | Admitting: HOSPITALIST
Payer: COMMERCIAL

## 2025-03-12 ENCOUNTER — APPOINTMENT (OUTPATIENT)
Dept: CT IMAGING | Facility: HOSPITAL | Age: 60
End: 2025-03-12
Attending: EMERGENCY MEDICINE
Payer: COMMERCIAL

## 2025-03-12 DIAGNOSIS — J06.9 VIRAL URI: ICD-10-CM

## 2025-03-12 DIAGNOSIS — J44.1 COPD WITH ACUTE EXACERBATION (H): ICD-10-CM

## 2025-03-12 DIAGNOSIS — R09.02 HYPOXIA: ICD-10-CM

## 2025-03-12 DIAGNOSIS — E11.9 TYPE 2 DIABETES MELLITUS WITHOUT COMPLICATION, WITHOUT LONG-TERM CURRENT USE OF INSULIN (H): ICD-10-CM

## 2025-03-12 DIAGNOSIS — J44.1 CHRONIC OBSTRUCTIVE PULMONARY DISEASE WITH ACUTE EXACERBATION (H): ICD-10-CM

## 2025-03-12 DIAGNOSIS — J44.9 STAGE 3 SEVERE COPD BY GOLD CLASSIFICATION (H): ICD-10-CM

## 2025-03-12 LAB
ALBUMIN SERPL BCG-MCNC: 4.9 G/DL (ref 3.5–5.2)
ALP SERPL-CCNC: 87 U/L (ref 40–150)
ALT SERPL W P-5'-P-CCNC: 25 U/L (ref 0–70)
ANION GAP SERPL CALCULATED.3IONS-SCNC: 12 MMOL/L (ref 7–15)
AST SERPL W P-5'-P-CCNC: 25 U/L (ref 0–45)
ATRIAL RATE - MUSE: 61 BPM
BASE EXCESS BLDV CALC-SCNC: -1 MMOL/L (ref -3–3)
BASOPHILS # BLD AUTO: 0 10E3/UL (ref 0–0.2)
BASOPHILS NFR BLD AUTO: 1 %
BILIRUB DIRECT SERPL-MCNC: 0.19 MG/DL (ref 0–0.3)
BILIRUB SERPL-MCNC: 0.8 MG/DL
BUN SERPL-MCNC: 15.6 MG/DL (ref 8–23)
CALCIUM SERPL-MCNC: 9.7 MG/DL (ref 8.8–10.4)
CHLORIDE SERPL-SCNC: 100 MMOL/L (ref 98–107)
CREAT SERPL-MCNC: 0.86 MG/DL (ref 0.67–1.17)
CRP SERPL-MCNC: <3 MG/L
D DIMER PPP FEU-MCNC: 2.9 UG/ML FEU (ref 0–0.5)
DIASTOLIC BLOOD PRESSURE - MUSE: NORMAL MMHG
EGFRCR SERPLBLD CKD-EPI 2021: >90 ML/MIN/1.73M2
EOSINOPHIL # BLD AUTO: 0.6 10E3/UL (ref 0–0.7)
EOSINOPHIL NFR BLD AUTO: 8 %
ERYTHROCYTE [DISTWIDTH] IN BLOOD BY AUTOMATED COUNT: 13.8 % (ref 10–15)
FLUAV RNA SPEC QL NAA+PROBE: NEGATIVE
FLUBV RNA RESP QL NAA+PROBE: NEGATIVE
GLUCOSE SERPL-MCNC: 165 MG/DL (ref 70–99)
HCO3 BLDV-SCNC: 27 MMOL/L (ref 21–28)
HCO3 SERPL-SCNC: 25 MMOL/L (ref 22–29)
HCT VFR BLD AUTO: 47.6 % (ref 40–53)
HGB BLD-MCNC: 15.5 G/DL (ref 13.3–17.7)
HOLD SPECIMEN: NORMAL
IMM GRANULOCYTES # BLD: 0 10E3/UL
IMM GRANULOCYTES NFR BLD: 0 %
INTERPRETATION ECG - MUSE: NORMAL
LACTATE SERPL-SCNC: 0.8 MMOL/L (ref 0.7–2)
LYMPHOCYTES # BLD AUTO: 1.7 10E3/UL (ref 0.8–5.3)
LYMPHOCYTES NFR BLD AUTO: 23 %
MAGNESIUM SERPL-MCNC: 2.2 MG/DL (ref 1.7–2.3)
MCH RBC QN AUTO: 30.8 PG (ref 26.5–33)
MCHC RBC AUTO-ENTMCNC: 32.6 G/DL (ref 31.5–36.5)
MCV RBC AUTO: 95 FL (ref 78–100)
MONOCYTES # BLD AUTO: 0.5 10E3/UL (ref 0–1.3)
MONOCYTES NFR BLD AUTO: 6 %
NEUTROPHILS # BLD AUTO: 4.6 10E3/UL (ref 1.6–8.3)
NEUTROPHILS NFR BLD AUTO: 62 %
NRBC # BLD AUTO: 0 10E3/UL
NRBC BLD AUTO-RTO: 0 /100
O2/TOTAL GAS SETTING VFR VENT: 36 %
OXYHGB MFR BLDV: 64 % (ref 70–75)
P AXIS - MUSE: 82 DEGREES
PCO2 BLDV: 58 MM HG (ref 40–50)
PH BLDV: 7.28 [PH] (ref 7.32–7.43)
PLATELET # BLD AUTO: 188 10E3/UL (ref 150–450)
PO2 BLDV: 39 MM HG (ref 25–47)
POTASSIUM SERPL-SCNC: 4.4 MMOL/L (ref 3.4–5.3)
PR INTERVAL - MUSE: 196 MS
PROCALCITONIN SERPL IA-MCNC: 0.04 NG/ML
PROT SERPL-MCNC: 8.6 G/DL (ref 6.4–8.3)
QRS DURATION - MUSE: 92 MS
QT - MUSE: 420 MS
QTC - MUSE: 422 MS
R AXIS - MUSE: 75 DEGREES
RBC # BLD AUTO: 5.03 10E6/UL (ref 4.4–5.9)
RSV RNA SPEC NAA+PROBE: NEGATIVE
SAO2 % BLDV: 65.2 % (ref 70–75)
SARS-COV-2 RNA RESP QL NAA+PROBE: NEGATIVE
SODIUM SERPL-SCNC: 137 MMOL/L (ref 135–145)
SYSTOLIC BLOOD PRESSURE - MUSE: NORMAL MMHG
T AXIS - MUSE: 74 DEGREES
TROPONIN T SERPL HS-MCNC: 12 NG/L
TROPONIN T SERPL HS-MCNC: 17 NG/L
VENTRICULAR RATE- MUSE: 61 BPM
WBC # BLD AUTO: 7.4 10E3/UL (ref 4–11)

## 2025-03-12 PROCEDURE — 250N000013 HC RX MED GY IP 250 OP 250 PS 637: Performed by: INTERNAL MEDICINE

## 2025-03-12 PROCEDURE — 82805 BLOOD GASES W/O2 SATURATION: CPT | Performed by: EMERGENCY MEDICINE

## 2025-03-12 PROCEDURE — 96375 TX/PRO/DX INJ NEW DRUG ADDON: CPT | Mod: XU | Performed by: EMERGENCY MEDICINE

## 2025-03-12 PROCEDURE — 87637 SARSCOV2&INF A&B&RSV AMP PRB: CPT | Performed by: EMERGENCY MEDICINE

## 2025-03-12 PROCEDURE — 84484 ASSAY OF TROPONIN QUANT: CPT | Performed by: EMERGENCY MEDICINE

## 2025-03-12 PROCEDURE — 120N000001 HC R&B MED SURG/OB

## 2025-03-12 PROCEDURE — 96375 TX/PRO/DX INJ NEW DRUG ADDON: CPT

## 2025-03-12 PROCEDURE — 82040 ASSAY OF SERUM ALBUMIN: CPT | Performed by: EMERGENCY MEDICINE

## 2025-03-12 PROCEDURE — 250N000009 HC RX 250: Performed by: EMERGENCY MEDICINE

## 2025-03-12 PROCEDURE — 83605 ASSAY OF LACTIC ACID: CPT | Performed by: EMERGENCY MEDICINE

## 2025-03-12 PROCEDURE — 250N000011 HC RX IP 250 OP 636: Performed by: EMERGENCY MEDICINE

## 2025-03-12 PROCEDURE — 94640 AIRWAY INHALATION TREATMENT: CPT | Mod: 76

## 2025-03-12 PROCEDURE — 82247 BILIRUBIN TOTAL: CPT | Performed by: EMERGENCY MEDICINE

## 2025-03-12 PROCEDURE — 93005 ELECTROCARDIOGRAM TRACING: CPT | Performed by: EMERGENCY MEDICINE

## 2025-03-12 PROCEDURE — 84145 PROCALCITONIN (PCT): CPT | Performed by: EMERGENCY MEDICINE

## 2025-03-12 PROCEDURE — 93010 ELECTROCARDIOGRAM REPORT: CPT | Performed by: INTERNAL MEDICINE

## 2025-03-12 PROCEDURE — 94640 AIRWAY INHALATION TREATMENT: CPT

## 2025-03-12 PROCEDURE — 99222 1ST HOSP IP/OBS MODERATE 55: CPT | Mod: AI | Performed by: INTERNAL MEDICINE

## 2025-03-12 PROCEDURE — 36592 COLLECT BLOOD FROM PICC: CPT | Performed by: EMERGENCY MEDICINE

## 2025-03-12 PROCEDURE — 85004 AUTOMATED DIFF WBC COUNT: CPT | Performed by: EMERGENCY MEDICINE

## 2025-03-12 PROCEDURE — 250N000009 HC RX 250: Performed by: INTERNAL MEDICINE

## 2025-03-12 PROCEDURE — 96376 TX/PRO/DX INJ SAME DRUG ADON: CPT | Mod: XU

## 2025-03-12 PROCEDURE — 96365 THER/PROPH/DIAG IV INF INIT: CPT | Mod: XU | Performed by: EMERGENCY MEDICINE

## 2025-03-12 PROCEDURE — 250N000011 HC RX IP 250 OP 636: Performed by: RADIOLOGY

## 2025-03-12 PROCEDURE — 999N000157 HC STATISTIC RCP TIME EA 10 MIN

## 2025-03-12 PROCEDURE — 80053 COMPREHEN METABOLIC PANEL: CPT | Performed by: EMERGENCY MEDICINE

## 2025-03-12 PROCEDURE — 71046 X-RAY EXAM CHEST 2 VIEWS: CPT

## 2025-03-12 PROCEDURE — 86140 C-REACTIVE PROTEIN: CPT | Performed by: EMERGENCY MEDICINE

## 2025-03-12 PROCEDURE — 83735 ASSAY OF MAGNESIUM: CPT | Performed by: EMERGENCY MEDICINE

## 2025-03-12 PROCEDURE — G0378 HOSPITAL OBSERVATION PER HR: HCPCS

## 2025-03-12 PROCEDURE — 250N000011 HC RX IP 250 OP 636: Performed by: INTERNAL MEDICINE

## 2025-03-12 PROCEDURE — 99285 EMERGENCY DEPT VISIT HI MDM: CPT | Performed by: EMERGENCY MEDICINE

## 2025-03-12 PROCEDURE — 99285 EMERGENCY DEPT VISIT HI MDM: CPT | Mod: 25 | Performed by: EMERGENCY MEDICINE

## 2025-03-12 PROCEDURE — 80048 BASIC METABOLIC PNL TOTAL CA: CPT | Performed by: EMERGENCY MEDICINE

## 2025-03-12 PROCEDURE — 36415 COLL VENOUS BLD VENIPUNCTURE: CPT | Performed by: EMERGENCY MEDICINE

## 2025-03-12 PROCEDURE — 71275 CT ANGIOGRAPHY CHEST: CPT

## 2025-03-12 PROCEDURE — 85379 FIBRIN DEGRADATION QUANT: CPT | Performed by: EMERGENCY MEDICINE

## 2025-03-12 RX ORDER — METHYLPREDNISOLONE SODIUM SUCCINATE 125 MG/2ML
60 INJECTION INTRAMUSCULAR; INTRAVENOUS EVERY 6 HOURS
Status: COMPLETED | OUTPATIENT
Start: 2025-03-12 | End: 2025-03-12

## 2025-03-12 RX ORDER — CEFTRIAXONE SODIUM 1 G/50ML
1 INJECTION, SOLUTION INTRAVENOUS ONCE
Status: COMPLETED | OUTPATIENT
Start: 2025-03-12 | End: 2025-03-12

## 2025-03-12 RX ORDER — ACETAMINOPHEN 650 MG/1
650 SUPPOSITORY RECTAL EVERY 4 HOURS PRN
Status: DISCONTINUED | OUTPATIENT
Start: 2025-03-12 | End: 2025-03-13 | Stop reason: HOSPADM

## 2025-03-12 RX ORDER — ONDANSETRON 2 MG/ML
4 INJECTION INTRAMUSCULAR; INTRAVENOUS EVERY 6 HOURS PRN
Status: DISCONTINUED | OUTPATIENT
Start: 2025-03-12 | End: 2025-03-13 | Stop reason: HOSPADM

## 2025-03-12 RX ORDER — ALBUTEROL SULFATE 0.83 MG/ML
2.5 SOLUTION RESPIRATORY (INHALATION)
Status: COMPLETED | OUTPATIENT
Start: 2025-03-12 | End: 2025-03-12

## 2025-03-12 RX ORDER — DOXYCYCLINE 100 MG/10ML
100 INJECTION, POWDER, LYOPHILIZED, FOR SOLUTION INTRAVENOUS EVERY 12 HOURS
Status: DISCONTINUED | OUTPATIENT
Start: 2025-03-12 | End: 2025-03-13 | Stop reason: HOSPADM

## 2025-03-12 RX ORDER — ONDANSETRON 4 MG/1
4 TABLET, ORALLY DISINTEGRATING ORAL EVERY 6 HOURS PRN
Status: DISCONTINUED | OUTPATIENT
Start: 2025-03-12 | End: 2025-03-13 | Stop reason: HOSPADM

## 2025-03-12 RX ORDER — LIDOCAINE 40 MG/G
CREAM TOPICAL
Status: DISCONTINUED | OUTPATIENT
Start: 2025-03-12 | End: 2025-03-13 | Stop reason: HOSPADM

## 2025-03-12 RX ORDER — AMLODIPINE BESYLATE 5 MG/1
10 TABLET ORAL DAILY
Status: DISCONTINUED | OUTPATIENT
Start: 2025-03-13 | End: 2025-03-13 | Stop reason: HOSPADM

## 2025-03-12 RX ORDER — CLONIDINE 0.3 MG/24H
1 PATCH, EXTENDED RELEASE TRANSDERMAL WEEKLY
Status: DISCONTINUED | OUTPATIENT
Start: 2025-03-16 | End: 2025-03-13 | Stop reason: HOSPADM

## 2025-03-12 RX ORDER — ACETAMINOPHEN 325 MG/1
650 TABLET ORAL EVERY 4 HOURS PRN
Status: DISCONTINUED | OUTPATIENT
Start: 2025-03-12 | End: 2025-03-13 | Stop reason: HOSPADM

## 2025-03-12 RX ORDER — IPRATROPIUM BROMIDE AND ALBUTEROL SULFATE 2.5; .5 MG/3ML; MG/3ML
3 SOLUTION RESPIRATORY (INHALATION) ONCE
Status: COMPLETED | OUTPATIENT
Start: 2025-03-12 | End: 2025-03-12

## 2025-03-12 RX ORDER — AMOXICILLIN 250 MG
1 CAPSULE ORAL 2 TIMES DAILY PRN
Status: DISCONTINUED | OUTPATIENT
Start: 2025-03-12 | End: 2025-03-13 | Stop reason: HOSPADM

## 2025-03-12 RX ORDER — CALCIUM CARBONATE 500 MG/1
1000 TABLET, CHEWABLE ORAL 4 TIMES DAILY PRN
Status: DISCONTINUED | OUTPATIENT
Start: 2025-03-12 | End: 2025-03-13 | Stop reason: HOSPADM

## 2025-03-12 RX ORDER — PREDNISONE 20 MG/1
40 TABLET ORAL DAILY
Status: DISCONTINUED | OUTPATIENT
Start: 2025-03-13 | End: 2025-03-13 | Stop reason: HOSPADM

## 2025-03-12 RX ORDER — CARVEDILOL 3.12 MG/1
6.25 TABLET ORAL 2 TIMES DAILY WITH MEALS
Status: DISCONTINUED | OUTPATIENT
Start: 2025-03-12 | End: 2025-03-13 | Stop reason: HOSPADM

## 2025-03-12 RX ORDER — IOPAMIDOL 755 MG/ML
64 INJECTION, SOLUTION INTRAVASCULAR ONCE
Status: COMPLETED | OUTPATIENT
Start: 2025-03-12 | End: 2025-03-12

## 2025-03-12 RX ORDER — AMOXICILLIN 250 MG
2 CAPSULE ORAL 2 TIMES DAILY PRN
Status: DISCONTINUED | OUTPATIENT
Start: 2025-03-12 | End: 2025-03-13 | Stop reason: HOSPADM

## 2025-03-12 RX ORDER — IPRATROPIUM BROMIDE AND ALBUTEROL SULFATE 2.5; .5 MG/3ML; MG/3ML
3 SOLUTION RESPIRATORY (INHALATION) EVERY 4 HOURS PRN
Status: DISCONTINUED | OUTPATIENT
Start: 2025-03-12 | End: 2025-03-13 | Stop reason: HOSPADM

## 2025-03-12 RX ORDER — ALBUTEROL SULFATE 0.83 MG/ML
2.5 SOLUTION RESPIRATORY (INHALATION)
Status: DISCONTINUED | OUTPATIENT
Start: 2025-03-12 | End: 2025-03-12

## 2025-03-12 RX ORDER — ENOXAPARIN SODIUM 100 MG/ML
40 INJECTION SUBCUTANEOUS EVERY 24 HOURS
Status: DISCONTINUED | OUTPATIENT
Start: 2025-03-12 | End: 2025-03-13 | Stop reason: HOSPADM

## 2025-03-12 RX ORDER — IPRATROPIUM BROMIDE AND ALBUTEROL SULFATE 2.5; .5 MG/3ML; MG/3ML
SOLUTION RESPIRATORY (INHALATION)
Status: COMPLETED
Start: 2025-03-12 | End: 2025-03-12

## 2025-03-12 RX ADMIN — METHYLPREDNISOLONE SODIUM SUCCINATE 62.5 MG: 125 INJECTION, POWDER, FOR SOLUTION INTRAMUSCULAR; INTRAVENOUS at 19:54

## 2025-03-12 RX ADMIN — ENOXAPARIN SODIUM 40 MG: 40 INJECTION SUBCUTANEOUS at 15:02

## 2025-03-12 RX ADMIN — ALBUTEROL SULFATE 2.5 MG: 2.5 SOLUTION RESPIRATORY (INHALATION) at 12:26

## 2025-03-12 RX ADMIN — IPRATROPIUM BROMIDE AND ALBUTEROL SULFATE 3 ML: .5; 3 SOLUTION RESPIRATORY (INHALATION) at 17:46

## 2025-03-12 RX ADMIN — IPRATROPIUM BROMIDE AND ALBUTEROL SULFATE 3 ML: .5; 3 SOLUTION RESPIRATORY (INHALATION) at 23:12

## 2025-03-12 RX ADMIN — METHYLPREDNISOLONE SODIUM SUCCINATE 62.5 MG: 125 INJECTION, POWDER, FOR SOLUTION INTRAMUSCULAR; INTRAVENOUS at 14:58

## 2025-03-12 RX ADMIN — ALBUTEROL SULFATE 2.5 MG: 2.5 SOLUTION RESPIRATORY (INHALATION) at 12:13

## 2025-03-12 RX ADMIN — DOXYCYCLINE 100 MG: 100 INJECTION, POWDER, LYOPHILIZED, FOR SOLUTION INTRAVENOUS at 12:58

## 2025-03-12 RX ADMIN — IPRATROPIUM BROMIDE AND ALBUTEROL SULFATE 3 ML: .5; 3 SOLUTION RESPIRATORY (INHALATION) at 09:09

## 2025-03-12 RX ADMIN — CARVEDILOL 6.25 MG: 3.12 TABLET, FILM COATED ORAL at 17:15

## 2025-03-12 RX ADMIN — CEFTRIAXONE SODIUM 1 G: 1 INJECTION, SOLUTION INTRAVENOUS at 12:24

## 2025-03-12 RX ADMIN — IOPAMIDOL 64 ML: 755 INJECTION, SOLUTION INTRAVENOUS at 13:21

## 2025-03-12 RX ADMIN — DOXYCYCLINE 100 MG: 100 INJECTION, POWDER, LYOPHILIZED, FOR SOLUTION INTRAVENOUS at 23:26

## 2025-03-12 RX ADMIN — ALBUTEROL SULFATE 2.5 MG: 2.5 SOLUTION RESPIRATORY (INHALATION) at 12:27

## 2025-03-12 RX ADMIN — IPRATROPIUM BROMIDE AND ALBUTEROL SULFATE 3 ML: .5; 3 SOLUTION RESPIRATORY (INHALATION) at 20:23

## 2025-03-12 ASSESSMENT — ACTIVITIES OF DAILY LIVING (ADL)
ADLS_ACUITY_SCORE: 28
ADLS_ACUITY_SCORE: 50
ADLS_ACUITY_SCORE: 28
ADLS_ACUITY_SCORE: 50
ADLS_ACUITY_SCORE: 50
ADLS_ACUITY_SCORE: 47
ADLS_ACUITY_SCORE: 28
ADLS_ACUITY_SCORE: 50
ADLS_ACUITY_SCORE: 50

## 2025-03-12 ASSESSMENT — COLUMBIA-SUICIDE SEVERITY RATING SCALE - C-SSRS
2. HAVE YOU ACTUALLY HAD ANY THOUGHTS OF KILLING YOURSELF IN THE PAST MONTH?: NO
1. IN THE PAST MONTH, HAVE YOU WISHED YOU WERE DEAD OR WISHED YOU COULD GO TO SLEEP AND NOT WAKE UP?: NO
6. HAVE YOU EVER DONE ANYTHING, STARTED TO DO ANYTHING, OR PREPARED TO DO ANYTHING TO END YOUR LIFE?: NO

## 2025-03-12 NOTE — PLAN OF CARE
"Plan of Care Reviewed With: patient    Overall Patient Progress: progression with O2 need    /77   Pulse 79   Temp 97.5  F (36.4  C) (Tympanic)   Resp 22   Ht 1.854 m (6' 1\")   Wt 82 kg (180 lb 12.4 oz)   SpO2 96%   BMI 23.85 kg/m      A&O, VS as charted, afebrile, denies pain, PIV SL, originally on 1L NC upon arrival from ED did have to turn upto 2L due to O2 sats dropping into 87-89%, able to titrate back down to RA, lungs dim/exp wheeze as charted, received neb x1, infreq nonproductive cough, telemetry SR, voiding spontaneously, no BM, SBA standing at bedside, denies dizziness/lightheadedness, dressing applied to R buttocks due to sore from  per Pt, CDI, good appetite, free from falls, call light within reach, makes needs known.     Face to face report given with opportunity to observe patient.    Report given to Jayme Rodriguez RN   3/12/2025  7:12 PM       "

## 2025-03-12 NOTE — ED PROVIDER NOTES
"  History     Chief Complaint   Patient presents with    Shortness of Breath     HPI  Ry Man is a 59 year old male who presents to the ED with dyspnea.  He was recently seen March 6 in [place].  Had not started any treatment after that visit.  He was seen for shortness of breath possible carbon monoxide poisoning, carbon monoxide level was unremarkable.  Past 2 to 3 days has had increased dyspnea.  He stated it took him 45 minutes to go up a set of stairs to get to his car yesterday to leave work.  He took a doxycycline last night that he had leftover.  He had difficulty sleeping during the night.  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  No fever headache myalgias.  Otherwise asymptomatic.  Denies any chest pain.  Patient usually has oxygen saturation he notes at 94% and he was in the mid 80s when EMS arrived on room air.  He does not have home O2.  He does do nebulizer in the morning in the evening when he returns home from work.    Allergies:  Allergies   Allergen Reactions    Homeopathic Products      \"Ethyl Alcohol\"    Lisinopril Other (See Comments)     Cough      Valsartan Rash and GI Disturbance     Jena       Problem List:    Patient Active Problem List    Diagnosis Date Noted    Stage 3 severe COPD by GOLD classification (H) 05/24/2023     Priority: Medium    Diabetes mellitus, type 2 (H) 05/02/2023     Priority: Medium    Hyperlipidemia 03/12/2020     Priority: Medium    Acute respiratory failure with hypoxia and hypercapnia (H) 02/27/2020     Priority: Medium    Acute pulmonary embolism (H) 02/27/2020     Priority: Medium    Chronic obstructive pulmonary disease with acute exacerbation (H) 02/18/2020     Priority: Medium    Essential hypertension 02/15/2020     Priority: Medium    Respiratory failure (H) 02/15/2020     Priority: Medium    Hyperuricemia 04/07/2015     Priority: Medium    ED (erectile dysfunction) " 2015     Priority: Medium    Tobacco abuse 2014     Priority: Medium        Past Medical History:    Past Medical History:   Diagnosis Date    Abnormal liver function test 2011    Bronchitis, not specified as acute or chronic 2005    Osteoarthritis 2011    Prediabetes     Tobacco abuse 2011    Unspecified essential hypertension 2006       Past Surgical History:    Past Surgical History:   Procedure Laterality Date    cysts removed from neck      Bilateral    vasectomy      wisdom teeth extraction         Family History:    Family History   Problem Relation Age of Onset    Heart Failure Father 72        CHF, cause of death    Diabetes Father     C.A.D. Mother     Diabetes Mother     C.A.D. Brother 51        Cause of death    Diabetes Brother     Hypertension Brother     Other - See Comments Brother         Multiple Sclerosis       Social History:  Marital Status:   [2]  Social History     Tobacco Use    Smoking status: Former     Current packs/day: 0.00     Average packs/day: 1 pack/day for 40.1 years (40.1 ttl pk-yrs)     Types: Cigarettes     Start date: 1980     Quit date: 2020     Years since quittin.1     Passive exposure: Past    Smokeless tobacco: Never    Tobacco comments:     pt quit 20   Vaping Use    Vaping status: Never Used   Substance Use Topics    Alcohol use: No    Drug use: No        Medications:    albuterol (PROAIR HFA/PROVENTIL HFA/VENTOLIN HFA) 108 (90 Base) MCG/ACT inhaler  albuterol (PROVENTIL) (2.5 MG/3ML) 0.083% neb solution  amLODIPine (NORVASC) 10 MG tablet  ascorbic acid 1000 MG TABS tablet  ASMANEX  MCG/ACT inhaler  Bacillus Coagulans-Inulin (PROBIOTIC) 1-250 BILLION-MG CAPS  carvedilol (COREG) 12.5 MG tablet  cloNIDine (CATAPRES) 0.1 MG tablet  cloNIDine (CATAPRES-TTS3) 0.3 MG/24HR WK patch  ezetimibe (ZETIA) 10 MG tablet  Glucosamine-Chondroit-Vit C-Mn (GLUCOSAMINE CHONDROITIN 1500 COMPLEX) CAPS  hydrochlorothiazide  (HYDRODIURIL) 25 MG tablet  Plant Sterols and Stanols 450 MG TABS  STIOLTO RESPIMAT 2.5-2.5 MCG/ACT AERS          Review of Systems   All other systems reviewed and are negative.      Physical Exam   BP: (!) 172/115  Pulse: 82  Temp: 97.2  F (36.2  C)  Resp: 28  Weight: 83.9 kg (185 lb)  SpO2: 96 %      Physical Exam  Vitals and nursing note reviewed.   Constitutional:       General: He is not in acute distress.     Appearance: Normal appearance. He is not ill-appearing, toxic-appearing or diaphoretic.   HENT:      Head: Normocephalic and atraumatic.      Mouth/Throat:      Mouth: Mucous membranes are moist.      Pharynx: Oropharynx is clear.   Eyes:      General: No scleral icterus.     Extraocular Movements: Extraocular movements intact.      Conjunctiva/sclera: Conjunctivae normal.   Cardiovascular:      Rate and Rhythm: Normal rate and regular rhythm.      Heart sounds: Normal heart sounds.   Pulmonary:      Effort: Tachypnea, accessory muscle usage and respiratory distress present.      Breath sounds: Examination of the right-upper field reveals decreased breath sounds and wheezing. Examination of the left-upper field reveals wheezing. Examination of the right-middle field reveals decreased breath sounds and wheezing. Examination of the left-middle field reveals wheezing. Examination of the right-lower field reveals wheezing. Examination of the left-lower field reveals wheezing. Decreased breath sounds and wheezing present. No rhonchi or rales.      Comments: Patient had decreased aeration right anterior lung fields diffuse expiratory wheezes scattered throughout with prolonged expiration  Abdominal:      General: Bowel sounds are normal.      Palpations: Abdomen is soft.      Tenderness: There is no abdominal tenderness.   Musculoskeletal:         General: No deformity.      Cervical back: Normal range of motion and neck supple.      Right lower leg: No edema.      Left lower leg: No edema.   Skin:      General: Skin is warm and dry.      Capillary Refill: Capillary refill takes less than 2 seconds.   Neurological:      General: No focal deficit present.      Mental Status: He is alert and oriented to person, place, and time.   Psychiatric:         Mood and Affect: Mood normal.         Behavior: Behavior normal.         ED Course     ED Course as of 03/12/25 1237   Wed Mar 12, 2025   1206 Patient was able to be titrated down to room air, he had to stand up to wait and sit back in bed he became very tachypneic O2 sats were in the 70s on room air had to be placed back on 3 L to get his O2 sats above 90% his lungs have poor aeration he has expiratory wheezes scattered throughout repeat albuterol nebulizers are ordered once he is more stable respiratory wise we will have CTA performed.  He does have a history of PE in the past and concerned that he may have both COPD exacerbation and PE.  He has had a productive cough chest x-ray was negative for infiltrates but will cover for bronchitis with bronchospasm with Rocephin and doxycycline.  Appears patient will require admission discussed with patient   1222 Hospitalist paged to consider candidacy for admission   1237 Discussed case with Dr. Sinha patient will be admitted for further care pending CTA chest     Procedures              Critical Care time:  was 30 minutes for this patient excluding procedures.  EKG was done at 0 931 reviewed at 0 931 agree with below interpretation normal sinus rhythm normal EKG  Results for orders placed or performed during the hospital encounter of 03/12/25 (from the past 24 hours)   [place] Draw    Narrative    The following orders were created for panel order [place] Draw.  Procedure                               Abnormality         Status                     ---------                               -----------         ------                     Extra Blue Top Tube[[id number]]                             Final result                Extra Red Top Tube[[id number]]                              Final result               Extra Green Top (Lithiu...[[id number]]                      Final result               Extra Green Top (Lithiu...[[id number]]                      Final result               Extra Purple Top Tube[[id number]]                           Final result                 Please view results for these tests on the individual orders.   Extra Blue Top Tube   Result Value Ref Range    Hold Specimen JIC    Extra Red Top Tube   Result Value Ref Range    Hold Specimen JIC    Extra Green Top (Lithium Heparin) Tube   Result Value Ref Range    Hold Specimen JIC    Extra Green Top (Lithium Heparin) Tube   Result Value Ref Range    Hold Specimen JIC    Extra Purple Top Tube   Result Value Ref Range    Hold Specimen JIC    CBC with Platelets & Differential    Narrative    The following orders were created for panel order CBC with Platelets & Differential.  Procedure                               Abnormality         Status                     ---------                               -----------         ------                     CBC with platelets and ...[[id number]]                      Final result                 Please view results for these tests on the individual orders.   Basic metabolic panel   Result Value Ref Range    Sodium 137 135 - 145 mmol/L    Potassium 4.4 3.4 - 5.3 mmol/L    Chloride 100 98 - 107 mmol/L    Carbon Dioxide (CO2) 25 22 - 29 mmol/L    Anion Gap 12 7 - 15 mmol/L    Urea Nitrogen 15.6 8.0 - 23.0 mg/dL    Creatinine 0.86 0.67 - 1.17 mg/dL    GFR Estimate >90 >60 mL/min/1.73m2    Calcium 9.7 8.8 - 10.4 mg/dL    Glucose 165 (H) 70 - 99 mg/dL   CBC with platelets and differential   Result Value Ref Range    WBC Count 7.4 4.0 - 11.0 10e3/uL    RBC Count 5.03 4.40 - 5.90 10e6/uL    Hemoglobin 15.5 13.3 - 17.7 g/dL    Hematocrit 47.6 40.0 - 53.0 %    MCV 95 78 - 100 fL    MCH 30.8 26.5 - 33.0 pg    MCHC 32.6 31.5 - 36.5 g/dL    RDW  13.8 10.0 - 15.0 %    Platelet Count 188 150 - 450 10e3/uL    % Neutrophils 62 %    % Lymphocytes 23 %    % Monocytes 6 %    % Eosinophils 8 %    % Basophils 1 %    % Immature Granulocytes 0 %    NRBCs per 100 WBC 0 <1 /100    Absolute Neutrophils 4.6 1.6 - 8.3 10e3/uL    Absolute Lymphocytes 1.7 0.8 - 5.3 10e3/uL    Absolute Monocytes 0.5 0.0 - 1.3 10e3/uL    Absolute Eosinophils 0.6 0.0 - 0.7 10e3/uL    Absolute Basophils 0.0 0.0 - 0.2 10e3/uL    Absolute Immature Granulocytes 0.0 <=0.4 10e3/uL    Absolute NRBCs 0.0 10e3/uL   Hepatic function panel   Result Value Ref Range    Protein Total 8.6 (H) 6.4 - 8.3 g/dL    Albumin 4.9 3.5 - 5.2 g/dL    Bilirubin Total 0.8 <=1.2 mg/dL    Alkaline Phosphatase 87 40 - 150 U/L    AST 25 0 - 45 U/L    ALT 25 0 - 70 U/L    Bilirubin Direct 0.19 0.00 - 0.30 mg/dL   D dimer quantitative   Result Value Ref Range    D-Dimer Quantitative 2.90 (H) 0.00 - 0.50 ug/mL FEU    Narrative    This D-dimer assay is intended for use in conjunction with a clinical pretest probability assessment model to exclude pulmonary embolism (PE) and deep venous thrombosis (DVT) in outpatients suspected of PE or DVT. The cut-off value is 0.50 ug/mL FEU.    For patients 50 years of age or older, the application of age-adjusted cut-off values for D-Dimer may increase the specificity without significant effect on sensitivity. The literature suggested calculation age adjusted cut-off in ug/L = age in years x 10 ug/L. The results in this laboratory are reported as ug/mL rather than ug/L. The calculation for age adjusted cut off in ug/mL= age in years x 0.01 ug/mL. For example, the cut off for a 76 year old male is 74 x 0.01 ug/mL = 0.76 ug/mL (760 ug/L).    SHARA Reyes et al. Age adjusted D-dimer cut-off levels to rule out pulmonary embolism: The ADJUST-PE Study. BESSIE 2014;311:5233-0567.; HJ Jordyn et al. Diagnostic accuracy of conventional or age adjusted D-dimer cutoff values in older patients with suspected  venous thromboembolism. Systemic review and meta-analysis. BMJ 2013:346:f2492.   Lactic acid whole blood with 1x repeat in 2 hr when >2   Result Value Ref Range    Lactic Acid, Initial 0.8 0.7 - 2.0 mmol/L   Procalcitonin   Result Value Ref Range    Procalcitonin 0.04 <0.50 ng/mL   Troponin T, High Sensitivity   Result Value Ref Range    Troponin T, High Sensitivity 12 <=22 ng/L   Magnesium   Result Value Ref Range    Magnesium 2.2 1.7 - 2.3 mg/dL   Blood gas venous   Result Value Ref Range    pH Venous 7.28 (L) 7.32 - 7.43    pCO2 Venous 58 (H) 40 - 50 mm Hg    pO2 Venous 39 25 - 47 mm Hg    Bicarbonate Venous 27 21 - 28 mmol/L    Base Excess/Deficit Venous -1.0 -3.0 - 3.0 mmol/L    FIO2 36     Oxyhemoglobin Venous 64 (L) 70 - 75 %    O2 Sat, Venous 65.2 (L) 70.0 - 75.0 %    Narrative    In healthy individuals, oxyhemoglobin (O2Hb) and oxygen saturation (SO2) are approximately equal. In the presence of dyshemoglobins, oxyhemoglobin can be considerably lower than oxygen saturation.   CRP inflammation   Result Value Ref Range    CRP Inflammation <3.00 <5.00 mg/L   Influenza A/B, RSV and SARS-CoV2 PCR (COVID-19) Nasopharyngeal    Specimen: Nasopharyngeal; Swab   Result Value Ref Range    Influenza A PCR Negative Negative    Influenza B PCR Negative Negative    RSV PCR Negative Negative    SARS CoV2 PCR Negative Negative    Narrative    Testing was performed using the Xpert Xpress CoV2/Flu/RSV Assay on the [employer] GeneXpert Instrument. This test should be ordered for the detection of SARS-CoV2, influenza, and RSV viruses in individuals with signs and symptoms of respiratory tract infection. This test is for in vitro diagnostic use under the [place] FDA for laboratories certified under CLIA to perform high or moderate complexity testing. This test has been [place] FDA cleared. A negative result does not rule out the presence of PCR inhibitors in the specimen or target RNA in concentration below the limit of detection for the  assay. If only one viral target is positive but coinfection with multiple targets is suspected, the sample should be re-tested with another FDA cleared, approved, or authorized test, if coninfection would change clinical management. This test was validated by the [facility]. These laboratories are certified under the Clinical Laboratory Improvement Amendments of 1988 (CLIA-88) as qualified to perfom high complexity laboratory testing.   XR Chest 2 Views    Narrative    PROCEDURE:  XR CHEST 2 VIEWS    HISTORY: dyspnea, .    COMPARISON:  6/12/24    FINDINGS:  The cardiomediastinal contours are stable. The trachea is midline.  There is calcific aortic atherosclerosis.  No focal consolidation, effusion or pneumothorax. Scarring at the left  lung base is chronic.  No suspicious osseous lesion or subdiaphragmatic free air.      Impression    IMPRESSION:      No new consolidation.    ADINA FORMAN MD         SYSTEM ID:  [id number]   EKG 12-lead, tracing only   Result Value Ref Range    Systolic Blood Pressure  mmHg    Diastolic Blood Pressure  mmHg    Ventricular Rate 61 BPM    Atrial Rate 61 BPM    UT Interval 196 ms    QRS Duration 92 ms     ms    QTc 422 ms    P Axis 82 degrees    R AXIS 75 degrees    T Axis 74 degrees    Interpretation ECG       Sinus rhythm  Normal ECG  When compared with ECG of 12-Jun-2024 11:43,  No significant change was found     Troponin T, High Sensitivity   Result Value Ref Range    Troponin T, High Sensitivity 17 <=22 ng/L       Medications   albuterol (PROVENTIL) neb solution 2.5 mg (2.5 mg Nebulization $Given 3/12/25 1213)   cefTRIAXone in d5w (ROCEPHIN) intermittent infusion 1 g (has no administration in time range)   doxycycline (VIBRAMYCIN) 100 mg vial to attach to  mL bag (has no administration in time range)   ipratropium - albuterol 0.5 mg/2.5 mg/3 mL (DUONEB) neb solution 3 mL (3 mLs Nebulization $Given 3/12/25 0909)       Assessments & Plan (with  Medical Decision Making)     I have reviewed the nursing notes.    I have reviewed the findings, diagnosis, plan and need for follow up with the patient.           Medical Decision Making  The patient's presentation was of high complexity (an acute health issue posing potential threat to life or bodily function).    The patient's evaluation involved:  ordering and/or review of 3+ test(s) in this encounter (EKG chest x-ray multiple lab test)    The patient's management necessitated high risk (a decision regarding hospitalization).    The patient is a 59 year old year old male with a past medical history as above of who presents to the ED with a complaint of shortness of breath cough nasal congestion.  History was obtained from the patient and EMS.  Presentation combined with history increase my concerns for pneumonia COPD exacerbation, COVID, influenza, RSV, pneumothorax, pulmonary embolism.  It was decided necessary to order ED investigations in order to properly assess patient's acute emergent complaint.  My independent interpretation of revealed and is in agreement with radiology interpretation showing chest x-ray showed no acute infiltrate.  My independent review of EKG reveals no acute findings.  ED labs reveal see above serial troponins were negative D-dimer is elevated.  After prolonged ED observation interpretation of vital signs history physical ED studies feel the patient has COPD exacerbation with hypoxia.  Shared decision-making was discussed in layman terms with the patient or caregiver and they agree with plan.  Patient be admitted for further care discussed case with Dr. Sinha  New Prescriptions    No medications on file       Final diagnoses:   COPD with acute exacerbation (H)   Viral URI   Hypoxia       3/12/2025   [facility]       Caesar Rodríguez MD  03/12/25 0147

## 2025-03-12 NOTE — H&P
[facility]    History and Physical - Hospitalist Service       Date of Admission:  3/12/2025    Assessment & Plan      Ry Man is a 59 year old male admitted on 3/12/2025.      1.  Acute respiratory failure with hypoxia: Patient presented with over a week or so of increasing shortness of breath.  With intermittent hypoxia.  Came in rather hypoxic at this ER visit.  Responded with O2 supplementation.  No evidence of pulmonary embolism.  No obvious signs of acute infiltrate.  Looking at this likely being a.  COPD exacerbation secondary to possible viral illness but not clear.  He is improved already is on 1 L of O2 with sats rate around 90%.  Will treat him empirically for COPD exacerbation bronchodilators and steroids titrate O2 as feasible.    2.  Acute exacerbation of COPD: Does have history of fairly severe COPD.  He is on bronchodilators.  Not generally on oxygen.  No evidence of any PE or acute infiltrates on CT scan.  White counts normal procalcitonin normal CRP is normal.  Will treat him with a dose of Solu-Medrol couple doses IV here today's prednisone tomorrow continue with the bronchodilators albuterol/DuoNebs empirically some doxycycline.  Will see how he does over the next 24 hours.    3.  Hypertension: Longstanding history of such.  Will continue with the clonidine patch and his amlodipine plus the Coreg.                Diet:  Regular  DVT Prophylaxis: Enoxaparin (Lovenox) SQ  Gill Catheter: Not present  Lines: None     Cardiac Monitoring: None  Code Status:  Full code    Clinically Significant Risk Factors Present on Admission                   # Hypertension: Noted on problem list     # Acute Hypercapnic Respiratory Failure: based on venous blood gas results.  Continue supplemental oxygen and ventilatory support as indicated.            # COPD: noted on problem list        Disposition Plan     Medically Ready for Discharge: Anticipated in 2-4 Days           Jw Sinha  MD  Hospitalist Service  [facility]  Securely message with Christa (more info)  Text page via [facility] Paging/Directory     ______________________________________________________________________    Chief Complaint   Shortness of breath    History is obtained from the patient, electronic health record, and emergency department physician    History of Present Illness   Ry Man is a 59 year old male who presented to the emergency room with complaints of increasing shortness of breath.  Patient does have fairly severe baseline COPD.  He is not on oxygen.  His FEV1 several years ago was 1.01 L.  Did have a significant bronchodilator response however.  He several days ago on March 6 presented to the ER for evaluation as he had some increasing shortness of breath and O2 sats around 85% range.  They thought that he had some CO exposure.  As he is a  at the [employer].  Came to the ER was put on some oxygen CO levels were negative and he actually felt better but time he left the ER with sats in the mid 90% range.  Ever since then he is just felt not quite himself.  Has been off-and-on a little short of breath.  And today he had seemingly got much worse checking his O2 sats.  Then the 80-82% range.  Just getting up and moving around to became extremely dyspneic.  Has not had any fevers no shaking chills minimal cough.  Nebulizer did not seem to help at all.  No chest pains palpitations or syncope no peripheral edema.  No abdominal pain bad heartburn.  No choking episodes at all.  Appetites been okay.  When he presented to the ER blood pressure was 170/115 pulse was 82 sats were 96% on 4 L.  He was afebrile 97.2 degrees.  Chest x-ray was performed which was unremarkable.  Lab work showed normal CHEM profile CRP was less than 3.  Procalcitonin 0.04 troponin was normal at 12.  A venous gas had a pH of 7.28 pCO2 at 58.  A D-dimer was 2.90.  It should be noted that back in 2020 was hospitalized with influenza A  and also was found to have pulmonary embolism at that time.  He ended up being ventilated for about 5 or 6 days in the hospital for a couple of weeks.  He was given antibiotics Rocephin and doxycycline.  A CT angiogram was ordered and performed.  It did not show any evidence of pulmonary embolism.  No focal infiltrates either.  He is being admitted to the hospital for treatment of what now appears to be predominantly a COPD exacerbation.  Talking to the patient up on the floor he is able to speak in full sentences.  He does feel significantly better than when he initially presented to the emergency room.    Past Medical History    Past Medical History:   Diagnosis Date    Abnormal liver function test 1/1/2011    Bronchitis, not specified as acute or chronic 2/16/2005    Osteoarthritis 1/1/2011    Predi[facility]     [facility]    Tobacco abuse 1/1/2011    Unspecified essential hypertension 9/1/2006   + Severe COPD.  Spirometry 9/2020 FEV1 of 1.01 FVC 2.984 834% FEV1/FVC ratio.  DLCO was 45% of predicted.  Total lung capacity was 9.71 L 127% of predicted.  Did have a significant response to bronchodilators.  + History of pulmonary embolism 2/2020  + Status post endovascular repair of abdominal aortic aneurysm August 2024  + Echocardiogram 2/2020 EF 55-60%    Past Surgical History   Past Surgical History:   Procedure Laterality Date    cysts removed from neck      Bilateral    vasectomy      wisdom teeth extraction         Prior to Admission Medications   Prior to Admission Medications   Prescriptions Last Dose Informant Patient Reported? Taking?   ASMANEX  MCG/ACT inhaler   Yes No   Sig: Inhale 2 puffs into the lungs 2 times daily   Bacillus Coagulans-Inulin (PROBIOTIC) 1-250 BILLION-MG CAPS   Yes No   Glucosamine-Chondroit-Vit C-Mn (GLUCOSAMINE CHONDROITIN 1500 COMPLEX) CAPS   Yes No   Plant Sterols and Stanols 450 MG TABS  Self Yes No   Sig: Take 900 mg by mouth daily   STIOLTO RESPIMAT 2.5-2.5  MCG/ACT AERS   Yes No   albuterol (PROAIR HFA/PROVENTIL HFA/VENTOLIN HFA) 108 (90 Base) MCG/ACT inhaler  Self No No   Sig: Inhale 1-2 puffs into the lungs every 4 hours as needed for shortness of breath / dyspnea or wheezing   albuterol (PROVENTIL) (2.5 MG/3ML) 0.083% neb solution   No No   Sig: Take 1 vial (2.5 mg) by nebulization every 6 hours as needed for shortness of breath / dyspnea or wheezing   amLODIPine (NORVASC) 10 MG tablet   No No   Sig: Take 1 tablet (10 mg) by mouth daily   ascorbic acid 1000 MG TABS tablet   Yes No   Sig: Take 1,000 mg by mouth daily   Patient not taking: Reported on 2024   carvedilol (COREG) 12.5 MG tablet   No No   Sig: Take 1 tablet (12.5 mg) by mouth 2 times daily (with meals)   cloNIDine (CATAPRES) 0.1 MG tablet   No No   Sig: Take 1 tablet (0.1 mg) by mouth 2 times daily   cloNIDine (CATAPRES-TTS3) 0.3 MG/24HR WK patch  Self No No   Sig: Place 1 patch onto the skin once a week   ezetimibe (ZETIA) 10 MG tablet   No No   Sig: Take 1 tablet (10 mg) by mouth daily   Patient not taking: Reported on 2024   hydrochlorothiazide (HYDRODIURIL) 25 MG tablet   No No   Sig: Take 1 tablet (25 mg) by mouth daily      Facility-Administered Medications: None        Review of Systems    The 10 point Review of Systems is negative other than noted in the HPI or here.      Social History   I have reviewed this patient's social history and updated it with pertinent information if needed.  Social History     Tobacco Use    Smoking status: Former     Current packs/day: 0.00     Average packs/day: 1 pack/day for 40.1 years (40.1 ttl pk-yrs)     Types: Cigarettes     Start date: 1980     Quit date: 2020     Years since quittin.1     Passive exposure: Past    Smokeless tobacco: Never    Tobacco comments:     pt quit 20   Vaping Use    Vaping status: Never Used   Substance Use Topics    Alcohol use: No    Drug use: No         Family History   I have reviewed this patient's family  "history and updated it with pertinent information if needed.  Family History   Problem Relation Age of Onset    Heart Failure Father 72        CHF, cause of death    Diabetes Father     C.A.D. Mother     Diabetes Mother     C.A.D. Brother 51        Cause of death    Diabetes Brother     Hypertension Brother     Other - See Comments Brother         Multiple Sclerosis         Allergies   Allergies   Allergen Reactions    Homeopathic Products      \"Ethyl Alcohol\"    Lisinopril Other (See Comments)     Cough      Valsartan Rash and GI Disturbance     Diovan        Physical Exam   Vital Signs: Temp: 97.7  F (36.5  C) Temp src: Tympanic BP: 120/80 Pulse: 77   Resp: 22 SpO2: (!) 88 % O2 Device: Nasal cannula Oxygen Delivery: 1 LPM (turned upto 2L)  Weight: 180 lbs 12.44 oz    Constitutional: awake, alert, cooperative, no apparent distress, and appears stated age  Eyes: Lids and lashes normal, pupils equal, round and reactive to light, extra ocular muscles intact, sclera clear, conjunctiva normal  ENT: Normocephalic, without obvious abnormality, atraumatic, sinuses nontender on palpation, external ears without lesions, oral pharynx with moist mucous membranes, tonsils without erythema or exudates, gums normal and good dentition.  Respiratory: Decreased air movement bilaterally.  There are a few expiratory wheezes noted.  No obvious areas of consolidation.  Cardiovascular: Distant heart tones but a regular rate and rhythm.  Neck veins appear to be flat.  Pulses are 2+ and equal.  GI: No scars, normal bowel sounds, soft, non-distended, non-tender, no masses palpated, no hepatosplenomegally  Musculoskeletal: There is no redness, warmth, or swelling of the joints.  Full range of motion noted.  Motor strength is 5 out of 5 all extremities bilaterally.  Tone is normal.  Neurologic: Awake, alert, oriented to name, place and time.  Cranial nerves II-XII are grossly intact.  Motor is 5 out of 5 bilaterally.  Cerebellar finger to " nose, heel to shin intact.  Sensory is intact.  Babinski down going, Romberg negative, and gait is normal.    Medical Decision Making       60 MINUTES SPENT BY ME on the date of service doing chart review, history, exam, documentation & further activities per the note.      Data     I have personally reviewed the following data over the past 24 hrs:    7.4  \   15.5   / 188     137 100 15.6 /  165 (H)   4.4 25 0.86 \     ALT: 25 AST: 25 AP: 87 TBILI: 0.8   ALB: 4.9 TOT PROTEIN: 8.6 (H) LIPASE: N/A     Trop: 17 BNP: N/A     Procal: 0.04 CRP: <3.00 Lactic Acid: 0.8       INR:  N/A PTT:  N/A   D-dimer:  2.90 (H) Fibrinogen:  N/A       Imaging results reviewed over the past 24 hrs:   Recent Results (from the past 24 hours)   XR Chest 2 Views    Narrative    PROCEDURE:  XR CHEST 2 VIEWS    HISTORY: dyspnea, .    COMPARISON:  6/12/24    FINDINGS:  The cardiomediastinal contours are stable. The trachea is midline.  There is calcific aortic atherosclerosis.  No focal consolidation, effusion or pneumothorax. Scarring at the left  lung base is chronic.  No suspicious osseous lesion or subdiaphragmatic free air.      Impression    IMPRESSION:      No new consolidation.    ADINA FORMAN MD         SYSTEM ID:  [id number]   CTA Chest with Contrast    Narrative    PROCEDURE: CTA CHEST WITH CONTRAST 3/12/2025 1:35 PM    HISTORY: sob, dyspnea, cough, elevated D-Dimer ro PE    COMPARISONS: None.    Meds/Dose Given: ISOVUE 370  64mL    TECHNIQUE: CT angiogram of the chest with sagittal and coronal MIPS  reconstructions    FINDINGS: There are no pulmonary emboli. The thoracic aorta is normal  in caliber. The heart is normal in size.    There is some linear atelectasis or scarring in both lungs. The hilar  and mediastinal lymph nodes are normal in caliber. There is mucous  seen along the walls of the major bronchi at the level of the nahomy.  There is some peribronchial thickening. There is no bronchiectasis.    The axillary and  supraclavicular lymph nodes are normal.    The upper portion of the liver spleen and pancreas appear normal. Is a  large benign-appearing cyst is seen in the left kidney.         Impression    IMPRESSION: No pulmonary emboli.    There is some mucous seen along the walls of the mainstem bronchi and  distal trachea at the level of the nahomy.    Linear atelectasis or scarring in both lungs    MURTAZA ECHOLS MD         SYSTEM ID:  [id number]

## 2025-03-12 NOTE — PLAN OF CARE
[facility] Inpatient Admission Note:    Patient admitted to 3212/3212-1 at approximately 1239 via ambulation accompanied by transport tech from emergency room . Report received from DAVID Chery in SBAR format at 1322 via telephone. Patient ambulated to bed via self.. Patient is alert and oriented X 3, denies pain; rates at 0 on 0-10 scale.  Patient oriented to room, unit, hourly rounding, and plan of care. Explained admission packet and patient handbook with patient bill of rights brochure. Will continue to monitor and document as needed.     Inpatient Nursing criteria listed below was met:    Health care directives status obtained and documented: Yes    Patient identifies a surrogate decision maker: Yes If yes, who:Elisha Contact Information:See facesheet     If initial lactic acid greater than 2.0, repeat lactic acid drawn within one hour of arrival to unit: NA. If no, state reason:     Clergy visit ordered if patient requests: N/A    Skin issues/needs documented: Yes    Isolation Patient: no Education given, correct sign in place and documentation row added to PCS:  Yes    Fall Prevention Yes: Care plan updated, education given and documented, sticker and magnet in place: Yes    Care Plan initiated: Yes    Education Documented (including assessment): Yes    Patient has discharge needs : No If yes, please explain:not at this time

## 2025-03-12 NOTE — ED TRIAGE NOTES
In [place] ED last week for SOB, hx COPD. Breathing has worsened over the last several days. O2 sats mid 80s% on room air. Duoneb and supplemental O2 initiated by EMS.         [place]

## 2025-03-12 NOTE — LETTER
[facility]  [address]  [place] [address]  Phone: [phone]  Fax: [phone]    March 13, 2025        Ry Man  [address]  [place] [address]          To whom it may concern:    RE: Ry Man     Please excuse Ry for missing work from 3/12/2025-3/18/2025 as he was under my care. He may return to work without restriction on 3/19/2025.    Please contact me for questions or concerns.      Sincerely,    Fritz [facility]

## 2025-03-13 ENCOUNTER — APPOINTMENT (OUTPATIENT)
Dept: PHYSICAL THERAPY | Facility: HOSPITAL | Age: 60
End: 2025-03-13
Attending: HOSPITALIST
Payer: COMMERCIAL

## 2025-03-13 VITALS
DIASTOLIC BLOOD PRESSURE: 89 MMHG | HEART RATE: 78 BPM | HEIGHT: 73 IN | SYSTOLIC BLOOD PRESSURE: 128 MMHG | RESPIRATION RATE: 22 BRPM | OXYGEN SATURATION: 93 % | WEIGHT: 181.44 LBS | BODY MASS INDEX: 24.05 KG/M2 | TEMPERATURE: 97.9 F

## 2025-03-13 PROBLEM — J44.9 STAGE 3 SEVERE COPD BY GOLD CLASSIFICATION (H): Status: ACTIVE | Noted: 2023-05-24

## 2025-03-13 LAB
ANION GAP SERPL CALCULATED.3IONS-SCNC: 13 MMOL/L (ref 7–15)
BUN SERPL-MCNC: 18 MG/DL (ref 8–23)
CALCIUM SERPL-MCNC: 9.5 MG/DL (ref 8.8–10.4)
CHLORIDE SERPL-SCNC: 102 MMOL/L (ref 98–107)
CREAT SERPL-MCNC: 0.73 MG/DL (ref 0.67–1.17)
EGFRCR SERPLBLD CKD-EPI 2021: >90 ML/MIN/1.73M2
ERYTHROCYTE [DISTWIDTH] IN BLOOD BY AUTOMATED COUNT: 13.5 % (ref 10–15)
GLUCOSE SERPL-MCNC: 170 MG/DL (ref 70–99)
HCO3 SERPL-SCNC: 21 MMOL/L (ref 22–29)
HCT VFR BLD AUTO: 40.7 % (ref 40–53)
HGB BLD-MCNC: 14.1 G/DL (ref 13.3–17.7)
MCH RBC QN AUTO: 31.3 PG (ref 26.5–33)
MCHC RBC AUTO-ENTMCNC: 34.6 G/DL (ref 31.5–36.5)
MCV RBC AUTO: 90 FL (ref 78–100)
PLATELET # BLD AUTO: 183 10E3/UL (ref 150–450)
POTASSIUM SERPL-SCNC: 4.2 MMOL/L (ref 3.4–5.3)
RBC # BLD AUTO: 4.5 10E6/UL (ref 4.4–5.9)
SODIUM SERPL-SCNC: 136 MMOL/L (ref 135–145)
WBC # BLD AUTO: 8.7 10E3/UL (ref 4–11)

## 2025-03-13 PROCEDURE — 94640 AIRWAY INHALATION TREATMENT: CPT | Mod: 76

## 2025-03-13 PROCEDURE — 85027 COMPLETE CBC AUTOMATED: CPT | Performed by: INTERNAL MEDICINE

## 2025-03-13 PROCEDURE — 999N000157 HC STATISTIC RCP TIME EA 10 MIN

## 2025-03-13 PROCEDURE — 99239 HOSP IP/OBS DSCHRG MGMT >30: CPT | Performed by: HOSPITALIST

## 2025-03-13 PROCEDURE — 250N000009 HC RX 250: Performed by: INTERNAL MEDICINE

## 2025-03-13 PROCEDURE — 250N000012 HC RX MED GY IP 250 OP 636 PS 637: Performed by: INTERNAL MEDICINE

## 2025-03-13 PROCEDURE — 82310 ASSAY OF CALCIUM: CPT | Performed by: INTERNAL MEDICINE

## 2025-03-13 PROCEDURE — 250N000011 HC RX IP 250 OP 636: Performed by: INTERNAL MEDICINE

## 2025-03-13 PROCEDURE — G0378 HOSPITAL OBSERVATION PER HR: HCPCS

## 2025-03-13 PROCEDURE — 80048 BASIC METABOLIC PNL TOTAL CA: CPT | Performed by: INTERNAL MEDICINE

## 2025-03-13 PROCEDURE — 36415 COLL VENOUS BLD VENIPUNCTURE: CPT | Performed by: INTERNAL MEDICINE

## 2025-03-13 PROCEDURE — 97161 PT EVAL LOW COMPLEX 20 MIN: CPT | Mod: GP

## 2025-03-13 PROCEDURE — 82565 ASSAY OF CREATININE: CPT | Performed by: INTERNAL MEDICINE

## 2025-03-13 PROCEDURE — 94640 AIRWAY INHALATION TREATMENT: CPT

## 2025-03-13 PROCEDURE — 250N000013 HC RX MED GY IP 250 OP 250 PS 637: Performed by: INTERNAL MEDICINE

## 2025-03-13 PROCEDURE — 83036 HEMOGLOBIN GLYCOSYLATED A1C: CPT

## 2025-03-13 PROCEDURE — 96376 TX/PRO/DX INJ SAME DRUG ADON: CPT

## 2025-03-13 RX ORDER — DOXYCYCLINE 100 MG/1
100 CAPSULE ORAL 2 TIMES DAILY
Qty: 14 CAPSULE | Refills: 0 | Status: SHIPPED | OUTPATIENT
Start: 2025-03-13 | End: 2025-03-20

## 2025-03-13 RX ORDER — IPRATROPIUM BROMIDE AND ALBUTEROL SULFATE 2.5; .5 MG/3ML; MG/3ML
3 SOLUTION RESPIRATORY (INHALATION) EVERY 4 HOURS PRN
Qty: 90 ML | Refills: 0 | Status: SHIPPED | OUTPATIENT
Start: 2025-03-13 | End: 2025-03-17

## 2025-03-13 RX ORDER — ALBUTEROL SULFATE 90 UG/1
1-2 INHALANT RESPIRATORY (INHALATION) EVERY 4 HOURS PRN
Qty: 18 G | Refills: 0 | Status: SHIPPED | OUTPATIENT
Start: 2025-03-13

## 2025-03-13 RX ORDER — PREDNISONE 20 MG/1
40 TABLET ORAL DAILY
Qty: 10 TABLET | Refills: 0 | Status: SHIPPED | OUTPATIENT
Start: 2025-03-14 | End: 2025-03-19

## 2025-03-13 RX ADMIN — IPRATROPIUM BROMIDE AND ALBUTEROL SULFATE 3 ML: .5; 3 SOLUTION RESPIRATORY (INHALATION) at 14:32

## 2025-03-13 RX ADMIN — AMLODIPINE BESYLATE 10 MG: 5 TABLET ORAL at 08:19

## 2025-03-13 RX ADMIN — IPRATROPIUM BROMIDE AND ALBUTEROL SULFATE 3 ML: .5; 3 SOLUTION RESPIRATORY (INHALATION) at 10:08

## 2025-03-13 RX ADMIN — CARVEDILOL 6.25 MG: 3.12 TABLET, FILM COATED ORAL at 08:19

## 2025-03-13 RX ADMIN — IPRATROPIUM BROMIDE AND ALBUTEROL SULFATE 3 ML: .5; 3 SOLUTION RESPIRATORY (INHALATION) at 06:02

## 2025-03-13 RX ADMIN — PREDNISONE 40 MG: 20 TABLET ORAL at 08:19

## 2025-03-13 RX ADMIN — DOXYCYCLINE 100 MG: 100 INJECTION, POWDER, LYOPHILIZED, FOR SOLUTION INTRAVENOUS at 12:59

## 2025-03-13 ASSESSMENT — ACTIVITIES OF DAILY LIVING (ADL)
ADLS_ACUITY_SCORE: 28
ADLS_ACUITY_SCORE: 27
ADLS_ACUITY_SCORE: 27
ADLS_ACUITY_SCORE: 28

## 2025-03-13 NOTE — PLAN OF CARE
Goal Outcome Evaluation:  Face to face report given with opportunity to observe patient.    Report given to Kirsten Telles RN   3/13/2025  7:21 AM

## 2025-03-13 NOTE — PROGRESS NOTES
Care Transitions focused note:      Assessment completed with Ry.    LOC: alert     Dx: ARF  Chronic Disease Management: HTN, DM2, COPD  Lives with: alone  Living at:  home in [place]  Transportation: YES, drives self    Primary PCP: Ana Pereira  Insurance:  [facility]      Support System:  family  Homecare/PCA: no  /County Services:   no  [place]: YES      [facility]: Em[facility] admitting    Health Care Directive: POLST on file  Guardian: no  POA: no    Pharmacy: [facility]  Meds management: manages own    Adequate Resources for needs (housing, utilities, food/med): YES  Household chores: self  Work/community/social activity: YES, is a  at [employer]    ADLs: independent  Ambulation:independent  Falls: no  Nutrition: no concern  Sleep: sleep is broken, works shifts    Equipment used: none      Oxygen supplier: no      Does the supplier have valid oxygen orders: n/a    Mental health: denies  Substance abuse: denies  Exposure to violence/abuse: denies  Stressors: denies    Able to Return to Prior Living Arrangements: YES    Choice of Vendor: n/a    Barriers: none identified    TRINIDAD: low    Plan: home    Dahiana Fuentes CM

## 2025-03-13 NOTE — DISCHARGE SUMMARY
[facility]  Hospitalist Discharge Summary      Date of Admission:  3/12/2025  Date of Discharge:  3/13/2025  Discharging Provider: Dionne Balbuena DO  Discharge Service: Hospitalist Service    Discharge Diagnoses     Acute respiratory failure with hypoxia:   2.  Acute exacerbation of COPD:   3.  Hypertension:        Clinically Significant Risk Factors          Follow-ups Needed After Discharge   Follow-up Appointments       Follow-up and recommended labs and tests       Follow up with primary care provider, Ana Pereira, within 7 days for hospital follow- up.  No follow up labs or test are needed.                 Unresulted Labs Ordered in the Past 30 Days of this Admission       No orders found for last 31 day(s).        These results will be followed up by Ana Pereira MD      Discharge Disposition   Discharged to home  Condition at discharge: Stable    Hospital Course   Patient is a 59-year-old male who has a history of tobacco use, COPD, long COVID, hypertension, he presented to emergency room with complaint of shortness of breath wheezing was found to have COPD exacerbation acute hypoxic respiratory failure.  Patient was started on Solu-Medrol transition to prednisone she did receive bronchodilators DuoNebs.  He was feeling little better on March 13.  I also did have physical therapy work with him and he was able to ambulate he does get short of breath at times but he knows to stop and recovers.  Plan on discharge home with oral prednisone doxycycline and follow-up with primary care doctor.    Consultations This Hospital Stay   PHYSICAL THERAPY ADULT IP CONSULT    Code Status   Full Code    Time Spent on this Encounter   IDionne DO, personally saw the patient today and spent greater than 30 minutes discharging this patient.       Dionne Balbuena DO  [facility]  [address] [facility] [address]  Phone: [phone]  Fax:  [phone]  ______________________________________________________________________    Physical Exam   Vital Signs: Temp: 97.9  F (36.6  C) Temp src: Tympanic BP: 128/89 Pulse: 78   Resp: 22 SpO2: 93 % O2 Device: None (Room air) Oxygen Delivery: 1 LPM (titrated to RA)  Weight: 181 lbs 7.02 oz  Physical Exam  HENT:      Right Ear: External ear normal.      Left Ear: External ear normal.      Nose: Nose normal.      Mouth/Throat:      Pharynx: Oropharynx is clear.   Eyes:      Conjunctiva/sclera: Conjunctivae normal.   Cardiovascular:      Rate and Rhythm: Normal rate.   Pulmonary:      Effort: Pulmonary effort is normal.      Comments: Distant lung sounds   Abdominal:      General: Abdomen is flat.   Musculoskeletal:         General: No swelling.   Skin:     Coloration: Skin is not jaundiced.   Neurological:      Mental Status: He is alert. Mental status is at baseline.         Primary Care Physician   Ana Stark    Discharge Orders      Reason for your hospital stay    Patient is a 59-year-old male who has a history of tobacco use, COPD, long COVID, hypertension, he presented to emergency room with complaint of shortness of breath wheezing was found to have COPD exacerbation acute hypoxic respiratory failure.  Patient was started on Solu-Medrol transition to prednisone she did receive bronchodilators DuoNebs.  He was feeling little better on March 13.  I also did have physical therapy work with him and he was able to ambulate he does get short of breath at times but he knows to stop and recovers.  Plan on discharge home with oral prednisone doxycycline and follow-up with primary care doctor.     Follow-up and recommended labs and tests     Follow up with primary care provider, Ana Stark, within 7 days for hospital follow- up.  No follow up labs or test are needed.     Activity    Your activity upon discharge: activity as tolerated     Diet    Follow this diet upon discharge: Current Diet:Orders  Placed This Encounter      Combination Diet Regular Diet Adult       Significant Results and Procedures   Most Recent 3 CBC's:  Recent Labs   Lab Test 03/13/25  0605 03/12/25  0903 08/19/24  1343   WBC 8.7 7.4 9.0   HGB 14.1 15.5 15.4   MCV 90 95 92    188 181     Most Recent 3 BMP's:  Recent Labs   Lab Test 03/13/25  0605 03/12/25  0903 08/19/24  1343    137 141   POTASSIUM 4.2 4.4 4.5   CHLORIDE 102 100 104   CO2 21* 25 24   BUN 18.0 15.6 22.8   CR 0.73 0.86 0.83   ANIONGAP 13 12 13   CARLTON 9.5 9.7 10.1   * 165* 114*   ,   Results for orders placed or performed during the hospital encounter of 03/12/25   XR Chest 2 Views    Narrative    PROCEDURE:  XR CHEST 2 VIEWS    HISTORY: dyspnea, .    COMPARISON:  6/12/24    FINDINGS:  The cardiomediastinal contours are stable. The trachea is midline.  There is calcific aortic atherosclerosis.  No focal consolidation, effusion or pneumothorax. Scarring at the left  lung base is chronic.  No suspicious osseous lesion or subdiaphragmatic free air.      Impression    IMPRESSION:      No new consolidation.    ADINA FORMAN MD         SYSTEM ID:  [id number]   CTA Chest with Contrast    Narrative    PROCEDURE: CTA CHEST WITH CONTRAST 3/12/2025 1:35 PM    HISTORY: sob, dyspnea, cough, elevated D-Dimer ro PE    COMPARISONS: None.    Meds/Dose Given: ISOVUE 370  64mL    TECHNIQUE: CT angiogram of the chest with sagittal and coronal MIPS  reconstructions    FINDINGS: There are no pulmonary emboli. The thoracic aorta is normal  in caliber. The heart is normal in size.    There is some linear atelectasis or scarring in both lungs. The hilar  and mediastinal lymph nodes are normal in caliber. There is mucous  seen along the walls of the major bronchi at the level of the nahomy.  There is some peribronchial thickening. There is no bronchiectasis.    The axillary and supraclavicular lymph nodes are normal.    The upper portion of the liver spleen and pancreas appear  normal. Is a  large benign-appearing cyst is seen in the left kidney.         Impression    IMPRESSION: No pulmonary emboli.    There is some mucous seen along the walls of the mainstem bronchi and  distal trachea at the level of the nahomy.    Linear atelectasis or scarring in both lungs    MURTAZA ECHOLS MD         SYSTEM ID:  [id number]       Discharge Medications   Current Discharge Medication List        START taking these medications    Details   doxycycline hyclate (VIBRAMYCIN) 100 MG capsule Take 1 capsule (100 mg) by mouth 2 times daily for 7 days.  Qty: 14 capsule, Refills: 0    Associated Diagnoses: COPD with acute exacerbation (H)      ipratropium - albuterol 0.5 mg/2.5 mg/3 mL (DUONEB) 0.5-2.5 (3) MG/3ML neb solution Take 1 vial (3 mLs) by nebulization every 4 hours as needed for wheezing.  Qty: 90 mL, Refills: 0    Associated Diagnoses: Chronic obstructive pulmonary disease with acute exacerbation (H)      predniSONE (DELTASONE) 20 MG tablet Take 2 tablets (40 mg) by mouth daily for 5 days.  Qty: 10 tablet, Refills: 0    Associated Diagnoses: COPD with acute exacerbation (H)           CONTINUE these medications which have CHANGED    Details   albuterol (PROAIR HFA/PROVENTIL HFA/VENTOLIN HFA) 108 (90 Base) MCG/ACT inhaler Inhale 1-2 puffs into the lungs every 4 hours as needed for shortness of breath or wheezing.  Qty: 18 g, Refills: 0    Comments: Pharmacy may dispense brand covered by insurance (Proair, or proventil or ventolin or generic albuterol inhaler)  Associated Diagnoses: Chronic obstructive pulmonary disease with acute exacerbation (H)           CONTINUE these medications which have NOT CHANGED    Details   albuterol (PROVENTIL) (2.5 MG/3ML) 0.083% neb solution Take 1 vial (2.5 mg) by nebulization every 6 hours as needed for shortness of breath / dyspnea or wheezing  Qty: 25 mL, Refills: 3    Associated Diagnoses: Chronic obstructive pulmonary disease with acute exacerbation (H); Stage 3  "severe COPD by GOLD classification (H)      amLODIPine (NORVASC) 10 MG tablet Take 1 tablet (10 mg) by mouth daily  Qty: 90 tablet, Refills: 0    Associated Diagnoses: Essential hypertension      ascorbic acid 1000 MG TABS tablet Take 1,000 mg by mouth daily.      ASMANEX  MCG/ACT inhaler Inhale 2 puffs into the lungs 2 times daily      carvedilol (COREG) 12.5 MG tablet Take 1 tablet (12.5 mg) by mouth 2 times daily (with meals)  Qty: 60 tablet, Refills: 1    Associated Diagnoses: Essential hypertension      Glucosamine-Chondroit-Vit C-Mn (GLUCOSAMINE CHONDROITIN 1500 COMPLEX) CAPS       hydrochlorothiazide (HYDRODIURIL) 25 MG tablet Take 1 tablet (25 mg) by mouth daily  Qty: 90 tablet, Refills: 1    Associated Diagnoses: Essential hypertension      Plant Sterols and Stanols 450 MG TABS Take 900 mg by mouth daily      STIOLTO RESPIMAT 2.5-2.5 MCG/ACT AERS       Bacillus Coagulans-Inulin (PROBIOTIC) 1-250 BILLION-MG CAPS       cloNIDine (CATAPRES) 0.1 MG tablet Take 1 tablet (0.1 mg) by mouth 2 times daily  Qty: 60 tablet, Refills: 0    Associated Diagnoses: Essential hypertension      cloNIDine (CATAPRES-TTS3) 0.3 MG/24HR WK patch Place 1 patch onto the skin once a week    Associated Diagnoses: Benign essential hypertension      ezetimibe (ZETIA) 10 MG tablet Take 1 tablet (10 mg) by mouth daily  Qty: 90 tablet, Refills: 3    Associated Diagnoses: Hyperlipidemia, unspecified hyperlipidemia type           Allergies   Allergies   Allergen Reactions    Homeopathic Products      \"Ethyl Alcohol\"    Lisinopril Other (See Comments)     Cough      Valsartan Rash and GI Disturbance     Diovan     "

## 2025-03-13 NOTE — PLAN OF CARE
Goal Outcome Evaluation:      Plan of Care Reviewed With: patient  Pt A&Ox4. Makes needs known. IV S.L. AAbx infused. Prn nebs as charted. Vs and assessments as charted. Lung sounds diminished occ EW LLL. Per pt occ congested cough brown phlegm. DIEZ with exertion. Using urinal at bedside. Denies pain.

## 2025-03-13 NOTE — PLAN OF CARE
Patient discharged at 2:32 PM via wheel chair accompanied by daughter and staff. Prescriptions sent to patients preferred pharmacy. All belongings sent with patient.     Discharge instructions reviewed with patient and daughter. Listed belongings gathered and returned to patient. yes    Patient discharged to home.   Report called to NA    Surgical Patient   Surgical Procedures during stay: no  Did patient receive discharge instruction on wound care and recognition of infection symptoms? N/A    MISC  Follow up appointment made:  Yes  Home medications returned to patient: Yes  Patient reports pain was well managed at discharge: Yes

## 2025-03-13 NOTE — PROGRESS NOTES
03/13/25 1200   Appointment Info   Signing Clinician's Name / Credentials (PT) Beth Shankar DPT   Living Environment   People in Home alone   Current Living Arrangements house   Home Accessibility stairs to enter home   Number of Stairs, Main Entrance 3   Stair Railings, Main Entrance railings safe and in good condition   Self-Care   Usual Activity Tolerance good   Current Activity Tolerance moderate   Regular Exercise Yes   Activity/Exercise Type walking   Exercise Amount/Frequency 3-5 times/wk   Equipment Currently Used at Home none   Fall history within last six months no   Activity/Exercise/Self-Care Comment Pt reports he works as a . [facility]   General Information   Onset of Illness/Injury or Date of Surgery 03/13/25   Referring Physician Dr. Balbuena   Patient/Family Therapy Goals Statement (PT) Return home   Pertinent History of Current Problem (include personal factors and/or comorbidities that impact the POC) Pt hospitalized c acute exacerbation of COPD   Existing Precautions/Restrictions no known precautions/restrictions   Cognition   Affect/Mental Status (Cognition) WNL   Orientation Status (Cognition) oriented x 4   Follows Commands (Cognition) WFL   Pain Assessment   Patient Currently in Pain No   Integumentary/Edema   Integumentary/Edema no deficits were identifed   Posture    Posture Not impaired   Range of Motion (ROM)   Range of Motion ROM is WFL   Strength (Manual Muscle Testing)   Strength (Manual Muscle Testing) strength is WFL   Bed Mobility   Bed Mobility no deficits identified   Transfers   Transfers no deficits identified   Gait/Stairs (Locomotion)   [place] Level (Gait) independent   Distance in Feet (Gait) 400   Pattern (Gait) swing-through   Comment, (Gait/Stairs) SpO2 88-93% on room air with activity   Balance   Balance no deficits were identified   Sensory Examination   Sensory Perception patient reports no  sensory changes   Coordination   Coordination no deficits were identified   Clinical Impression   Criteria for Skilled Therapeutic Intervention Evaluation only   PT Diagnosis (PT) h/o decreased activity tolerance   Influenced by the following impairments Mildy decreased activity tolerance compared to baseline   Functional limitations due to impairments Decreased tolerance for ambulation longer distances   Clinical Presentation (PT Evaluation Complexity) stable   Clinical Presentation Rationale clinical judgement   Clinical Decision Making (Complexity) low complexity   Risk & Benefits of therapy have been explained risks/benefits reviewed;care plan/treatment goals reviewed;evaluation/treatment results reviewed;current/potential barriers reviewed;participants voiced agreement with care plan;participants included;patient   Clinical Impression Comments PT evaluation completed for d/c recommendation. Pt is safe to d/c home from a functional standpoint. Pt is aware of when he needs to take breaks and monitors his own SpO2 at home. Pt plans to continue c walking program as tolerated at home. Will complete PT order   PT Total Evaluation Time   PT Eval, Low Complexity Minutes (75894) 15   Physical Therapy Goals   PT Frequency One time eval and treatment only   PT Predicted Duration/Target Date for Goal Attainment 03/13/25   PT Discharge Planning   PT Plan Pt plans to d/c home independently   PT Discharge Recommendation (DC Rec) home   PT Rationale for DC Rec See clinical impression/comments   PT Brief overview of current status Independent with transfers and ambulation   PT Total Distance Amb During Session (feet) 400   Physical Therapy Time and Intention   Total Session Time (sum of timed and untimed services) 15

## 2025-03-17 ENCOUNTER — OFFICE VISIT (OUTPATIENT)
Dept: FAMILY MEDICINE | Facility: OTHER | Age: 60
End: 2025-03-17
Attending: FAMILY MEDICINE
Payer: COMMERCIAL

## 2025-03-17 ENCOUNTER — PATIENT OUTREACH (OUTPATIENT)
Dept: CARE COORDINATION | Facility: OTHER | Age: 60
End: 2025-03-17

## 2025-03-17 VITALS
BODY MASS INDEX: 25.1 KG/M2 | SYSTOLIC BLOOD PRESSURE: 120 MMHG | DIASTOLIC BLOOD PRESSURE: 88 MMHG | HEIGHT: 73 IN | TEMPERATURE: 97.4 F | OXYGEN SATURATION: 95 % | WEIGHT: 189.4 LBS | HEART RATE: 69 BPM

## 2025-03-17 DIAGNOSIS — I10 ESSENTIAL HYPERTENSION: ICD-10-CM

## 2025-03-17 DIAGNOSIS — E78.5 HYPERLIPIDEMIA, UNSPECIFIED HYPERLIPIDEMIA TYPE: ICD-10-CM

## 2025-03-17 DIAGNOSIS — J44.1 COPD WITH ACUTE EXACERBATION (H): Primary | ICD-10-CM

## 2025-03-17 DIAGNOSIS — J96.01 ACUTE RESPIRATORY FAILURE WITH HYPOXIA (H): ICD-10-CM

## 2025-03-17 DIAGNOSIS — Z87.891 FORMER SMOKER: ICD-10-CM

## 2025-03-17 DIAGNOSIS — Z78.9 STATIN INTOLERANCE: ICD-10-CM

## 2025-03-17 DIAGNOSIS — Z12.11 SPECIAL SCREENING FOR MALIGNANT NEOPLASMS, COLON: ICD-10-CM

## 2025-03-17 DIAGNOSIS — E11.9 TYPE 2 DIABETES MELLITUS WITHOUT COMPLICATION, WITHOUT LONG-TERM CURRENT USE OF INSULIN (H): ICD-10-CM

## 2025-03-17 DIAGNOSIS — J44.1 CHRONIC OBSTRUCTIVE PULMONARY DISEASE WITH ACUTE EXACERBATION (H): ICD-10-CM

## 2025-03-17 LAB
EST. AVERAGE GLUCOSE BLD GHB EST-MCNC: 146 MG/DL
HBA1C MFR BLD: 6.7 %

## 2025-03-17 RX ORDER — PREDNISONE 10 MG/1
TABLET ORAL
Qty: 9 TABLET | Refills: 0 | Status: SHIPPED | OUTPATIENT
Start: 2025-03-17

## 2025-03-17 RX ORDER — CARVEDILOL 6.25 MG/1
6.25 TABLET ORAL 2 TIMES DAILY WITH MEALS
Qty: 180 TABLET | Refills: 1 | Status: SHIPPED | OUTPATIENT
Start: 2025-03-17

## 2025-03-17 RX ORDER — IPRATROPIUM BROMIDE AND ALBUTEROL SULFATE 2.5; .5 MG/3ML; MG/3ML
3 SOLUTION RESPIRATORY (INHALATION) EVERY 4 HOURS PRN
Qty: 90 ML | Refills: 0 | Status: SHIPPED | OUTPATIENT
Start: 2025-03-17

## 2025-03-17 ASSESSMENT — PAIN SCALES - GENERAL: PAINLEVEL_OUTOF10: NO PAIN (0)

## 2025-03-17 NOTE — PROGRESS NOTES
Clinic Care Coordination Contact  Care Team Conversations    Second attempt of TCM not completed on this date due to patient showing up for hospital follow up.     aJbari GONZALES RN, Care Coordinator  [facility]

## 2025-03-17 NOTE — PROGRESS NOTES
Assessment & Plan     COPD with acute exacerbation (H)  Resolving.  Goes back to work tomorrow.  Still some expiratory wheeze.  Plan to extend taper - on 40 mg daily x 4 days.  Will add 3 days 20 mg and 3 days 10 mg.    Chronic obstructive pulmonary disease with acute exacerbation (H)  As above.  Refilled duonebs.  - ipratropium - albuterol 0.5 mg/2.5 mg/3 mL (DUONEB) 0.5-2.5 (3) MG/3ML neb solution; Take 1 vial (3 mLs) by nebulization every 4 hours as needed for wheezing.  - predniSONE (DELTASONE) 10 MG tablet; Take 2 tabs daily x 3 days, then 1 tab daily x 3 days    Acute respiratory failure with hypoxia (H)  Resolved.    Type 2 diabetes mellitus without complication, without long-term current use of insulin (H)  Stable.  A1c at goal <7%.  Declines statin-  intolerant.  [facility] - [facility] microalbumin.  A1c added to hospital lab [place].  - Hemoglobin A1c; Future  - Adult Eye  Referral; Future    Hyperlipidemia, unspecified hyperlipidemia type  Declines statin.  Stopped zetia on his own -no side effects - doesn't want to take medication for cholesterol.    Statin intolerance    Essential hypertension  Stable.  Continue Coreg.  - carvedilol (COREG) 6.25 MG tablet; Take 1 tablet (6.25 mg) by mouth 2 times daily (with meals).    Special screening for malignant neoplasms, colon  Declines colonoscopy.  [facility]    Former smoker  Quit years ago - see history.        MED REC REQUIRED  Post Medication Reconciliation Status: discharge medications reconciled and changed, per note/orders    See Patient Instructions    Return if symptoms worsen or fail to improve, for and for annual physical.    Subjective   Ry is a 59 year old, presenting for the following health issues:  Hospital F/U    HPI      [facility] - [facility] - declines here.  Former smoker.      Hospital Follow-up Visit:    Hospital/Nursing Home/IP Rehab Facility: [facility]  Date of Admission:  3/12/25  Date of Discharge: 3/13/25  Reason(s) for Admission: acute respiratory failure with hypoxia; acute exacerbation COPD, HTN  Triggered by break in exhaust on truck?  Had been in ER at [place] 3/6/25.  Carries pulse oximeter with him.  Sick contacts at work.    Solu Medrol, then Prednisone.  Daniele  PT consult.  Discharged on oral prednisone, doxycycline.  Taking as prescribed.  No side effects.    Vaccines - COVID - declines       Was the patient in the ICU or did the patient experience delirium during hospitalization?  No  Do you have any other stressors you would like to discuss with your provider? No    Problems taking medications regularly:  None  Medication changes since discharge: carvedilol 6.5mg daily  Problems adhering to non-medication therapy:  None    Summary of hospitalization:  [facility] discharge summary reviewed  Diagnostic Tests/Treatments reviewed.  Follow up needed: none  Other Healthcare Providers Involved in Patient s Care:         None  Update since discharge: improved.         Plan of care communicated with patient           Diabetes Follow-up  How often are you checking your blood sugar? Not at all  What concerns do you have today about your diabetes? None   Do you have any of these symptoms? (Select all that apply)  No numbness or tingling in feet.  No redness, sores or blisters on feet.  No complaints of excessive thirst.  No reports of blurry vision.  No significant changes to weight.  Have you had a diabetic eye exam in the last 12 months? No  Avoids processed food; very low carb  Mostly meats and vegetables.  A1c added to ER lab blood    Lab Results   Component Value Date    A1C 6.7 03/13/2025    A1C 6.6 07/22/2024    A1C 6.8 08/10/2023    A1C 7.5 04/11/2023    A1C 6.4 03/13/2020    A1C 6.4 02/16/2020           Hyperlipidemia Follow-Up  Are you regularly taking any medication or supplement to lower your cholesterol?   No - declines; was on zetia; no statin  Are  "you having muscle aches or other side effects that you think could be caused by your cholesterol lowering medication?  Side effects with statin  No side effects on zetia    Hypertension Follow-up  Do you check your blood pressure regularly outside of the clinic? Yes   Are you following a low salt diet? Yes  Are your blood pressures ever more than 140 on the top number (systolic) OR more   than 90 on the bottom number (diastolic), for example 140/90? No  Home readings - 110-120/70s - average - brought home readings  Heart rate 50s    BP Readings from Last 2 Encounters:   03/17/25 120/88   03/13/25 128/89     Hemoglobin A1C (%)   Date Value   03/13/2025 6.7 (H)   07/22/2024 6.6 (H)   03/13/2020 6.4 (H)   02/16/2020 6.4 (H)     LDL Cholesterol Calculated (mg/dL)   Date Value   07/22/2024 177 (H)   08/10/2023 186 (H)   03/13/2020 86   05/07/2013 129     How many servings of fruits and vegetables do you eat daily?  4 or more  On average, how many sweetened beverages do you drink each day (Examples: soda, juice, sweet tea, etc.  Do NOT count diet or artificially sweetened beverages)?   Diet soda 1-2 per day  How many days per week do you exercise enough to make your heart beat faster? 7 - daily mile walk; also at work gets 2-3 miles  How many minutes a day do you exercise enough to make your heart beat faster? 60 or more  How many days per week do you miss taking your medication? 0      Review of Systems  Constitutional, HEENT, cardiovascular, pulmonary, gi and gu systems are negative, except as otherwise noted.      Objective    /88 (BP Location: Right arm, Patient Position: Sitting, Cuff Size: Adult Regular)   Pulse 69   Temp 97.4  F (36.3  C) (Tympanic)   Ht 1.854 m (6' 1\")   Wt 85.9 kg (189 lb 6.4 oz)   SpO2 95%   BMI 24.99 kg/m    Body mass index is 24.99 kg/m .  Physical Exam   GENERAL: alert and no distress  EYES: Eyes grossly normal to inspection, PERRL and conjunctivae and sclerae normal  NECK: no " adenopathy, no asymmetry, masses, or scars  RESP: expiratory wheezes bilateral  CV: regular rate and rhythm, normal S1 S2, no S3 or S4, no murmur, click or rub, no peripheral edema  ABDOMEN: soft, nontender, no hepatosplenomegaly, no masses and bowel sounds normal  MS: no gross musculoskeletal defects noted, no edema  NEURO: Normal strength and tone, mentation intact and speech normal  PSYCH: mentation appears normal, affect normal/bright    No results found for any visits on 03/17/25.  ER labs reviewed and hospital      Lab Results   Component Value Date    A1C 6.7 03/13/2025    A1C 6.6 07/22/2024    A1C 6.8 08/10/2023    A1C 7.5 04/11/2023    A1C 6.4 03/13/2020    A1C 6.4 02/16/2020         Signed Electronically by: Ana Stark MD

## 2025-03-17 NOTE — PATIENT INSTRUCTIONS
Reminder - annual eye exam due.  1st dose Shingrix 10/2024 - due for 2nd/final dose when off prednisone/steroid.    [facility] - Duoneb and Coreg lower dose.    To [facility] - additional Prednisone.  Finish 40 mg daily x 5 days from ER, then 20 mg daily x 3 , then 10 mg daily x 3 days.    Eye exam - due - please schedule.

## 2025-04-07 ENCOUNTER — TELEPHONE (OUTPATIENT)
Dept: FAMILY MEDICINE | Facility: OTHER | Age: 60
End: 2025-04-07

## 2025-04-07 NOTE — TELEPHONE ENCOUNTER
Symptom or reason needing to speak to RN: Patient is having trouble breathing due to complications of COPD and has to miss work.     Best number to return call: [phone]      Best time to return call: Anytime

## 2025-04-07 NOTE — TELEPHONE ENCOUNTER
I will be out of office unexpectedly tomorrow for family emergency.  He will have to go to UC/ER or covering provider with openings.  If concerns of oxygenation - UC/ER advised.

## 2025-04-07 NOTE — TELEPHONE ENCOUNTER
Writer spoke with patient. Patient has history of COPD with last hospitalization in the middle of March. Patient seen for follow up with PCP on 03/17/25. Patient reports finishing prednisone prescription that was prescribed and utilizing inhaler and nebulizer. Patient states symptoms have worsened since last Wednesday with oxygen saturation going between 84% and 92%. Patient states current O2 was 92%. Patient reports wheezing. Writer informed patient multiple times to be seen in ED. Patient declined multiple times and asks to be seen by PCP. PCP to advise.   Writer informed patient message would be sent but to be evaluated in ED with any new or worsening symptoms.

## 2025-04-08 ENCOUNTER — APPOINTMENT (OUTPATIENT)
Dept: GENERAL RADIOLOGY | Facility: HOSPITAL | Age: 60
End: 2025-04-08
Attending: NURSE PRACTITIONER
Payer: COMMERCIAL

## 2025-04-08 ENCOUNTER — TELEPHONE (OUTPATIENT)
Dept: FAMILY MEDICINE | Facility: OTHER | Age: 60
End: 2025-04-08

## 2025-04-08 ENCOUNTER — HOSPITAL ENCOUNTER (EMERGENCY)
Facility: HOSPITAL | Age: 60
Discharge: HOME | End: 2025-04-08
Attending: NURSE PRACTITIONER
Payer: COMMERCIAL

## 2025-04-08 VITALS
BODY MASS INDEX: 24.53 KG/M2 | DIASTOLIC BLOOD PRESSURE: 95 MMHG | SYSTOLIC BLOOD PRESSURE: 158 MMHG | HEART RATE: 76 BPM | TEMPERATURE: 97.6 F | WEIGHT: 185.96 LBS | OXYGEN SATURATION: 92 % | RESPIRATION RATE: 12 BRPM

## 2025-04-08 DIAGNOSIS — J44.1 COPD EXACERBATION (H): ICD-10-CM

## 2025-04-08 LAB
ALBUMIN SERPL BCG-MCNC: 4.5 G/DL (ref 3.5–5.2)
ALP SERPL-CCNC: 80 U/L (ref 40–150)
ALT SERPL W P-5'-P-CCNC: 20 U/L (ref 0–70)
ANION GAP SERPL CALCULATED.3IONS-SCNC: 11 MMOL/L (ref 7–15)
AST SERPL W P-5'-P-CCNC: 18 U/L (ref 0–45)
BILIRUB SERPL-MCNC: 0.5 MG/DL
BUN SERPL-MCNC: 18.2 MG/DL (ref 8–23)
CALCIUM SERPL-MCNC: 10 MG/DL (ref 8.8–10.4)
CHLORIDE SERPL-SCNC: 104 MMOL/L (ref 98–107)
CREAT SERPL-MCNC: 0.68 MG/DL (ref 0.67–1.17)
EGFRCR SERPLBLD CKD-EPI 2021: >90 ML/MIN/1.73M2
ERYTHROCYTE [DISTWIDTH] IN BLOOD BY AUTOMATED COUNT: 13.6 % (ref 10–15)
GLUCOSE SERPL-MCNC: 201 MG/DL (ref 70–99)
HCO3 SERPL-SCNC: 24 MMOL/L (ref 22–29)
HCT VFR BLD AUTO: 44.9 % (ref 40–53)
HGB BLD-MCNC: 15.2 G/DL (ref 13.3–17.7)
HOLD SPECIMEN: NORMAL
MAGNESIUM SERPL-MCNC: 2.2 MG/DL (ref 1.7–2.3)
MCH RBC QN AUTO: 31.2 PG (ref 26.5–33)
MCHC RBC AUTO-ENTMCNC: 33.9 G/DL (ref 31.5–36.5)
MCV RBC AUTO: 92 FL (ref 78–100)
NT-PROBNP SERPL-MCNC: <36 PG/ML (ref 0–900)
PLATELET # BLD AUTO: 225 10E3/UL (ref 150–450)
POTASSIUM SERPL-SCNC: 4.3 MMOL/L (ref 3.4–5.3)
PROT SERPL-MCNC: 8.2 G/DL (ref 6.4–8.3)
RBC # BLD AUTO: 4.87 10E6/UL (ref 4.4–5.9)
SODIUM SERPL-SCNC: 139 MMOL/L (ref 135–145)
TROPONIN T SERPL HS-MCNC: 8 NG/L
TROPONIN T SERPL HS-MCNC: 9 NG/L
WBC # BLD AUTO: 9.4 10E3/UL (ref 4–11)

## 2025-04-08 PROCEDURE — 93005 ELECTROCARDIOGRAM TRACING: CPT

## 2025-04-08 PROCEDURE — 71045 X-RAY EXAM CHEST 1 VIEW: CPT

## 2025-04-08 PROCEDURE — 94644 CONT INHLJ TX 1ST HOUR: CPT

## 2025-04-08 PROCEDURE — 999N000157 HC STATISTIC RCP TIME EA 10 MIN

## 2025-04-08 PROCEDURE — 84484 ASSAY OF TROPONIN QUANT: CPT | Performed by: NURSE PRACTITIONER

## 2025-04-08 PROCEDURE — 71045 X-RAY EXAM CHEST 1 VIEW: CPT | Mod: 26 | Performed by: RADIOLOGY

## 2025-04-08 PROCEDURE — 85014 HEMATOCRIT: CPT | Performed by: NURSE PRACTITIONER

## 2025-04-08 PROCEDURE — 83880 ASSAY OF NATRIURETIC PEPTIDE: CPT | Performed by: NURSE PRACTITIONER

## 2025-04-08 PROCEDURE — 93010 ELECTROCARDIOGRAM REPORT: CPT | Performed by: INTERNAL MEDICINE

## 2025-04-08 PROCEDURE — 36415 COLL VENOUS BLD VENIPUNCTURE: CPT | Performed by: NURSE PRACTITIONER

## 2025-04-08 PROCEDURE — 83735 ASSAY OF MAGNESIUM: CPT | Performed by: NURSE PRACTITIONER

## 2025-04-08 PROCEDURE — 250N000009 HC RX 250: Performed by: NURSE PRACTITIONER

## 2025-04-08 PROCEDURE — 99284 EMERGENCY DEPT VISIT MOD MDM: CPT | Performed by: NURSE PRACTITIONER

## 2025-04-08 PROCEDURE — 80048 BASIC METABOLIC PNL TOTAL CA: CPT | Performed by: NURSE PRACTITIONER

## 2025-04-08 PROCEDURE — 99285 EMERGENCY DEPT VISIT HI MDM: CPT | Mod: 25

## 2025-04-08 RX ORDER — ALBUTEROL SULFATE 5 MG/ML
15 SOLUTION, NON-ORAL INHALATION CONTINUOUS
Status: DISPENSED | OUTPATIENT
Start: 2025-04-08 | End: 2025-04-08

## 2025-04-08 RX ORDER — PREDNISONE 20 MG/1
60 TABLET ORAL DAILY
Qty: 15 TABLET | Refills: 0 | Status: SHIPPED | OUTPATIENT
Start: 2025-04-08 | End: 2025-04-13

## 2025-04-08 RX ORDER — BUDESONIDE 0.5 MG/2ML
0.5 INHALANT ORAL ONCE
Status: COMPLETED | OUTPATIENT
Start: 2025-04-08 | End: 2025-04-08

## 2025-04-08 RX ORDER — ALBUTEROL SULFATE 0.83 MG/ML
2.5 SOLUTION RESPIRATORY (INHALATION) EVERY 4 HOURS PRN
Qty: 75 ML | Refills: 0 | Status: SHIPPED | OUTPATIENT
Start: 2025-04-08

## 2025-04-08 RX ADMIN — ALBUTEROL SULFATE 15 MG/HR: 2.5 SOLUTION RESPIRATORY (INHALATION) at 11:25

## 2025-04-08 RX ADMIN — BUDESONIDE INHALATION 0.5 MG: 0.5 SUSPENSION RESPIRATORY (INHALATION) at 11:26

## 2025-04-08 ASSESSMENT — COLUMBIA-SUICIDE SEVERITY RATING SCALE - C-SSRS
6. HAVE YOU EVER DONE ANYTHING, STARTED TO DO ANYTHING, OR PREPARED TO DO ANYTHING TO END YOUR LIFE?: NO
2. HAVE YOU ACTUALLY HAD ANY THOUGHTS OF KILLING YOURSELF IN THE PAST MONTH?: NO
1. IN THE PAST MONTH, HAVE YOU WISHED YOU WERE DEAD OR WISHED YOU COULD GO TO SLEEP AND NOT WAKE UP?: NO

## 2025-04-08 ASSESSMENT — ENCOUNTER SYMPTOMS
SHORTNESS OF BREATH: 1
ENDOCRINE NEGATIVE: 1
CONSTITUTIONAL NEGATIVE: 1
HEMATOLOGIC/LYMPHATIC NEGATIVE: 1
CARDIOVASCULAR NEGATIVE: 1
MUSCULOSKELETAL NEGATIVE: 1
PSYCHIATRIC NEGATIVE: 1
EYES NEGATIVE: 1
ALLERGIC/IMMUNOLOGIC NEGATIVE: 1
NEUROLOGICAL NEGATIVE: 1
GASTROINTESTINAL NEGATIVE: 1

## 2025-04-08 ASSESSMENT — ACTIVITIES OF DAILY LIVING (ADL)
ADLS_ACUITY_SCORE: 48

## 2025-04-08 NOTE — Clinical Note
Ry Man was seen and treated in our emergency department on 4/8/2025.  He may return to work on 04/11/2025.       If you have any questions or concerns, please don't hesitate to call.      Eliseo Gonzalez, LEONOR CNP

## 2025-04-08 NOTE — ED PROVIDER NOTES
"  History     Chief Complaint   Patient presents with    Shortness of Breath     HPI  Ry Man is a 59 year old individual with history of hypertension, COPD, DMT2, comes in for shortness of breath.  Patient states has been having increasing shortness of breath for the past 6 days.  States that he has been having this sporadically and was actually seen on 3/17/2025 and hospitalized for this.  States it was a COPD exacerbation.  No fever or chills.  No cough out of the ordinary as patient is a smoker.  No chest pain.  No dizziness or lightheadedness.  Patient states has standing order for prednisone so took 40 mg p.o. this morning.    Allergies:  Allergies   Allergen Reactions    Homeopathic Products      \"Ethyl Alcohol\"    Lisinopril Other (See Comments)     Cough      Statins     Valsartan Rash and GI Disturbance     Diovan       Problem List:    Patient Active Problem List    Diagnosis Date Noted    Viral URI 03/12/2025     Priority: Medium    Hypoxia 03/12/2025     Priority: Medium    COPD with acute exacerbation (H) 03/12/2025     Priority: Medium    Stage 3 severe COPD by GOLD classification (H) 05/24/2023     Priority: Medium    Diabetes mellitus, type 2 (H) 05/02/2023     Priority: Medium    Hyperlipidemia 03/12/2020     Priority: Medium    Acute respiratory failure with hypoxia and hypercapnia (H) 02/27/2020     Priority: Medium    Acute pulmonary embolism (H) 02/27/2020     Priority: Medium    Chronic obstructive pulmonary disease with acute exacerbation (H) 02/18/2020     Priority: Medium    Acute respiratory failure with hypoxia (H) 02/15/2020     Priority: Medium    Essential hypertension 02/15/2020     Priority: Medium    Respiratory failure (H) 02/15/2020     Priority: Medium    Hyperuricemia 04/07/2015     Priority: Medium    ED (erectile dysfunction) 02/11/2015     Priority: Medium    Tobacco abuse 03/04/2014     Priority: Medium        Past Medical History:    Past Medical History:   Diagnosis " Date    Abnormal liver function test 2011    Bronchitis, not specified as acute or chronic 2005    Osteoarthritis 2011    Prediabetes     Tobacco abuse 2011    Unspecified essential hypertension 2006       Past Surgical History:    Past Surgical History:   Procedure Laterality Date    cysts removed from neck      Bilateral    vasectomy      wisdom teeth extraction         Family History:    Family History   Problem Relation Age of Onset    Heart Failure Father 72        CHF, cause of death    Diabetes Father     C.A.D. Mother     Diabetes Mother     C.A.D. Brother 51        Cause of death    Diabetes Brother     Hypertension Brother     Other - See Comments Brother         Multiple Sclerosis       Social History:  Marital Status:   [2]  Social History     Tobacco Use    Smoking status: Former     Current packs/day: 0.00     Average packs/day: 1 pack/day for 40.1 years (40.1 ttl pk-yrs)     Types: Cigarettes     Start date: 1980     Quit date: 2020     Years since quittin.1     Passive exposure: Past    Smokeless tobacco: Never    Tobacco comments:     pt quit 20   Vaping Use    Vaping status: Never Used   Substance Use Topics    Alcohol use: No    Drug use: No        Medications:    albuterol (PROAIR HFA/PROVENTIL HFA/VENTOLIN HFA) 108 (90 Base) MCG/ACT inhaler  albuterol (PROVENTIL) (2.5 MG/3ML) 0.083% neb solution  albuterol (PROVENTIL) (2.5 MG/3ML) 0.083% neb solution  amLODIPine (NORVASC) 10 MG tablet  ascorbic acid 1000 MG TABS tablet  Bacillus Coagulans-Inulin (PROBIOTIC) 1-250 BILLION-MG CAPS  carvedilol (COREG) 6.25 MG tablet  cloNIDine (CATAPRES-TTS3) 0.3 MG/24HR WK patch  Glucosamine-Chondroit-Vit C-Mn (GLUCOSAMINE CHONDROITIN 1500 COMPLEX) CAPS  hydrochlorothiazide (HYDRODIURIL) 25 MG tablet  ipratropium - albuterol 0.5 mg/2.5 mg/3 mL (DUONEB) 0.5-2.5 (3) MG/3ML neb solution  Plant Sterols and Stanols 450 MG TABS  predniSONE (DELTASONE) 20 MG tablet  STIOLTO  RESPIMAT 2.5-2.5 MCG/ACT AERS          Review of Systems   Constitutional: Negative.    HENT: Negative.     Eyes: Negative.    Respiratory:  Positive for shortness of breath.    Cardiovascular: Negative.    Gastrointestinal: Negative.    Endocrine: Negative.    Genitourinary: Negative.    Musculoskeletal: Negative.    Skin: Negative.    Allergic/Immunologic: Negative.    Neurological: Negative.    Hematological: Negative.    Psychiatric/Behavioral: Negative.         Physical Exam   BP: (!) 185/125  Pulse: 86  Temp: 98.3  F (36.8  C)  Resp: 18  Weight: 84.4 kg (185 lb 15.3 oz)  SpO2: 92 %      GENERAL APPEARANCE:  The patient is a 59 year old well-developed, well-nourished individual that appears as stated age.  NECK:  Supple.  Trachea is midline.  CHEST:  Symmetric.  Non-tender to palpation.  No crepitus or deformity.  LUNGS: Tachypneic.  Breath sounds dim but does have noted expiratory wheezes throughout.   HEART:  Regular rate and rhythm with normal S1 and S2.  No murmurs, gallops, or rubs.  ABDOMEN:  No abdominal bruits or thrills present upon auscultation/palpation.  EXTREMITIES:  No cyanosis, clubbing, or edema.   PSYCHIATRIC:  The patient is awake, alert, and oriented x4.  Recent and remote memory is intact.  Appropriate mood and affect.  Calm and cooperative with history and physical exam.  SKIN:  Warm, dry, and well perfused.  Good turgor.  No lesions, nodules, or rashes are noted.  No bruising noted.       ED Course     ED Course as of 04/08/25 1316   Tue Apr 08, 2025   1106 Labs, ECG, chest x-ray ordered.   1106 In to see patient and history/physical completed.    1106 15 mg albuterol nebulizer ordered to go over 1 hour.  Pulmicort 0.5 mg also ordered.   1120 EKG 12-lead, tracing only  No acute findings on ECG.   1316 No acute findings on labs, imaging, ECG.  Patient doing better after continuous neb.  Will discharge home on steroid burst consisting of prednisone 60 mg daily x 5 days.  Nebulizers every 4  hours as needed.  Close follow-up with PCP.  Return precautions given.            ECG:    ECG competed at 1117 and personally reviewed at 1120 showing sinus rhythm with ventricular rate of 76 and QTc of 452.  Normal axis.  Normal ECG.  When compared to ECG from 3/12/2025, no significant changes noted.         Results for orders placed or performed during the hospital encounter of 04/08/25 (from the past 24 hours)   EKG 12-lead, tracing only   Result Value Ref Range    Systolic Blood Pressure  mmHg    Diastolic Blood Pressure  mmHg    Ventricular Rate 76 BPM    Atrial Rate 76 BPM    MO Interval 194 ms    QRS Duration 92 ms     ms    QTc 452 ms    P Axis 86 degrees    R AXIS 71 degrees    T Axis 71 degrees    Interpretation ECG       Sinus rhythm  Normal ECG  When compared with ECG of 12-Mar-2025 09:31,  No significant change was found     CBC with platelets   Result Value Ref Range    WBC Count 9.4 4.0 - 11.0 10e3/uL    RBC Count 4.87 4.40 - 5.90 10e6/uL    Hemoglobin 15.2 13.3 - 17.7 g/dL    Hematocrit 44.9 40.0 - 53.0 %    MCV 92 78 - 100 fL    MCH 31.2 26.5 - 33.0 pg    MCHC 33.9 31.5 - 36.5 g/dL    RDW 13.6 10.0 - 15.0 %    Platelet Count 225 150 - 450 10e3/uL   Comprehensive metabolic panel   Result Value Ref Range    Sodium 139 135 - 145 mmol/L    Potassium 4.3 3.4 - 5.3 mmol/L    Carbon Dioxide (CO2) 24 22 - 29 mmol/L    Anion Gap 11 7 - 15 mmol/L    Urea Nitrogen 18.2 8.0 - 23.0 mg/dL    Creatinine 0.68 0.67 - 1.17 mg/dL    GFR Estimate >90 >60 mL/min/1.73m2    Calcium 10.0 8.8 - 10.4 mg/dL    Chloride 104 98 - 107 mmol/L    Glucose 201 (H) 70 - 99 mg/dL    Alkaline Phosphatase 80 40 - 150 U/L    AST 18 0 - 45 U/L    ALT 20 0 - 70 U/L    Protein Total 8.2 6.4 - 8.3 g/dL    Albumin 4.5 3.5 - 5.2 g/dL    Bilirubin Total 0.5 <=1.2 mg/dL   Troponin T, High Sensitivity   Result Value Ref Range    Troponin T, High Sensitivity 9 <=22 ng/L   Magnesium   Result Value Ref Range    Magnesium 2.2 1.7 - 2.3 mg/dL    Nt probnp inpatient (BNP)   Result Value Ref Range    N terminal Pro BNP Inpatient <36 0 - 900 pg/mL   Extra Tube    Narrative    The following orders were created for panel order Extra Tube.  Procedure                               Abnormality         Status                     ---------                               -----------         ------                     Extra Blue Top Tube[[id number]]                             Final result               Extra Red Top Tube[[id number]]                              Final result               Extra Heparinized Syringe[[id number]]                       Final result                 Please view results for these tests on the individual orders.   Extra Blue Top Tube   Result Value Ref Range    Hold Specimen JIC    Extra Red Top Tube   Result Value Ref Range    Hold Specimen JIC    Extra Heparinized Syringe   Result Value Ref Range    Hold Specimen JIC    XR Chest Port 1 View    Narrative    PROCEDURE:  XR CHEST PORT 1 VIEW    HISTORY: dyspnea with exertion, SOB, wheezes, COPD. .    COMPARISON:  3/12/2025    FINDINGS:    The cardiomediastinal contours are stable.  No focal consolidation, effusion or pneumothorax.      Impression    IMPRESSION:  Stable chest.      ADINA FORMAN MD         SYSTEM ID:  [id number]       Medications   albuterol (PROVENTIL) continous nebulization (15 mg/hr Nebulization $New Bag 4/8/25 1125)   budesonide (PULMICORT) neb solution 0.5 mg (0.5 mg Nebulization $Given 4/8/25 1126)       Assessments & Plan (with Medical Decision Making)     I have reviewed the nursing notes.    I have reviewed the findings, diagnosis, plan and need for follow up with the patient.    Summary:  Patient presents to the ER today for shortness of breath.  Potential diagnosis which have been considered and evaluated include pneumonia, pneumothorax, MI/NSTEMI, arrhythmia, viral illness, COPD exacerbation, as well as others. Many of these have been excluded using the various  modalities and assessment as noted on the chart. At the present time, the diagnosis given seems to be the most likely COPD exacerbation.  Upon arrival, vitals signs show blood pressure 185/125 with a pulse of 86.  Temperature 98.3  F.  Respirations 18 with oxygenation of 92% on room air.  The patient is alert and oriented and very talkative.  Patient does have tachypnea.  Lung sounds have expiratory wheezes throughout but are dim.  Cardiac examination normal.  ECG obtained showing no acute abnormalities.  Lab work obtained showing WBC of 9.4 with hemoglobin of 15.2.  Electrolytes, renal, hepatic functions normal.  BNP negative.  High-sensitivity troponin 9 making ACS unlikely as this has been going on for multiple days.  Chest x-ray personally reviewed showing no acute cardiopulmonary abnormalities.  Hour-long albuterol nebulizer consisting of 15 mg in addition to Pulmicort 0.5 mg given in the ER with improvement of symptoms.  Patient did take prednisone 40 mg p.o this morning so steroids were not given in the ER.  At this time patient likely has COPD exacerbation.  Has been doing nebulizers at home.  Will have patient continue this every 4 hours as needed and may increase to 2 hours as needed.  Patient started steroids today so we will continue but at a higher dose consisting of prednisone 60 mg daily x 5 more days.  Close follow-up with PCP.  Discussed pulmonology referral but patient will speak to that clinic and have it done through their due to cost.  Did contact patient's primary care for follow-up per patient request.        Critical Care Time: None    Impression and plan discussed with patient. Questions answered, concerns addressed, indications for urgent re-evaluation reviewed, and  given. Patient/Parent/Caregiver agree with treatment plan and have no further questions at this time.  AVS provided at discharge.    This document was prepared using a combination of typing and voice generated software.   While every attempt was made for accuracy, spelling and grammatical errors may exist.              New Prescriptions    ALBUTEROL (PROVENTIL) (2.5 MG/3ML) 0.083% NEB SOLUTION    Take 1 vial (2.5 mg) by nebulization every 4 hours as needed for shortness of breath or wheezing.    PREDNISONE (DELTASONE) 20 MG TABLET    Take 3 tablets (60 mg) by mouth daily for 5 days. Take two tablets (= 40mg) each day for 5 (five) days       Final diagnoses:   COPD exacerbation (H)       4/8/2025   [facility]       Eliseo Gonzalez APRN CNP  04/08/25 5802

## 2025-04-08 NOTE — TELEPHONE ENCOUNTER
Symptom or reason needing to speak to RN: [facility] ER follow up needed visit on 04/08. Patient was having difficulty breathing.     Best number to return call: [phone]      Best time to return call: Anytime

## 2025-04-08 NOTE — ED TRIAGE NOTES
Pt presents with c/o SOB that began 6 days ago. Pt attempted to make an appointment with his primary today and was told to go to the ER.

## 2025-04-08 NOTE — DISCHARGE INSTRUCTIONS
Start prednisone 60 mg daily on 4/9/25.    Albuterol nebulizers every 4 hours as needed.  If you are having continued shortness of breath you can increase this to every 2.  If continued shortness of breath do not hesitate to return to the ER.    Discussed pul[facility]ology referral with your [facility] care provider/[facility] doctor.

## 2025-04-09 LAB
ATRIAL RATE - MUSE: 76 BPM
DIASTOLIC BLOOD PRESSURE - MUSE: NORMAL MMHG
INTERPRETATION ECG - MUSE: NORMAL
P AXIS - MUSE: 86 DEGREES
PR INTERVAL - MUSE: 194 MS
QRS DURATION - MUSE: 92 MS
QT - MUSE: 402 MS
QTC - MUSE: 452 MS
R AXIS - MUSE: 71 DEGREES
SYSTOLIC BLOOD PRESSURE - MUSE: NORMAL MMHG
T AXIS - MUSE: 71 DEGREES
VENTRICULAR RATE- MUSE: 76 BPM

## 2025-04-09 NOTE — TELEPHONE ENCOUNTER
Attempted to call patient to scheduled follow up. No answer. Left message for patient to call clinic back.

## 2025-05-08 ENCOUNTER — TRANSFERRED RECORDS (OUTPATIENT)
Dept: MULTI SPECIALTY CLINIC | Facility: CLINIC | Age: 60
End: 2025-05-08

## 2025-05-08 LAB — RETINOPATHY: NORMAL

## 2025-05-11 ENCOUNTER — HEALTH MAINTENANCE LETTER (OUTPATIENT)
Age: 60
End: 2025-05-11

## 2025-05-12 ENCOUNTER — HOSPITAL ENCOUNTER (OUTPATIENT)
Dept: RESPIRATORY THERAPY | Facility: HOSPITAL | Age: 60
Discharge: HOME | End: 2025-05-12
Attending: FAMILY MEDICINE
Payer: COMMERCIAL

## 2025-05-12 DIAGNOSIS — J44.1 CHRONIC OBSTRUCTIVE PULMONARY DISEASE WITH ACUTE EXACERBATION (H): Primary | ICD-10-CM

## 2025-05-12 LAB
DLCOCOR-%PRED-PRE: 46 %
DLCOCOR-PRE: 13.77 ML/MIN/MMHG
DLCOUNC-%PRED-PRE: 46 %
DLCOUNC-PRE: 13.77 ML/MIN/MMHG
DLCOUNC-PRED: 29.69 ML/MIN/MMHG
ERV-%PRED-PRE: 83 %
ERV-PRE: 1.44 L
ERV-PRED: 1.73 L
EXPTIME-PRE: 15.1 SEC
FEF2575-%PRED-POST: 12 %
FEF2575-%PRED-PRE: 7 %
FEF2575-POST: 0.37 L/SEC
FEF2575-PRE: 0.23 L/SEC
FEF2575-PRED: 2.94 L/SEC
FEFMAX-%PRED-PRE: 21 %
FEFMAX-PRE: 2.14 L/SEC
FEFMAX-PRED: 9.78 L/SEC
FEV1-%PRED-PRE: 24 %
FEV1-PRE: 0.87 L
FEV1FEV6-PRE: 39 %
FEV1FEV6-PRED: 79 %
FEV1FVC-PRE: 26 %
FEV1FVC-PRED: 78 %
FEV1SVC-PRE: 24 %
FEV1SVC-PRED: 76 %
FIFMAX-PRE: 4.2 L/SEC
FRCPLETH-%PRED-PRE: 161 %
FRCPLETH-PRE: 6.49 L
FRCPLETH-PRED: 4.02 L
FVC-%PRED-PRE: 72 %
FVC-PRE: 3.38 L
FVC-PRED: 4.66 L
GAW-%PRED-PRE: 32 %
GAW-PRE: 0.33 L/S/CMH2O
GAW-PRED: 1.03 L/S/CMH2O
HGB BLD-MCNC: 14.6 G/DL (ref 13.3–17.7)
IC-%PRED-PRE: 58 %
IC-PRE: 2.02 L
IC-PRED: 3.45 L
RVPLETH-%PRED-PRE: 191 %
RVPLETH-PRE: 4.91 L
RVPLETH-PRED: 2.56 L
SGAW-%PRED-PRE: 53 %
SGAW-PRE: 0.04 1/CMH2O*S
SGAW-PRED: 0.08 1/CMH2O*S
SRAW-%PRED-PRE: 471 %
SRAW-PRE: 22.46 CMH2O*S
SRAW-PRED: 4.76 CMH2O*S
TLCPLETH-%PRED-PRE: 109 %
TLCPLETH-PRE: 8.51 L
TLCPLETH-PRED: 7.74 L
VA-%PRED-PRE: 80 %
VA-PRE: 5.81 L
VC-%PRED-PRE: 75 %
VC-PRE: 3.6 L
VC-PRED: 4.75 L

## 2025-05-12 PROCEDURE — 94729 DIFFUSING CAPACITY: CPT

## 2025-05-12 PROCEDURE — 94726 PLETHYSMOGRAPHY LUNG VOLUMES: CPT

## 2025-05-12 PROCEDURE — 250N000009 HC RX 250: Performed by: FAMILY MEDICINE

## 2025-05-12 PROCEDURE — 85018 HEMOGLOBIN: CPT | Performed by: FAMILY MEDICINE

## 2025-05-12 PROCEDURE — 36415 COLL VENOUS BLD VENIPUNCTURE: CPT | Performed by: FAMILY MEDICINE

## 2025-05-12 PROCEDURE — 94060 EVALUATION OF WHEEZING: CPT

## 2025-05-12 RX ORDER — ALBUTEROL SULFATE 0.83 MG/ML
2.5 SOLUTION RESPIRATORY (INHALATION) ONCE
Status: COMPLETED | OUTPATIENT
Start: 2025-05-12 | End: 2025-05-12

## 2025-05-12 RX ADMIN — ALBUTEROL SULFATE 2.5 MG: 2.5 SOLUTION RESPIRATORY (INHALATION) at 16:07

## 2025-05-17 LAB
DLCOCOR-%PRED-PRE: 46 %
DLCOCOR-PRE: 13.77 ML/MIN/MMHG
DLCOUNC-%PRED-PRE: 46 %
DLCOUNC-PRE: 13.77 ML/MIN/MMHG
DLCOUNC-PRED: 29.69 ML/MIN/MMHG
ERV-%PRED-PRE: 83 %
ERV-PRE: 1.44 L
ERV-PRED: 1.73 L
EXPTIME-PRE: 15.1 SEC
FEF2575-%PRED-POST: 12 %
FEF2575-%PRED-PRE: 7 %
FEF2575-POST: 0.37 L/SEC
FEF2575-PRE: 0.23 L/SEC
FEF2575-PRED: 2.94 L/SEC
FEFMAX-%PRED-PRE: 21 %
FEFMAX-PRE: 2.14 L/SEC
FEFMAX-PRED: 9.78 L/SEC
FEV1-%PRED-PRE: 24 %
FEV1-PRE: 0.87 L
FEV1FEV6-PRE: 39 %
FEV1FEV6-PRED: 79 %
FEV1FVC-PRE: 26 %
FEV1FVC-PRED: 78 %
FEV1SVC-PRE: 24 %
FEV1SVC-PRED: 76 %
FIFMAX-PRE: 4.2 L/SEC
FRCPLETH-%PRED-PRE: 161 %
FRCPLETH-PRE: 6.49 L
FRCPLETH-PRED: 4.02 L
FVC-%PRED-PRE: 72 %
FVC-PRE: 3.38 L
FVC-PRED: 4.66 L
GAW-%PRED-PRE: 32 %
GAW-PRE: 0.33 L/S/CMH2O
GAW-PRED: 1.03 L/S/CMH2O
IC-%PRED-PRE: 58 %
IC-PRE: 2.02 L
IC-PRED: 3.45 L
RVPLETH-%PRED-PRE: 191 %
RVPLETH-PRE: 4.91 L
RVPLETH-PRED: 2.56 L
SGAW-%PRED-PRE: 53 %
SGAW-PRE: 0.04 1/CMH2O*S
SGAW-PRED: 0.08 1/CMH2O*S
SRAW-%PRED-PRE: 471 %
SRAW-PRE: 22.46 CMH2O*S
SRAW-PRED: 4.76 CMH2O*S
TLCPLETH-%PRED-PRE: 109 %
TLCPLETH-PRE: 8.51 L
TLCPLETH-PRED: 7.74 L
VA-%PRED-PRE: 80 %
VA-PRE: 5.81 L
VC-%PRED-PRE: 75 %
VC-PRE: 3.6 L
VC-PRED: 4.75 L

## 2025-05-19 ENCOUNTER — HOSPITAL ENCOUNTER (OUTPATIENT)
Dept: ULTRASOUND IMAGING | Facility: HOSPITAL | Age: 60
Discharge: HOME | End: 2025-05-19
Attending: NURSE PRACTITIONER | Admitting: STUDENT IN AN ORGANIZED HEALTH CARE EDUCATION/TRAINING PROGRAM
Payer: COMMERCIAL

## 2025-05-19 ENCOUNTER — HOSPITAL ENCOUNTER (OUTPATIENT)
Dept: CARDIOLOGY | Facility: HOSPITAL | Age: 60
Discharge: HOME | End: 2025-05-19
Attending: NURSE PRACTITIONER | Admitting: INTERNAL MEDICINE
Payer: COMMERCIAL

## 2025-05-19 DIAGNOSIS — H34.219 PARTIAL RETINAL ARTERY OCCLUSION, UNSPECIFIED EYE: ICD-10-CM

## 2025-05-19 LAB — LVEF ECHO: NORMAL

## 2025-05-19 PROCEDURE — 93880 EXTRACRANIAL BILAT STUDY: CPT

## 2025-05-19 PROCEDURE — 93880 EXTRACRANIAL BILAT STUDY: CPT | Mod: 26 | Performed by: STUDENT IN AN ORGANIZED HEALTH CARE EDUCATION/TRAINING PROGRAM

## 2025-05-19 PROCEDURE — 93306 TTE W/DOPPLER COMPLETE: CPT | Mod: 26 | Performed by: INTERNAL MEDICINE

## 2025-05-19 PROCEDURE — 93306 TTE W/DOPPLER COMPLETE: CPT

## 2025-05-30 ENCOUNTER — RESULTS FOLLOW-UP (OUTPATIENT)
Dept: FAMILY MEDICINE | Facility: OTHER | Age: 60
End: 2025-05-30

## 2025-05-30 PROBLEM — H90.3 SENSORINEURAL HEARING LOSS, BILATERAL: Status: ACTIVE | Noted: 2025-05-30

## 2025-05-30 PROBLEM — I71.40 ABDOMINAL AORTIC ANEURYSM: Status: ACTIVE | Noted: 2024-06-12

## 2025-05-30 PROBLEM — H93.13 BILATERAL TINNITUS: Status: ACTIVE | Noted: 2025-05-30

## 2025-05-30 PROBLEM — K21.9 GASTROESOPHAGEAL REFLUX DISEASE: Status: ACTIVE | Noted: 2025-05-30

## 2025-05-30 PROBLEM — Z77.29 EXPOSURE TO POTENTIALLY HAZARDOUS SUBSTANCE: Status: ACTIVE | Noted: 2024-03-07

## 2025-06-06 ENCOUNTER — TELEPHONE (OUTPATIENT)
Dept: FAMILY MEDICINE | Facility: OTHER | Age: 60
End: 2025-06-06

## 2025-06-06 NOTE — TELEPHONE ENCOUNTER
Patient stopped into the clinic. He stated he got an email from his job stating that [employer] chose him to return to work on June 7th 2025. He just receive this form yesterday, he stated he copied what you had put on his previous form.     I put the form on your desk. He has been completed by the patient.

## 2025-06-27 NOTE — PROGRESS NOTES
Assessment & Plan     Type 2 diabetes mellitus without complication, without long-term current use of insulin (H)  A1c at goal.  Diet controlled.  Foot exam due - will need to do next visit - spent majority of time talking about disability evaluation.  - Hemoglobin A1c; Future  - Hemoglobin A1c    Essential hypertension  BP high here.  At home readings at goal.  Pulse at home 40s, 1 reading of 39 - symptomatic.  Reduce Coreg.  Consider ziopatch next and cardiology consult.  - carvedilol (COREG) 3.125 MG tablet; Take 1 tablet (3.125 mg) by mouth 2 times daily (with meals).    Hyperlipidemia, unspecified hyperlipidemia type  Statin intolerant.  Will advise zetia if agreeable.  - Lipid Profile (Chol, Trig, HDL, LDL calc); Future  - Lipid Profile (Chol, Trig, HDL, LDL calc)    Statin intolerance    Chronic obstructive pulmonary disease, unspecified COPD type (H)  Significant improvement with current regimen - asmanex, duonebs, albuterol.  Helpful to avoid triggering environment - heat/humidity/dust.  Had recent PFT - severe.  Is in process [facility]  He said he was told it would likely be August.  His S& A runs out next month.  His COPD is significantly better.  He is looking more for permanent disability.  Difficult to follow case.  References issues with shift work, divorce situation, insurance, etc.  However, ultimately, if seeking permanent disability needs to go through independent provider, such as [facility].  He may also be able to get disability ratings through his [facility] system.  He seems quite lost to steps.  Will place care coordination referral to see if able to give some references, guidance.  - albuterol (PROVENTIL) (2.5 MG/3ML) 0.083% neb solution; Take 1 vial (2.5 mg) by nebulization every 4 hours as needed for shortness of breath or wheezing.      Chronic obstructive pulmonary disease with acute exacerbation (H)  As above  - ipratropium - albuterol  "0.5 mg/2.5 mg, 3mg,/3 mL (DUONEB) 0.5-2.5 (3) MG/3ML neb solution; Take 1 vial (3 mLs) by nebulization every 4 hours as needed for wheezing.          BMI  Estimated body mass index is 25.05 kg/m  as calculated from the following:    Height as of 5/9/25: 1.854 m (6' 1\").    Weight as of this encounter: 86.1 kg (189 lb 14.4 oz).   Weight management plan: Discussed healthy diet and exercise guidelines      Follow-up  No follow-ups on file.    Subjective   Ry is a 59 year old, presenting for the following health issues:  COPD and Forms        6/30/2025     2:15 PM   Additional Questions   Roomed by Leydi CHRISTIANSON         6/30/2025   Forms   Any forms needing to be completed Yes     HPI      Labs - A1c and fasting lipids -  today - but not fasting    FMLA - ? Might purse this.  Has S & A time currently.  Wants permanent disability.    Vaccines - declines    Tobacco - quit 5 years ago    Sleeping over 12 hours.  Tired.  [facility] - ?unsure if he saw them    Divorce papers - served to him    Social work - disability -   Care coordination - referral placed today.    The longitudinal plan of care for the diagnosis(es)/condition(s) as documented were addressed during this visit. Due to the added complexity in care, I will continue to support Ry in the subsequent management and with ongoing continuity of care.  32 minutes spent by me on the date of the encounter doing chart review, history and exam, documentation and further activities per the note    Diabetes Follow-up  How often are you checking your blood sugar? Not at all  What concerns do you have today about your diabetes? None   Do you have any of these symptoms? (Select all that apply)  No numbness or tingling in feet.  No redness, sores or blisters on feet.  No complaints of excessive thirst.  No reports of blurry vision.  No significant changes to weight.  Have you had a diabetic eye exam in the last 12 months? Yes- Date of last eye exam: 5/8/25,  [facility]      Hyperlipidemia Follow-Up - fasting lipids due; coffee and breakfast - burrito; few hours ago  Are you regularly taking any medication or supplement to lower your cholesterol?   No  Are you having muscle aches or other side effects that you think could be caused by your cholesterol lowering medication?  No    Hypertension Follow-up - coreg 6.25 bid; hydrochlorothiazide 25, norvasc 10, clonidine patch 0.3.  Do you check your blood pressure regularly outside of the clinic? Yes   Are you following a low salt diet? Yes  Are your blood pressures ever more than 140 on the top number (systolic) OR more than 90 on the bottom number (diastolic), for example 140/90? No  Normal numbers at home - 110s-120s/602-70s - shows me on phone  Pulse 40s/50s  1 day was 39 - upon awakening felt sluggish  Morning medications - feels low after;  Has been not taking AM dose of Coreg and taking PM dose    BP Readings from Last 2 Encounters:   06/30/25 (!) 166/84   05/30/25 132/82     Hemoglobin A1C (%)   Date Value   03/13/2025 6.7 (H)   07/22/2024 6.6 (H)   03/13/2020 6.4 (H)   02/16/2020 6.4 (H)     LDL Cholesterol Calculated (mg/dL)   Date Value   07/22/2024 177 (H)   08/10/2023 186 (H)   03/13/2020 86   05/07/2013 129         COPD Follow-Up  Overall, how are your COPD symptoms since your last clinic visit?  No change  How much fatigue or shortness of breath do you have when you are walking?  More than usual  How much shortness of breath do you have when you are resting?  None  How often do you cough? All the time  Have you noticed any change in your sputum/phlegm?  Yes- mucus plug gets stuck or he can't get it coughed up  Have you experienced a recent fever? No  Please describe how far you can walk without stopping to rest:  1-2 miles  How many flights of stairs are you able to walk up without stopping?  2  Have you had any Emergency Room Visits, Urgent Care Visits, or Hospital Admissions because of your COPD  "since your last office visit?  No  PFTs - done and reviewed last visit - severe airway obstruction; diffusion defect; possible concurrent restrictive process  [facility]25  Did steroid taper  No on duoneb; stiolto; asmanex   Day shift, night shift  S&A until 7/23/25    History   Smoking Status    Former    Types: Cigarettes   Smokeless Tobacco    Never     No results found for: \"FEV1\", \"[id number]\"      Review of Systems  Constitutional, HEENT, cardiovascular, pulmonary, gi and gu systems are negative, except as otherwise noted.      Objective    BP (!) 166/84 (BP Location: Left arm, Patient Position: Sitting)   Pulse 63   Temp 98.7  F (37.1  C) (Tympanic)   Wt 86.1 kg (189 lb 14.4 oz)   SpO2 94%   BMI 25.05 kg/m    Body mass index is 25.05 kg/m .  Physical Exam   GENERAL: alert and no distress  NECK: no adenopathy, no asymmetry, masses, or scars  RESP: lungs clear to auscultation - no rales, rhonchi or wheezes  CV: regular rate and rhythm, normal S1 S2, no S3 or S4, no murmur, click or rub, no peripheral edema  ABDOMEN: soft, nontender, no hepatosplenomegaly, no masses and bowel sounds normal  MS: no gross musculoskeletal defects noted, no edema  PSYCH: mentation appears normal, affect normal/bright    No results found for this or any previous visit (from the past 24 hours).    A1c and lipids in process.    Signed Electronically by: Ana Stark MD    "

## 2025-06-30 ENCOUNTER — OFFICE VISIT (OUTPATIENT)
Dept: FAMILY MEDICINE | Facility: OTHER | Age: 60
End: 2025-06-30
Attending: FAMILY MEDICINE
Payer: COMMERCIAL

## 2025-06-30 ENCOUNTER — RESULTS FOLLOW-UP (OUTPATIENT)
Dept: FAMILY MEDICINE | Facility: OTHER | Age: 60
End: 2025-06-30

## 2025-06-30 VITALS
DIASTOLIC BLOOD PRESSURE: 84 MMHG | HEART RATE: 63 BPM | OXYGEN SATURATION: 94 % | WEIGHT: 189.9 LBS | SYSTOLIC BLOOD PRESSURE: 166 MMHG | TEMPERATURE: 98.7 F | BODY MASS INDEX: 25.05 KG/M2

## 2025-06-30 DIAGNOSIS — Z78.9 STATIN INTOLERANCE: ICD-10-CM

## 2025-06-30 DIAGNOSIS — E78.5 HYPERLIPIDEMIA, UNSPECIFIED HYPERLIPIDEMIA TYPE: ICD-10-CM

## 2025-06-30 DIAGNOSIS — E11.9 TYPE 2 DIABETES MELLITUS WITHOUT COMPLICATION, WITHOUT LONG-TERM CURRENT USE OF INSULIN (H): Primary | ICD-10-CM

## 2025-06-30 DIAGNOSIS — J44.9 CHRONIC OBSTRUCTIVE PULMONARY DISEASE, UNSPECIFIED COPD TYPE (H): ICD-10-CM

## 2025-06-30 DIAGNOSIS — I10 ESSENTIAL HYPERTENSION: ICD-10-CM

## 2025-06-30 DIAGNOSIS — J44.1 CHRONIC OBSTRUCTIVE PULMONARY DISEASE WITH ACUTE EXACERBATION (H): ICD-10-CM

## 2025-06-30 LAB
CHOLEST SERPL-MCNC: 273 MG/DL
EST. AVERAGE GLUCOSE BLD GHB EST-MCNC: 137 MG/DL
FASTING STATUS PATIENT QL REPORTED: NO
HBA1C MFR BLD: 6.4 %
HDLC SERPL-MCNC: 60 MG/DL
LDLC SERPL CALC-MCNC: 194 MG/DL
NONHDLC SERPL-MCNC: 213 MG/DL
TRIGL SERPL-MCNC: 93 MG/DL

## 2025-06-30 RX ORDER — IPRATROPIUM BROMIDE AND ALBUTEROL SULFATE 2.5; .5 MG/3ML; MG/3ML
3 SOLUTION RESPIRATORY (INHALATION) EVERY 4 HOURS PRN
Qty: 90 ML | Refills: 1 | Status: SHIPPED | OUTPATIENT
Start: 2025-06-30

## 2025-06-30 RX ORDER — ALBUTEROL SULFATE 0.83 MG/ML
2.5 SOLUTION RESPIRATORY (INHALATION) EVERY 4 HOURS PRN
Qty: 75 ML | Refills: 1 | Status: SHIPPED | OUTPATIENT
Start: 2025-06-30

## 2025-06-30 RX ORDER — CARVEDILOL 3.12 MG/1
3.12 TABLET ORAL 2 TIMES DAILY WITH MEALS
Qty: 60 TABLET | Refills: 2 | Status: SHIPPED | OUTPATIENT
Start: 2025-06-30

## 2025-06-30 ASSESSMENT — PAIN SCALES - GENERAL: PAINLEVEL_OUTOF10: MODERATE PAIN (4)

## 2025-07-01 ENCOUNTER — PATIENT OUTREACH (OUTPATIENT)
Dept: CARE COORDINATION | Facility: OTHER | Age: 60
End: 2025-07-01

## 2025-07-02 ENCOUNTER — TELEPHONE (OUTPATIENT)
Dept: FAMILY MEDICINE | Facility: OTHER | Age: 60
End: 2025-07-02

## 2025-07-02 NOTE — TELEPHONE ENCOUNTER
called and introduced self to patient. This writer discussed contact information for disability office located in [place] and encouraged patient to schedule an appointment. This writer also encouraged patient to come in for an appointment with this writer if he would like any assistance navigating paperwork or online resources/applications.  also discussed other resources available such as the Senior Linkage Line, [facility]  , and the [employer] organization.  encouraged patient to call or meet with this writer if he has any additional questions or would like assistance in person.

## 2025-07-09 ENCOUNTER — TELEPHONE (OUTPATIENT)
Dept: FAMILY MEDICINE | Facility: OTHER | Age: 60
End: 2025-07-09

## 2025-07-09 NOTE — TELEPHONE ENCOUNTER
10:19 AM    Reason for Call: Phone Call    Description: 1.) Patient brought in a disability form on 6/30/25 and he was told this form wasn't turned in and he won't get paid and needs this sent in.     2.) Needs a letter to released him back to work for 7/24/25 requesting to  the letter at registration and he can be called back at [phone]    Was an appointment offered for this call? Yes  If yes : Appointment type              Date    Preferred method for responding to this message: Telephone Call  What is your phone number ? [phone]    If we cannot reach you directly, may we leave a detailed response at the number you provided? Yes    Can this message wait until your PCP/provider returns, if available today? Not applicable

## 2025-07-09 NOTE — LETTER
July 11, 2025      Ry Man  [address] [place] [address]        To Whom It May Concern:    Ry Man was seen in our clinic. He may return to work on 7/24/2025 without restrictions.      Sincerely,        Ana Stark MD    Electronically signed

## 2025-07-11 NOTE — TELEPHONE ENCOUNTER
Located previous paperwork, will refax it. Per paperwork you are having in return to work on July 24th 2025, I did go ahead a pend letter for you to review. Paperwork was placed on your desk